# Patient Record
Sex: FEMALE | Race: WHITE | NOT HISPANIC OR LATINO | Employment: OTHER | URBAN - METROPOLITAN AREA
[De-identification: names, ages, dates, MRNs, and addresses within clinical notes are randomized per-mention and may not be internally consistent; named-entity substitution may affect disease eponyms.]

---

## 2017-06-03 ENCOUNTER — RESOLUTE PROFESSIONAL BILLING HOSPITAL PROF FEE (OUTPATIENT)
Dept: HOSPITALIST | Facility: MEDICAL CENTER | Age: 62
End: 2017-06-03
Payer: COMMERCIAL

## 2017-06-03 ENCOUNTER — APPOINTMENT (OUTPATIENT)
Dept: RADIOLOGY | Facility: MEDICAL CENTER | Age: 62
DRG: 439 | End: 2017-06-03
Attending: EMERGENCY MEDICINE
Payer: COMMERCIAL

## 2017-06-03 ENCOUNTER — APPOINTMENT (OUTPATIENT)
Dept: RADIOLOGY | Facility: MEDICAL CENTER | Age: 62
DRG: 439 | End: 2017-06-03
Attending: INTERNAL MEDICINE
Payer: COMMERCIAL

## 2017-06-03 ENCOUNTER — HOSPITAL ENCOUNTER (INPATIENT)
Facility: MEDICAL CENTER | Age: 62
LOS: 6 days | DRG: 439 | End: 2017-06-09
Attending: EMERGENCY MEDICINE | Admitting: INTERNAL MEDICINE
Payer: COMMERCIAL

## 2017-06-03 DIAGNOSIS — R10.12 LEFT UPPER QUADRANT PAIN: ICD-10-CM

## 2017-06-03 DIAGNOSIS — R11.2 NON-INTRACTABLE VOMITING WITH NAUSEA, UNSPECIFIED VOMITING TYPE: ICD-10-CM

## 2017-06-03 DIAGNOSIS — E87.6 HYPOKALEMIA: ICD-10-CM

## 2017-06-03 DIAGNOSIS — E86.0 DEHYDRATION: ICD-10-CM

## 2017-06-03 LAB
ALBUMIN SERPL BCP-MCNC: 2.5 G/DL (ref 3.2–4.9)
ALBUMIN/GLOB SERPL: 1.5 G/DL
ALP SERPL-CCNC: 40 U/L (ref 30–99)
ALT SERPL-CCNC: 9 U/L (ref 2–50)
ANION GAP SERPL CALC-SCNC: 8 MMOL/L (ref 0–11.9)
ANION GAP SERPL CALC-SCNC: 9 MMOL/L (ref 0–11.9)
APPEARANCE UR: CLEAR
AST SERPL-CCNC: 10 U/L (ref 12–45)
BACTERIA #/AREA URNS HPF: ABNORMAL /HPF
BASOPHILS # BLD AUTO: 0.1 % (ref 0–1.8)
BASOPHILS # BLD: 0.01 K/UL (ref 0–0.12)
BILIRUB SERPL-MCNC: 0.3 MG/DL (ref 0.1–1.5)
BILIRUB UR QL STRIP.AUTO: NEGATIVE
BUN SERPL-MCNC: 14 MG/DL (ref 8–22)
BUN SERPL-MCNC: 15 MG/DL (ref 8–22)
CALCIUM SERPL-MCNC: 5.5 MG/DL (ref 8.4–10.2)
CALCIUM SERPL-MCNC: 7.5 MG/DL (ref 8.4–10.2)
CHLORIDE SERPL-SCNC: 110 MMOL/L (ref 96–112)
CHLORIDE SERPL-SCNC: 119 MMOL/L (ref 96–112)
CO2 SERPL-SCNC: 12 MMOL/L (ref 20–33)
CO2 SERPL-SCNC: 20 MMOL/L (ref 20–33)
COLOR UR: YELLOW
CREAT SERPL-MCNC: 0.39 MG/DL (ref 0.5–1.4)
CREAT SERPL-MCNC: 0.71 MG/DL (ref 0.5–1.4)
EOSINOPHIL # BLD AUTO: 0 K/UL (ref 0–0.51)
EOSINOPHIL NFR BLD: 0 % (ref 0–6.9)
EPI CELLS #/AREA URNS HPF: ABNORMAL /HPF
ERYTHROCYTE [DISTWIDTH] IN BLOOD BY AUTOMATED COUNT: 42.5 FL (ref 35.9–50)
GFR SERPL CREATININE-BSD FRML MDRD: >60 ML/MIN/1.73 M 2
GFR SERPL CREATININE-BSD FRML MDRD: >60 ML/MIN/1.73 M 2
GLOBULIN SER CALC-MCNC: 1.7 G/DL (ref 1.9–3.5)
GLUCOSE SERPL-MCNC: 83 MG/DL (ref 65–99)
GLUCOSE SERPL-MCNC: 99 MG/DL (ref 65–99)
GLUCOSE UR STRIP.AUTO-MCNC: NEGATIVE MG/DL
HCT VFR BLD AUTO: 31.7 % (ref 37–47)
HGB BLD-MCNC: 11.1 G/DL (ref 12–16)
IMM GRANULOCYTES # BLD AUTO: 0.04 K/UL (ref 0–0.11)
IMM GRANULOCYTES NFR BLD AUTO: 0.4 % (ref 0–0.9)
KETONES UR STRIP.AUTO-MCNC: ABNORMAL MG/DL
LACTATE BLD-SCNC: 1.01 MMOL/L (ref 0.5–2)
LACTATE BLD-SCNC: 1.25 MMOL/L (ref 0.5–2)
LEUKOCYTE ESTERASE UR QL STRIP.AUTO: ABNORMAL
LIPASE SERPL-CCNC: 13 U/L (ref 7–58)
LYMPHOCYTES # BLD AUTO: 0.86 K/UL (ref 1–4.8)
LYMPHOCYTES NFR BLD: 9.3 % (ref 22–41)
MCH RBC QN AUTO: 29.9 PG (ref 27–33)
MCHC RBC AUTO-ENTMCNC: 35 G/DL (ref 33.6–35)
MCV RBC AUTO: 85.4 FL (ref 81.4–97.8)
MICRO URNS: ABNORMAL
MONOCYTES # BLD AUTO: 0.72 K/UL (ref 0–0.85)
MONOCYTES NFR BLD AUTO: 7.8 % (ref 0–13.4)
MUCOUS THREADS #/AREA URNS HPF: ABNORMAL /HPF
NEUTROPHILS # BLD AUTO: 7.65 K/UL (ref 2–7.15)
NEUTROPHILS NFR BLD: 82.4 % (ref 44–72)
NITRITE UR QL STRIP.AUTO: NEGATIVE
NRBC # BLD AUTO: 0 K/UL
NRBC BLD AUTO-RTO: 0 /100 WBC
PH UR STRIP.AUTO: 6 [PH]
PLATELET # BLD AUTO: 248 K/UL (ref 164–446)
PMV BLD AUTO: 8.9 FL (ref 9–12.9)
POTASSIUM SERPL-SCNC: 2 MMOL/L (ref 3.6–5.5)
POTASSIUM SERPL-SCNC: 4.6 MMOL/L (ref 3.6–5.5)
PROT SERPL-MCNC: 4.2 G/DL (ref 6–8.2)
PROT UR QL STRIP: 100 MG/DL
RBC # BLD AUTO: 3.71 M/UL (ref 4.2–5.4)
RBC # URNS HPF: ABNORMAL /HPF
RBC UR QL AUTO: ABNORMAL
SODIUM SERPL-SCNC: 138 MMOL/L (ref 135–145)
SODIUM SERPL-SCNC: 140 MMOL/L (ref 135–145)
SP GR UR STRIP.AUTO: 1.02
SPECIMEN DRAWN FROM PATIENT: NORMAL
SPECIMEN DRAWN FROM PATIENT: NORMAL
TSH SERPL DL<=0.005 MIU/L-ACNC: 0.3 UIU/ML (ref 0.35–5.5)
UNIDENT CRYS URNS QL MICRO: ABNORMAL /HPF
WBC # BLD AUTO: 9.3 K/UL (ref 4.8–10.8)
WBC #/AREA URNS HPF: ABNORMAL /HPF

## 2017-06-03 PROCEDURE — 700111 HCHG RX REV CODE 636 W/ 250 OVERRIDE (IP): Performed by: EMERGENCY MEDICINE

## 2017-06-03 PROCEDURE — 96361 HYDRATE IV INFUSION ADD-ON: CPT

## 2017-06-03 PROCEDURE — 700105 HCHG RX REV CODE 258: Performed by: EMERGENCY MEDICINE

## 2017-06-03 PROCEDURE — 84443 ASSAY THYROID STIM HORMONE: CPT

## 2017-06-03 PROCEDURE — 80048 BASIC METABOLIC PNL TOTAL CA: CPT

## 2017-06-03 PROCEDURE — 770020 HCHG ROOM/CARE - TELE (206)

## 2017-06-03 PROCEDURE — 83690 ASSAY OF LIPASE: CPT

## 2017-06-03 PROCEDURE — 99285 EMERGENCY DEPT VISIT HI MDM: CPT

## 2017-06-03 PROCEDURE — 700111 HCHG RX REV CODE 636 W/ 250 OVERRIDE (IP)

## 2017-06-03 PROCEDURE — 85025 COMPLETE CBC W/AUTO DIFF WBC: CPT

## 2017-06-03 PROCEDURE — 71010 DX-CHEST-PORTABLE (1 VIEW): CPT

## 2017-06-03 PROCEDURE — 700111 HCHG RX REV CODE 636 W/ 250 OVERRIDE (IP): Performed by: INTERNAL MEDICINE

## 2017-06-03 PROCEDURE — 96365 THER/PROPH/DIAG IV INF INIT: CPT

## 2017-06-03 PROCEDURE — 99223 1ST HOSP IP/OBS HIGH 75: CPT | Performed by: INTERNAL MEDICINE

## 2017-06-03 PROCEDURE — 36415 COLL VENOUS BLD VENIPUNCTURE: CPT

## 2017-06-03 PROCEDURE — 96375 TX/PRO/DX INJ NEW DRUG ADDON: CPT

## 2017-06-03 PROCEDURE — 87086 URINE CULTURE/COLONY COUNT: CPT

## 2017-06-03 PROCEDURE — 94760 N-INVAS EAR/PLS OXIMETRY 1: CPT

## 2017-06-03 PROCEDURE — 81001 URINALYSIS AUTO W/SCOPE: CPT

## 2017-06-03 PROCEDURE — 96376 TX/PRO/DX INJ SAME DRUG ADON: CPT

## 2017-06-03 PROCEDURE — 83605 ASSAY OF LACTIC ACID: CPT | Mod: 91

## 2017-06-03 PROCEDURE — 80053 COMPREHEN METABOLIC PANEL: CPT

## 2017-06-03 PROCEDURE — 87040 BLOOD CULTURE FOR BACTERIA: CPT | Mod: 91

## 2017-06-03 RX ORDER — PROMETHAZINE HYDROCHLORIDE 25 MG/1
12.5-25 TABLET ORAL EVERY 4 HOURS PRN
Status: DISCONTINUED | OUTPATIENT
Start: 2017-06-03 | End: 2017-06-09 | Stop reason: HOSPADM

## 2017-06-03 RX ORDER — SODIUM CHLORIDE 9 MG/ML
1000 INJECTION, SOLUTION INTRAVENOUS ONCE
Status: COMPLETED | OUTPATIENT
Start: 2017-06-03 | End: 2017-06-03

## 2017-06-03 RX ORDER — ASPIRIN 81 MG/1
81 TABLET, CHEWABLE ORAL DAILY
Status: DISCONTINUED | OUTPATIENT
Start: 2017-06-03 | End: 2017-06-09 | Stop reason: HOSPADM

## 2017-06-03 RX ORDER — OXYCODONE HYDROCHLORIDE 15 MG/1
15 TABLET ORAL EVERY 4 HOURS PRN
Status: ON HOLD | COMMUNITY
End: 2017-06-09

## 2017-06-03 RX ORDER — ENALAPRILAT 1.25 MG/ML
1.25 INJECTION INTRAVENOUS EVERY 6 HOURS PRN
Status: DISCONTINUED | OUTPATIENT
Start: 2017-06-03 | End: 2017-06-09 | Stop reason: HOSPADM

## 2017-06-03 RX ORDER — AMOXICILLIN 250 MG
2 CAPSULE ORAL 2 TIMES DAILY
Status: DISCONTINUED | OUTPATIENT
Start: 2017-06-03 | End: 2017-06-09 | Stop reason: HOSPADM

## 2017-06-03 RX ORDER — OXYCODONE HYDROCHLORIDE 5 MG/1
15 TABLET ORAL EVERY 4 HOURS PRN
Status: DISCONTINUED | OUTPATIENT
Start: 2017-06-03 | End: 2017-06-09 | Stop reason: HOSPADM

## 2017-06-03 RX ORDER — POTASSIUM CHLORIDE 7.45 MG/ML
INJECTION INTRAVENOUS
Status: DISPENSED
Start: 2017-06-03 | End: 2017-06-04

## 2017-06-03 RX ORDER — MORPHINE SULFATE 4 MG/ML
4 INJECTION, SOLUTION INTRAMUSCULAR; INTRAVENOUS ONCE
Status: DISCONTINUED | OUTPATIENT
Start: 2017-06-03 | End: 2017-06-03

## 2017-06-03 RX ORDER — ONDANSETRON 2 MG/ML
4 INJECTION INTRAMUSCULAR; INTRAVENOUS ONCE
Status: COMPLETED | OUTPATIENT
Start: 2017-06-03 | End: 2017-06-03

## 2017-06-03 RX ORDER — POLYETHYLENE GLYCOL 3350 17 G/17G
1 POWDER, FOR SOLUTION ORAL
Status: DISCONTINUED | OUTPATIENT
Start: 2017-06-03 | End: 2017-06-09 | Stop reason: HOSPADM

## 2017-06-03 RX ORDER — POTASSIUM CHLORIDE 7.45 MG/ML
10 INJECTION INTRAVENOUS
Status: COMPLETED | OUTPATIENT
Start: 2017-06-03 | End: 2017-06-03

## 2017-06-03 RX ORDER — BISACODYL 10 MG
10 SUPPOSITORY, RECTAL RECTAL
Status: DISCONTINUED | OUTPATIENT
Start: 2017-06-03 | End: 2017-06-09 | Stop reason: HOSPADM

## 2017-06-03 RX ORDER — POTASSIUM CHLORIDE 20 MEQ/1
40 TABLET, EXTENDED RELEASE ORAL 3 TIMES DAILY
Status: DISPENSED | OUTPATIENT
Start: 2017-06-03 | End: 2017-06-05

## 2017-06-03 RX ORDER — ONDANSETRON 4 MG/1
4 TABLET, ORALLY DISINTEGRATING ORAL EVERY 4 HOURS PRN
Status: DISCONTINUED | OUTPATIENT
Start: 2017-06-03 | End: 2017-06-09 | Stop reason: HOSPADM

## 2017-06-03 RX ORDER — METOCLOPRAMIDE HYDROCHLORIDE 5 MG/ML
10 INJECTION INTRAMUSCULAR; INTRAVENOUS EVERY 6 HOURS
Status: DISCONTINUED | OUTPATIENT
Start: 2017-06-03 | End: 2017-06-06

## 2017-06-03 RX ORDER — SODIUM CHLORIDE 9 MG/ML
INJECTION, SOLUTION INTRAVENOUS CONTINUOUS
Status: DISCONTINUED | OUTPATIENT
Start: 2017-06-03 | End: 2017-06-04

## 2017-06-03 RX ORDER — MORPHINE SULFATE 4 MG/ML
1-4 INJECTION, SOLUTION INTRAMUSCULAR; INTRAVENOUS EVERY 4 HOURS PRN
Status: DISCONTINUED | OUTPATIENT
Start: 2017-06-03 | End: 2017-06-05

## 2017-06-03 RX ORDER — ONDANSETRON 2 MG/ML
4 INJECTION INTRAMUSCULAR; INTRAVENOUS EVERY 4 HOURS PRN
Status: DISCONTINUED | OUTPATIENT
Start: 2017-06-03 | End: 2017-06-09 | Stop reason: HOSPADM

## 2017-06-03 RX ORDER — PROMETHAZINE HYDROCHLORIDE 25 MG/1
12.5-25 SUPPOSITORY RECTAL EVERY 4 HOURS PRN
Status: DISCONTINUED | OUTPATIENT
Start: 2017-06-03 | End: 2017-06-09 | Stop reason: HOSPADM

## 2017-06-03 RX ADMIN — MORPHINE SULFATE 2 MG: 4 INJECTION INTRAVENOUS at 20:01

## 2017-06-03 RX ADMIN — ENOXAPARIN SODIUM 40 MG: 100 INJECTION SUBCUTANEOUS at 17:58

## 2017-06-03 RX ADMIN — POTASSIUM CHLORIDE 10 MEQ: 10 INJECTION, SOLUTION INTRAVENOUS at 15:18

## 2017-06-03 RX ADMIN — SODIUM CHLORIDE: 9 INJECTION, SOLUTION INTRAVENOUS at 18:42

## 2017-06-03 RX ADMIN — SODIUM CHLORIDE 1000 ML: 9 INJECTION, SOLUTION INTRAVENOUS at 13:12

## 2017-06-03 RX ADMIN — SODIUM CHLORIDE: 9 INJECTION, SOLUTION INTRAVENOUS at 15:22

## 2017-06-03 RX ADMIN — METOCLOPRAMIDE 10 MG: 5 INJECTION, SOLUTION INTRAMUSCULAR; INTRAVENOUS at 17:57

## 2017-06-03 RX ADMIN — ONDANSETRON 4 MG: 2 INJECTION INTRAMUSCULAR; INTRAVENOUS at 13:11

## 2017-06-03 RX ADMIN — MORPHINE SULFATE 2 MG: 4 INJECTION INTRAVENOUS at 18:41

## 2017-06-03 RX ADMIN — SODIUM CHLORIDE: 9 INJECTION, SOLUTION INTRAVENOUS at 22:44

## 2017-06-03 RX ADMIN — HYDROMORPHONE HYDROCHLORIDE 1 MG: 1 INJECTION, SOLUTION INTRAMUSCULAR; INTRAVENOUS; SUBCUTANEOUS at 13:11

## 2017-06-03 RX ADMIN — POTASSIUM CHLORIDE 10 MEQ: 10 INJECTION, SOLUTION INTRAVENOUS at 17:40

## 2017-06-03 RX ADMIN — HYDROMORPHONE HYDROCHLORIDE 0.5 MG: 1 INJECTION, SOLUTION INTRAMUSCULAR; INTRAVENOUS; SUBCUTANEOUS at 15:37

## 2017-06-03 RX ADMIN — ONDANSETRON 4 MG: 2 INJECTION INTRAMUSCULAR; INTRAVENOUS at 15:38

## 2017-06-03 RX ADMIN — POTASSIUM CHLORIDE 10 MEQ: 10 INJECTION, SOLUTION INTRAVENOUS at 16:23

## 2017-06-03 ASSESSMENT — PAIN SCALES - GENERAL
PAINLEVEL_OUTOF10: 5
PAINLEVEL_OUTOF10: 2
PAINLEVEL_OUTOF10: 4
PAINLEVEL_OUTOF10: 7

## 2017-06-03 ASSESSMENT — LIFESTYLE VARIABLES
DO YOU DRINK ALCOHOL: NO
EVER_SMOKED: YES

## 2017-06-03 ASSESSMENT — PATIENT HEALTH QUESTIONNAIRE - PHQ9
1. LITTLE INTEREST OR PLEASURE IN DOING THINGS: NOT AT ALL
SUM OF ALL RESPONSES TO PHQ QUESTIONS 1-9: 0
SUM OF ALL RESPONSES TO PHQ9 QUESTIONS 1 AND 2: 0
2. FEELING DOWN, DEPRESSED, IRRITABLE, OR HOPELESS: NOT AT ALL

## 2017-06-03 NOTE — ED NOTES
Sabina from Lab called with critical result of Potassium of 2.0 and Calcium of 5.5 at 1454. Critical lab result read back to Sabina.   Dr. Molina notified of critical lab result at 1455.  Critical lab result read back by Dr. Molina.

## 2017-06-03 NOTE — ED NOTES
Pt amb to triage c/o clear productive cough with back pain for 2 weeks. Pt is a 1/2 pack a day smoker. Pt also c/o LUQ pain a week with vomiting. Hx of pancreatitis.

## 2017-06-03 NOTE — IP AVS SNAPSHOT
6/9/2017    Antonieta Addison  [address]  [place] [address]    Dear Antonieta:    Counts include 234 beds at the [facility] wants to ensure your discharge home is safe and you or your loved ones have had all of your questions answered regarding your care after you leave the hospital.    Below is a list of resources and contact information should you have any questions regarding your hospital stay, follow-up instructions, or active medical symptoms.    Questions or Concerns Regarding… Contact   Medical Questions Related to Your Discharge  (7 days a week, 8am-5pm) Contact a Nurse Care Coordinator   [phone]   Medical Questions Not Related to Your Discharge  (24 hours a day / 7 days a week)  Contact the Nurse Health Line   [phone]    Medications or Discharge Instructions Refer to your discharge packet   or contact your [facility] Primary Care Provider   [phone]   Follow-up Appointment(s) Schedule your appointment via [employer]   or contact Scheduling [phone]   Billing Review your statement via [employer]  or contact Billing [phone]   Medical Records Review your records via [employer]   or contact Medical Records [phone]     You may receive a telephone call within two days of discharge. This call is to make certain you understand your discharge instructions and have the opportunity to have any questions answered. You can also easily access your medical information, test results and upcoming appointments via the [employer] free online health management tool. You can learn more and sign up at [employer]. For assistance setting up your [employer] account, please call [phone].    Once again, we want to ensure your discharge home is safe and that you have a clear understanding of any next steps in your care. If you have any questions or concerns, please do not hesitate to contact us, we are here for you. Thank you for choosing [facility] for your healthcare needs.    Sincerely,    Your [facility]  Team

## 2017-06-03 NOTE — IP AVS SNAPSHOT
" Home Care Instructions                                                                                                                  Name:Antonieta Addison  Medical Record Number:[id number]  CSN: [id number]    YOB: 1955   Age: 62 y.o.  Sex: female  HT:1.499 m (4' 11\") WT: 41.7 kg (91 lb 14.9 oz)          Admit Date: 6/3/2017     Discharge Date:   Today's Date: 6/9/2017  Attending Doctor:  Mikhail Lowry M.D.                  Allergies:  Iodine and Tape            Discharge Instructions       Discharge Instructions    Discharged to home by car with relative. Discharged via wheelchair, hospital escort: Yes.  Special equipment needed: Not Applicable    Be sure to schedule a follow-up appointment with your primary care doctor or any specialists as instructed.     Discharge Plan:   Influenza Vaccine Indication: Patient Refuses    I understand that a diet low in cholesterol, fat, and sodium is recommended for good health. Unless I have been given specific instructions below for another diet, I accept this instruction as my diet prescription.   Other diet: full liquids until follow up with GI    Special Instructions: None    · Is patient discharged on Warfarin / Coumadin?   No     · Is patient Post Blood Transfusion?  No  Nausea and Vomiting  Nausea is a sick feeling that often comes before throwing up (vomiting). Vomiting is a reflex where stomach contents come out of your mouth. Vomiting can cause severe loss of body fluids (dehydration). Children and elderly adults can become dehydrated quickly, especially if they also have diarrhea. Nausea and vomiting are symptoms of a condition or disease. It is important to find the cause of your symptoms.  CAUSES   · Direct irritation of the stomach lining. This irritation can result from increased acid production (gastroesophageal reflux disease), infection, food poisoning, taking certain medicines (such as nonsteroidal anti-inflammatory drugs), alcohol use, " or tobacco use.  · Signals from the brain. These signals could be caused by a headache, heat exposure, an inner ear disturbance, increased pressure in the brain from injury, infection, a tumor, or a concussion, pain, emotional stimulus, or metabolic problems.  · An obstruction in the gastrointestinal tract (bowel obstruction).  · Illnesses such as diabetes, hepatitis, gallbladder problems, appendicitis, kidney problems, cancer, sepsis, atypical symptoms of a heart attack, or eating disorders.  · Medical treatments such as chemotherapy and radiation.  · Receiving medicine that makes you sleep (general anesthetic) during surgery.  DIAGNOSIS  Your caregiver may ask for tests to be done if the problems do not improve after a few days. Tests may also be done if symptoms are severe or if the reason for the nausea and vomiting is not clear. Tests may include:  · Urine tests.  · Blood tests.  · Stool tests.  · Cultures (to look for evidence of infection).  · X-rays or other imaging studies.  Test results can help your caregiver make decisions about treatment or the need for additional tests.  TREATMENT  You need to stay well hydrated. Drink frequently but in small amounts. You may wish to drink water, sports drinks, clear broth, or eat frozen ice pops or gelatin dessert to help stay hydrated. When you eat, eating slowly may help prevent nausea. There are also some antinausea medicines that may help prevent nausea.  HOME CARE INSTRUCTIONS   · Take all medicine as directed by your caregiver.  · If you do not have an appetite, do not force yourself to eat. However, you must continue to drink fluids.  · If you have an appetite, eat a normal diet unless your caregiver tells you differently.  1. Eat a variety of complex carbohydrates (rice, wheat, potatoes, bread), lean meats, yogurt, fruits, and vegetables.  2. Avoid high-fat foods because they are more difficult to digest.  · Drink enough water and fluids to keep your urine  clear or pale yellow.  · If you are dehydrated, ask your caregiver for specific rehydration instructions. Signs of dehydration may include:  1. Severe thirst.  2. Dry lips and mouth.  3. Dizziness.  4. Dark urine.  5. Decreasing urine frequency and amount.  6. Confusion.  7. Rapid breathing or pulse.  SEEK IMMEDIATE MEDICAL CARE IF:   · You have blood or brown flecks (like coffee grounds) in your vomit.  · You have black or bloody stools.  · You have a severe headache or stiff neck.  · You are confused.  · You have severe abdominal pain.  · You have chest pain or trouble breathing.  · You do not urinate at least once every 8 hours.  · You develop cold or clammy skin.  · You continue to vomit for longer than 24 to 48 hours.  · You have a fever.  MAKE SURE YOU:   · Understand these instructions.  · Will watch your condition.  · Will get help right away if you are not doing well or get worse.     This information is not intended to replace advice given to you by your health care provider. Make sure you discuss any questions you have with your health care provider.     Document Released: 12/18/2006 Document Revised: 03/11/2013 Document Reviewed: 05/16/2012  [employer] Interactive Patient Education ©2016 [employer]  Hypokalemia  Hypokalemia means a low potassium level in the blood. Symptoms may include muscle weakness and cramping, fatigue, abdominal pain, vomiting, constipation, or irregularities of the heartbeat. Sometimes hypokalemia is discovered by your caregiver if you are taking certain medicines for high blood pressure or kidney disease.   Potassium is an electrolyte that helps regulate the amount of fluid in the body. It also stimulates muscle contraction and maintains a stable acid-base balance. If potassium levels go too low or too high, your health may be in danger. You are at risk for developing shock, heart, and lung problems. Hypokalemia can occur if you have excessive diarrhea, vomiting, or sweating.  Potassium can be lost through your kidneys in the urine. Certain common medicines can also cause potassium loss, especially water pills (diuretics). The same is possible with cortisone medications or certain types of antibiotics. Low potassium can be dangerous if you are taking certain heart medicines. In diabetes, your potassium may fall after you take insulin, especially if your diabetes had been out of control for a while. In rare cases, potassium may be low because you are not getting enough in your diet.   In adults, a potassium level below 3.5 mEq/L is usually considered low.  Hypokalemia can be treated with potassium supplements taken by mouth and a diet that is high in potassium. Foods with high potassium content are:  · Peas, lentils, lima beans, nuts, and dried fruit.   · Whole grain and bran cereals and breads.   · Fresh fruit and vegetables. Examples include:   · Bananas.   · Cantaloupe.   · Grapefruit.   · Oranges.   · Tomatoes.   · Honeydew melons.   · Potatoes.   · Peaches.   · Orange and tomato juices.   · Meats.   See your caregiver as instructed for a follow-up blood test to be sure your potassium is back to normal.  SEEK MEDICAL CARE IF:   · You have nausea, vomiting, constipation, or abdominal pain.   · You have palpitations or irregular heartbeats, chest pain or shortness of breath.   · You have muscle cramps or weakness or fatigue.   · You have lethargy.   SEEK IMMEDIATE MEDICAL CARE IF:   · You have paralysis.   · You have confusion or other mental status changes.   Document Released: 12/18/2006 Document Revised: 03/11/2013 Document Reviewed: 04/13/2011  [employer] Patient Information ©2013 [employer].  Acute Pancreatitis  Acute pancreatitis is a disease in which the pancreas becomes suddenly irritated (inflamed). The pancreas is a large gland behind your stomach. The pancreas makes enzymes that help digest food. The pancreas also makes 2 hormones that help control your blood sugar. Acute  pancreatitis happens when the enzymes attack and damage the pancreas. Most attacks last a couple of days and can cause serious problems.  HOME CARE  · Follow your doctor's diet instructions. You may need to avoid alcohol and limit fat in your diet.  · Eat small meals often.  · Drink enough fluids to keep your pee (urine) clear or pale yellow.  · Only take medicines as told by your doctor.  · Avoid drinking alcohol if it caused your disease.  · Do not smoke.  · Get plenty of rest.  · Check your blood sugar at home as told by your doctor.  · Keep all doctor visits as told.  GET HELP IF:  · You do not get better as quickly as expected.  · You have new or worsening symptoms.  · You have lasting pain, weakness, or feel sick to your stomach (nauseous).  · You get better and then have another pain attack.  GET HELP RIGHT AWAY IF:   · You are unable to eat or keep fluids down.  · Your pain becomes severe.  · You have a fever or lasting symptoms for more than 2 to 3 days.  · You have a fever and your symptoms suddenly get worse.  · Your skin or the white part of your eyes turn yellow (jaundice).  · You throw up (vomit).  · You feel dizzy, or you pass out (faint).  · Your blood sugar is high (over 300 mg/dL).  MAKE SURE YOU:   · Understand these instructions.  · Will watch your condition.  · Will get help right away if you are not doing well or get worse.     This information is not intended to replace advice given to you by your health care provider. Make sure you discuss any questions you have with your health care provider.     Document Released: 06/05/2009 Document Revised: 01/08/2016 Document Reviewed: 03/28/2013  [employer] Interactive Patient Education ©2016 [employer]      Depression / Suicide Risk    As you are discharged from this [facility] facility, it is important to learn how to keep safe from harming yourself.    Recognize the warning signs:  · Abrupt changes in personality, positive or negative- including  increase in energy   · Giving away possessions  · Change in eating patterns- significant weight changes-  positive or negative  · Change in sleeping patterns- unable to sleep or sleeping all the time   · Unwillingness or inability to communicate  · Depression  · Unusual sadness, discouragement and loneliness  · Talk of wanting to die  · Neglect of personal appearance   · Rebelliousness- reckless behavior  · Withdrawal from people/activities they love  · Confusion- inability to concentrate     If you or a loved one observes any of these behaviors or has concerns about self-harm, here's what you can do:  · Talk about it- your feelings and reasons for harming yourself  · Remove any means that you might use to hurt yourself (examples: pills, rope, extension cords, firearm)  · Get professional help from the community (Mental Health, Substance Abuse, psychological counseling)  · Do not be alone:Call your Safe Contact- someone whom you trust who will be there for you.  · Call your local CRISIS HOTLINE 297-7701 or 273-422-6398  · Call your local Children's Mobile Crisis Response Team [place] (713) 255-6070 or wwwMagix  · Call the toll free National Suicide Prevention Hotlines   · National Suicide Prevention Lifeline 512-523-GKEU (4782)  · National Hope Line Network 800-SUICIDE (207-9962)        Follow-up Information     1. Follow up with Nathan Gloria M.D..    Specialty:  Gastroenterology    Why:  RN called and left message with office nurse to make appt & call back ASAP    Contact information    [address] [place] [address]  [phone]           Discharge Medication Instructions:    Below are the medications your physician expects you to take upon discharge:    Review all your home medications and newly ordered medications with your doctor and/or pharmacist. Follow medication instructions as directed by your doctor and/or pharmacist.    Please keep your medication list with you and share with  your physician.               Medication List      START taking these medications        Instructions    Morning Afternoon Evening Bedtime    omeprazole 20 MG delayed-release capsule   Last time this was given:  20 mg on 6/9/2017  7:42 AM   Commonly known as:  PRILOSEC        Take 1 Cap by mouth every day.   Dose:  20 mg                        ondansetron 4 MG Tbdp   Last time this was given:  4 mg on 6/9/2017 12:46 PM   Commonly known as:  ZOFRAN ODT        Take 1 Tab by mouth every four hours as needed for Nausea/Vomiting (give PO if no IV route available).   Dose:  4 mg                        pancrelipase (Lip-Prot-Amyl) 6000 UNITS Cpep   Last time this was given:  12,000 Units on 6/9/2017 11:50 AM   Commonly known as:  CREON 6000        Take 2 Caps by mouth 3 times a day, with meals.   Dose:  2 Cap                          CHANGE how you take these medications        Instructions    Morning Afternoon Evening Bedtime    * oxycodone 15 MG immediate release tablet   What changed:  when to take this   Last time this was given:  15 mg on 6/9/2017 10:36 AM   Commonly known as:  OXY-IR        Take 1 Tab by mouth every 6 hours as needed for Severe Pain.   Dose:  15 mg                        * oxyCODONE CR 10 MG T12a   What changed:  You were already taking a medication with the same name, and this prescription was added. Make sure you understand how and when to take each.   Last time this was given:  10 mg on 6/9/2017  7:42 AM   Commonly known as:  OXYCONTIN        Take 1 Tab by mouth every 12 hours.   Dose:  10 mg                        * Notice:  This list has 2 medication(s) that are the same as other medications prescribed for you. Read the directions carefully, and ask your doctor or other care provider to review them with you.      CONTINUE taking these medications        Instructions    Morning Afternoon Evening Bedtime    aspirin 81 MG tablet        Take 81 mg by mouth every day.   Dose:  81 mg                          NON SPECIFIED        Take 2 Tabs by mouth every 6 hours as needed. OTC - Cold Medication   Dose:  2 Tab                             Where to Get Your Medications      These medications were sent to [id number] - [place] - [address]  [address] [place] [address]     Phone:  [phone]    - omeprazole 20 MG delayed-release capsule  - ondansetron 4 MG Tbdp  - pancrelipase (Lip-Prot-Amyl) 6000 UNITS Cpep      You can get these medications from any pharmacy     Bring a paper prescription for each of these medications    - oxycodone 15 MG immediate release tablet  - oxyCODONE CR 10 MG T12a            Instructions           Diet / Nutrition:    Follow any diet instructions given to you by your doctor or the dietician, including how much salt (sodium) you are allowed each day.    If you are overweight, talk to your doctor about a weight reduction plan.    Activity:    Remain physically active following your doctor's instructions about exercise and activity.    Rest often.     Any time you become even a little tired or short of breath, SIT DOWN and rest.    Worsening Symptoms:    Report any of the following signs and symptoms to the doctor's office immediately:    *Pain of jaw, arm, or neck  *Chest pain not relieved by medication                               *Dizziness or loss of consciousness  *Difficulty breathing even when at rest   *More tired than usual                                       *Bleeding drainage or swelling of surgical site  *Swelling of feet, ankles, legs or stomach                 *Fever (>100ºF)  *Pink or blood tinged sputum  *Weight gain (3lbs/day or 5lbs /week)           *Shock from internal defibrillator (if applicable)  *Palpitations or irregular heartbeats                *Cool and/or numb extremities    Stroke Awareness    Common Risk Factors for Stroke include:    Age  Atrial Fibrillation  Carotid Artery Stenosis  Diabetes Mellitus  Excessive alcohol  consumption  High blood pressure  Overweight   Physical inactivity  Smoking    Warning signs and symptoms of a stroke include:    *Sudden numbness or weakness of the face, arm or leg (especially on one side of the body).  *Sudden confusion, trouble speaking or understanding.  *Sudden trouble seeing in one or both eyes.  *Sudden trouble walking, dizziness, loss of balance or coordination.Sudden severe headache with no known cause.    It is very important to get treatment quickly when a stroke occurs. If you experience any of the above warning signs, call 911 immediately.                   Disclaimer         Quit Smoking / Tobacco Use:    I understand the use of any tobacco products increases my chance of suffering from future heart disease or stroke and could cause other illnesses which may shorten my life. Quitting the use of tobacco products is the single most important thing I can do to improve my health. For further information on smoking / tobacco cessation call a Toll Free Quit Line at 1-778.552.4297 (*National Cancer [place]) or 1-882.299.4392 (American Lung Association) or you can access the web based program at www.lung"[employer].org.    [place] Tobacco Users Help Line:  (167) 627-5996       Toll Free: 1-402.474.9611  Quit Tobacco Program [facility] (245)928-9776    Crisis Hotline:    [place] Crisis Hotline:  4-559-WLKGZMR or 1-723.588.2419    [place] Crisis Hotline:    1-614.171.6062 or 010-679-4115    Discharge Survey:   Thank you for choosing [facility]. We hope we did everything we could to make your hospital stay a pleasant one. You may be receiving a phone survey and we would appreciate your time and participation in answering the questions. Your input is very valuable to us in our efforts to improve our service to our patients and their families.        My signature on this form indicates that:    1. I have reviewed and understand the above information.  2. My questions regarding  this information have been answered to my satisfaction.  3. I have formulated a plan with my discharge nurse to obtain my prescribed medications for home.                  Disclaimer         __________________________________                     __________       ________                       Patient Signature                                                 Date                    Time

## 2017-06-03 NOTE — ED PROVIDER NOTES
"ED Provider Note    CHIEF COMPLAINT  Chief Complaint   Patient presents with   • LUQ Pain   • N/V   • Cough       HPI  Antonieta Dominique is a 62 y.o. female who presents for left upper quadrant abdominal pain for 7 days which is constant and quite severe. She's had fever to 101 and a 10 pound weight loss. She has nausea and vomiting whenever she eats or drinks. History of pancreatitis and this is similar. The pain does not radiate to the back. History of pancreatic head cyst and denies history of cholecystitis or alcohol use.    REVIEW OF SYSTEMS  Pertinent positives include: Abdominal pain, vomiting, fever. Cough.  Pertinent negatives include: Diarrhea, constipation, peptic ulcer disease, gastritis, dyspepsia, diverticulitis, cholecystitis, alcohol use, diabetes, immunocompromise, dysuria, urgency, frequency, hematuria.  10+ systems reviewed and negative.      PAST MEDICAL HISTORY  Past Medical History   Diagnosis Date   • Panic attack    • Personal history of venous thrombosis and embolism      1970   • Other specified symptom associated with female genital organs      hysterectomy   • Cancer (CMS-HCC)      colon CA 1977   • Psychiatric problem      panic attacks   • Heart murmur      as child   • COPD      asthma   • Arthritis      rheumatoid; hands & R shoulder   • Fall      occasionally due to macular degeneration   • Macular degeneration    • Hiatus hernia syndrome    • Bowel habit changes      constipation   • Dental disorder      upper partial denture   • Pneumonia 1995   • Back pain    • PAIN DISORDER    • Pain      mid back, low back   • Hepatitis A 1980's   • Hypertension      weight loss, taken off medication approx 2014   • Heart burn    • Angina      occasional, had cardiac workup \"ait was fine\"   • Bronchitis 1995   • ASTHMA      \"not an issue since pneumonia in 1995\"   • Snoring      sleep study done approx 1998, no treatment recommended   • Cataract      surgical repair bilateral       SOCIAL " "HISTORY  Social History   Substance Use Topics   • Smoking status: Current Every Day Smoker -- 0.50 packs/day for 32 years     Types: Cigarettes   • Smokeless tobacco: Never Used      Comment: 1 ppd times 30yrs   • Alcohol Use: No     History   Drug Use No       SURGICAL HISTORY  Past Surgical History   Procedure Laterality Date   • Hysterectomy, total abdominal  1975   • Tonsillectomy  1957   • Primary c section  1972, 1973   • Colon resection  1983   • Cataract extraction with iol  2013   • Eye surgery Bilateral 1950's     muscle repair   • Laparotomy  1977     bilateral salpingo-oophorectomy   • Laparoscopy  1980's   • Gastroscopy-endo N/A 4/8/2016     Procedure: GASTROSCOPY-ENDO W/POSS BIOPSY, DILATION, PANCREAS POLYPECTOMY AND CONTROL OF HEMORRHAGE;  Surgeon: Nathan Gloria M.D.;  Location: [facility];  Service:    • Egd w/endoscopic ultrasound N/A 4/8/2016     Procedure: EGD W/ENDOSCOPIC ULTRASOUND;  Surgeon: Nathan Gloria M.D.;  Location: [facility];  Service:    • Egd with asp/bx N/A 4/8/2016     Procedure: EGD WITH ASP/BX - FNA;  Surgeon: Nathan Gloria M.D.;  Location: SURGERY [facility];  Service:        CURRENT MEDICATIONS  See Epic      ALLERGIES  Allergies   Allergen Reactions   • Iodine Hives     IVP dye   • Tape Itching     Peels skin off  Paper tape ok, if it is not on to long.       PHYSICAL EXAM  VITAL SIGNS: /82 mmHg  Pulse 120  Temp(Src) 37.8 °C (100 °F)  Resp 22  Ht 1.499 m (4' 11\")  Wt 38 kg (83 lb 12.4 oz)  BMI 16.91 kg/m2  SpO2 97%  Reviewed and tachycardic, borderline febrile, tachypneic, no hypoxia room air  Constitutional: Well developed, Well nourished, thin, appears mildly ill.  HENT: Normocephalic, atraumatic, bilateral external ears normal, oropharynx moist, No exudates or erythema.   Eyes: PERRLA, conjunctiva pink, no scleral icterus.   Cardiovascular: Regular tachycardic without murmur, rub, gallop.  Respiratory: No rales, rhonchi, " wheeze.  Gastrointestinal: Soft, mild left upper quadrant tenderness without rebound guarding or distention. No masses.  Skin: No erythema, no rash.   Genitourinary:  No costovertebral angle tenderness.   Neurologic: Alert & oriented x 3, cranial nerves 2-12 intact by passive exam.  No focal deficit noted.  Psychiatric: Affect normal, Judgment normal, Mood normal.     DIFFERENTIAL DIAGNOSIS:  Pancreatitis, pyelonephritis, gastritis, peptic ulcer disease, colitis, splenic infarct, pneumonia.    RADIOLOGY/PROCEDURES  DX-CHEST-PORTABLE (1 VIEW)   Final Result      No acute cardiac or pulmonary abnormalities are identified.      Prior CT abdomen and pelvis results reviewed from December demonstrated 14 mm pancreatic head cyst      LABORATORY:  Results for orders placed or performed during the hospital encounter of 06/03/17   Lactic acid (lactate)   Result Value Ref Range    Lactic Acid 1.01 0.50 - 2.00 mmol/L    Specimen Venous    Lactic acid (lactate)   Result Value Ref Range    Lactic Acid 1.25 0.50 - 2.00 mmol/L    Specimen Venous    CBC WITH DIFFERENTIAL   Result Value Ref Range    WBC 9.3 4.8 - 10.8 K/uL    RBC 3.71 (L) 4.20 - 5.40 M/uL    Hemoglobin 11.1 (L) 12.0 - 16.0 g/dL    Hematocrit 31.7 (L) 37.0 - 47.0 %    MCV 85.4 81.4 - 97.8 fL    MCH 29.9 27.0 - 33.0 pg    MCHC 35.0 33.6 - 35.0 g/dL    RDW 42.5 35.9 - 50.0 fL    Platelet Count 248 164 - 446 K/uL    MPV 8.9 (L) 9.0 - 12.9 fL    Neutrophils-Polys 82.40 (H) 44.00 - 72.00 %    Lymphocytes 9.30 (L) 22.00 - 41.00 %    Monocytes 7.80 0.00 - 13.40 %    Eosinophils 0.00 0.00 - 6.90 %    Basophils 0.10 0.00 - 1.80 %    Immature Granulocytes 0.40 0.00 - 0.90 %    Nucleated RBC 0.00 /100 WBC    Neutrophils (Absolute) 7.65 (H) 2.00 - 7.15 K/uL    Lymphs (Absolute) 0.86 (L) 1.00 - 4.80 K/uL    Monos (Absolute) 0.72 0.00 - 0.85 K/uL    Eos (Absolute) 0.00 0.00 - 0.51 K/uL    Baso (Absolute) 0.01 0.00 - 0.12 K/uL    Immature Granulocytes (abs) 0.04 0.00 - 0.11 K/uL     NRBC (Absolute) 0.00 K/uL   COMP METABOLIC PANEL   Result Value Ref Range    Sodium 140 135 - 145 mmol/L    Potassium 2.0 (LL) 3.6 - 5.5 mmol/L    Chloride 119 (H) 96 - 112 mmol/L    Co2 12 (L) 20 - 33 mmol/L    Anion Gap 9.0 0.0 - 11.9    Glucose 83 65 - 99 mg/dL    Bun 14 8 - 22 mg/dL    Creatinine 0.39 (L) 0.50 - 1.40 mg/dL    Calcium 5.5 (LL) 8.4 - 10.2 mg/dL    AST(SGOT) 10 (L) 12 - 45 U/L    ALT(SGPT) 9 2 - 50 U/L    Alkaline Phosphatase 40 30 - 99 U/L    Total Bilirubin 0.3 0.1 - 1.5 mg/dL    Albumin 2.5 (L) 3.2 - 4.9 g/dL    Total Protein 4.2 (L) 6.0 - 8.2 g/dL    Globulin 1.7 (L) 1.9 - 3.5 g/dL    A-G Ratio 1.5 g/dL   URINALYSIS   Result Value Ref Range    Micro Urine Req Microscopic     Color Yellow     Character Clear     Specific Gravity 1.025 <1.035    Ph 6.0 5.0-8.0    Glucose Negative Negative mg/dL    Ketones Trace (A) Negative mg/dL    Protein 100 (A) Negative mg/dL    Bilirubin Negative Negative    Nitrite Negative Negative    Leukocyte Esterase Trace (A) Negative    Occult Blood Trace (A) Negative   LIPASE   Result Value Ref Range    Lipase 13 7 - 58 U/L   URINE MICROSCOPIC (W/UA)   Result Value Ref Range    WBC 2-5 /hpf    RBC 2-5 (A) /hpf    Bacteria Rare (A) None /hpf    Epithelial Cells Rare Few /hpf    Mucous Threads Few /hpf    Urine Crystals Rare Amorphous /hpf       INTERVENTIONS: Indication dehydration and vomiting and pain  Medications   NS infusion ( Intravenous New Bag 6/3/17 1522)   potassium chloride in water (KCL) ivpb 10 mEq (10 mEq Intravenous New Bag 6/3/17 1623)   ondansetron (ZOFRAN) syringe/vial injection 4 mg (4 mg Intravenous Given 6/3/17 1311)   HYDROmorphone (DILAUDID) injection 1 mg (1 mg Intravenous Given 6/3/17 1311)   NS infusion 1,000 mL (0 mL Intravenous Stopped 6/3/17 1501)   HYDROmorphone (DILAUDID) injection 0.5 mg (0.5 mg Intravenous Given 6/3/17 1537)   ondansetron (ZOFRAN) syringe/vial injection 4 mg (4 mg Intravenous Given 6/3/17 1578)   Ace discussed with   Nas hospitalist to admit the patient for IV fluids, bowel rest, IV potassium  Response: Improvement in pain and nausea.    COURSE & MEDICAL DECISION MAKING  Ill-appearing patient presents with a week of abdominal pain and vomiting and has quite severe dehydration with severe hypokalemia. Despite her normal lipase this is likely a flare of chronic pancreatitis. There is no evidence of perforated ulcer and gastritis or peptic ulcer disease are less likely. There is no pneumonia. There is no hypoxia. There is no pyelonephritis..    PLAN:  As above    CONDITION: Fair.    FINAL IMPRESSION  1. Left upper quadrant pain    2. Hypokalemia    3. Non-intractable vomiting with nausea, unspecified vomiting type    4. Dehydration          Electronically signed by: Vamshi Molina, 6/3/2017 1:09 PM

## 2017-06-03 NOTE — IP AVS SNAPSHOT
" <p align=\"LEFT\"><IMG SRC=\"//[email or web address]$/[email or web address]\" alt=\"Image\" WIDTH=\"50%\" HEIGHT=\"200\" BORDER=\"\"></p>                   Name:Antonieta Addison  Medical Record Number:[id number]  CSN: [id number]    YOB: 1955   Age: 62 y.o.  Sex: female  HT:1.499 m (4' 11\") WT: 41.7 kg (91 lb 14.9 oz)          Admit Date: 6/3/2017     Discharge Date:   Today's Date: 6/9/2017  Attending Doctor:  Mikhail Lowry M.D.                  Allergies:  Iodine and Tape          Follow-up Information     1. Follow up with Nathan Gloria M.D..    Specialty:  Gastroenterology    Why:  RN called and left message with office nurse to make appt & call back ASAP    Contact information    [address] [place] [address]  [phone]           Medication List      Take these Medications        Instructions    aspirin 81 MG tablet    Take 81 mg by mouth every day.   Dose:  81 mg       NON SPECIFIED    Take 2 Tabs by mouth every 6 hours as needed. OTC - Cold Medication   Dose:  2 Tab       omeprazole 20 MG delayed-release capsule   Commonly known as:  PRILOSEC    Take 1 Cap by mouth every day.   Dose:  20 mg       ondansetron 4 MG Tbdp   Commonly known as:  ZOFRAN ODT    Take 1 Tab by mouth every four hours as needed for Nausea/Vomiting (give PO if no IV route available).   Dose:  4 mg       * oxycodone 15 MG immediate release tablet   What changed:  when to take this   Commonly known as:  OXY-IR    Take 1 Tab by mouth every 6 hours as needed for Severe Pain.   Dose:  15 mg       * oxyCODONE CR 10 MG T12a   What changed:  You were already taking a medication with the same name, and this prescription was added. Make sure you understand how and when to take each.   Commonly known as:  OXYCONTIN    Take 1 Tab by mouth every 12 hours.   Dose:  10 mg       pancrelipase (Lip-Prot-Amyl) 6000 UNITS Cpep   Commonly known as:  CREON 6000    Take 2 Caps by mouth 3 times a day, with meals.   Dose:  2 Cap       * Notice:  This list has " 2 medication(s) that are the same as other medications prescribed for you. Read the directions carefully, and ask your doctor or other care provider to review them with you.

## 2017-06-03 NOTE — IP AVS SNAPSHOT
[email or web address] Access Code: [email or web address]  Expires: 7/9/2017  2:17 PM    Broadcast.com  A secure, online tool to manage your health information     [employer]’s Broadcast.com® is a secure, online tool that connects you to your personalized health information from the privacy of your home -- day or night - making it very easy for you to manage your healthcare. Once the activation process is completed, you can even access your medical information using the Broadcast.com paco, which is available for free in the Apple Paco store or Google Play store.     Broadcast.com provides the following levels of access (as shown below):   My Chart Features   [facility] Primary Care Doctor [facility]  Specialists [facility]  Urgent  Care Non-[facility]  Primary Care  Doctor   Email your healthcare team securely and privately 24/7 X X X X   Manage appointments: schedule your next appointment; view details of past/upcoming appointments X      Request prescription refills. X      View recent personal medical records, including lab and immunizations X X X X   View health record, including health history, allergies, medications X X X X   Read reports about your outpatient visits, procedures, consult and ER notes X X X X   See your discharge summary, which is a recap of your hospital and/or ER visit that includes your diagnosis, lab results, and care plan. X X       How to register for Broadcast.com:  1. Go to  [email or web address].  2. Click on the Sign Up Now box, which takes you to the New Member Sign Up page. You will need to provide the following information:  a. Enter your Broadcast.com Access Code exactly as it appears at the top of this page. (You will not need to use this code after you’ve completed the sign-up process. If you do not sign up before the expiration date, you must request a new code.)   b. Enter your date of birth.   c. Enter your home email address.   d. Click Submit, and follow the next screen’s instructions.  3. Create a Broadcast.com ID. This will be your Broadcast.com  login ID and cannot be changed, so think of one that is secure and easy to remember.  4. Create a [employer] password. You can change your password at any time.  5. Enter your Password Reset Question and Answer. This can be used at a later time if you forget your password.   6. Enter your e-mail address. This allows you to receive e-mail notifications when new information is available in [employer].  7. Click Sign Up. You can now view your health information.    For assistance activating your [employer] account, call [phone]

## 2017-06-03 NOTE — ED NOTES
1342:  Assisted w/ pt care.  Second PIV placed in RFA, BC x 1 and blood sent to lab  1355:  Urine collected and sent to lab

## 2017-06-04 ENCOUNTER — APPOINTMENT (OUTPATIENT)
Dept: RADIOLOGY | Facility: MEDICAL CENTER | Age: 62
DRG: 439 | End: 2017-06-04
Attending: INTERNAL MEDICINE
Payer: COMMERCIAL

## 2017-06-04 PROBLEM — D64.9 NORMOCYTIC ANEMIA: Status: ACTIVE | Noted: 2017-06-04

## 2017-06-04 PROBLEM — E87.29 HYPERCHLOREMIC ACIDOSIS: Status: ACTIVE | Noted: 2017-06-04

## 2017-06-04 PROBLEM — K86.1 ACUTE ON CHRONIC PANCREATITIS (HCC): Status: ACTIVE | Noted: 2017-06-04

## 2017-06-04 PROBLEM — K85.90 ACUTE ON CHRONIC PANCREATITIS (HCC): Status: ACTIVE | Noted: 2017-06-04

## 2017-06-04 LAB
ANION GAP SERPL CALC-SCNC: 5 MMOL/L (ref 0–11.9)
ANION GAP SERPL CALC-SCNC: 6 MMOL/L (ref 0–11.9)
ANION GAP SERPL CALC-SCNC: 8 MMOL/L (ref 0–11.9)
BUN SERPL-MCNC: 10 MG/DL (ref 8–22)
BUN SERPL-MCNC: 12 MG/DL (ref 8–22)
BUN SERPL-MCNC: 7 MG/DL (ref 8–22)
CALCIUM SERPL-MCNC: 7.2 MG/DL (ref 8.4–10.2)
CALCIUM SERPL-MCNC: 7.5 MG/DL (ref 8.4–10.2)
CALCIUM SERPL-MCNC: 7.5 MG/DL (ref 8.4–10.2)
CHLORIDE SERPL-SCNC: 113 MMOL/L (ref 96–112)
CHLORIDE SERPL-SCNC: 113 MMOL/L (ref 96–112)
CHLORIDE SERPL-SCNC: 116 MMOL/L (ref 96–112)
CO2 SERPL-SCNC: 15 MMOL/L (ref 20–33)
CO2 SERPL-SCNC: 20 MMOL/L (ref 20–33)
CO2 SERPL-SCNC: 20 MMOL/L (ref 20–33)
CREAT SERPL-MCNC: 0.55 MG/DL (ref 0.5–1.4)
CREAT SERPL-MCNC: 0.67 MG/DL (ref 0.5–1.4)
CREAT SERPL-MCNC: 0.67 MG/DL (ref 0.5–1.4)
ERYTHROCYTE [DISTWIDTH] IN BLOOD BY AUTOMATED COUNT: 44.7 FL (ref 35.9–50)
GFR SERPL CREATININE-BSD FRML MDRD: >60 ML/MIN/1.73 M 2
GLUCOSE SERPL-MCNC: 87 MG/DL (ref 65–99)
GLUCOSE SERPL-MCNC: 96 MG/DL (ref 65–99)
GLUCOSE SERPL-MCNC: 98 MG/DL (ref 65–99)
HCT VFR BLD AUTO: 34.3 % (ref 37–47)
HGB BLD-MCNC: 11.5 G/DL (ref 12–16)
MCH RBC QN AUTO: 29.9 PG (ref 27–33)
MCHC RBC AUTO-ENTMCNC: 33.5 G/DL (ref 33.6–35)
MCV RBC AUTO: 89.3 FL (ref 81.4–97.8)
PLATELET # BLD AUTO: 241 K/UL (ref 164–446)
PMV BLD AUTO: 9.1 FL (ref 9–12.9)
POTASSIUM SERPL-SCNC: 3.4 MMOL/L (ref 3.6–5.5)
POTASSIUM SERPL-SCNC: 3.6 MMOL/L (ref 3.6–5.5)
POTASSIUM SERPL-SCNC: 4.2 MMOL/L (ref 3.6–5.5)
RBC # BLD AUTO: 3.84 M/UL (ref 4.2–5.4)
SODIUM SERPL-SCNC: 138 MMOL/L (ref 135–145)
SODIUM SERPL-SCNC: 139 MMOL/L (ref 135–145)
SODIUM SERPL-SCNC: 139 MMOL/L (ref 135–145)
WBC # BLD AUTO: 8.7 K/UL (ref 4.8–10.8)

## 2017-06-04 PROCEDURE — 700111 HCHG RX REV CODE 636 W/ 250 OVERRIDE (IP): Performed by: INTERNAL MEDICINE

## 2017-06-04 PROCEDURE — 80048 BASIC METABOLIC PNL TOTAL CA: CPT | Mod: 91

## 2017-06-04 PROCEDURE — 76705 ECHO EXAM OF ABDOMEN: CPT

## 2017-06-04 PROCEDURE — 700105 HCHG RX REV CODE 258: Performed by: INTERNAL MEDICINE

## 2017-06-04 PROCEDURE — 770020 HCHG ROOM/CARE - TELE (206)

## 2017-06-04 PROCEDURE — 85027 COMPLETE CBC AUTOMATED: CPT

## 2017-06-04 PROCEDURE — 700105 HCHG RX REV CODE 258: Performed by: EMERGENCY MEDICINE

## 2017-06-04 PROCEDURE — 99232 SBSQ HOSP IP/OBS MODERATE 35: CPT | Performed by: INTERNAL MEDICINE

## 2017-06-04 RX ORDER — SODIUM CHLORIDE 450 MG/100ML
INJECTION, SOLUTION INTRAVENOUS CONTINUOUS
Status: DISCONTINUED | OUTPATIENT
Start: 2017-06-04 | End: 2017-06-05

## 2017-06-04 RX ADMIN — ENOXAPARIN SODIUM 40 MG: 100 INJECTION SUBCUTANEOUS at 08:38

## 2017-06-04 RX ADMIN — METOCLOPRAMIDE 10 MG: 5 INJECTION, SOLUTION INTRAMUSCULAR; INTRAVENOUS at 23:54

## 2017-06-04 RX ADMIN — METOCLOPRAMIDE 10 MG: 5 INJECTION, SOLUTION INTRAMUSCULAR; INTRAVENOUS at 00:16

## 2017-06-04 RX ADMIN — MORPHINE SULFATE 4 MG: 4 INJECTION INTRAVENOUS at 00:16

## 2017-06-04 RX ADMIN — METOCLOPRAMIDE 10 MG: 5 INJECTION, SOLUTION INTRAMUSCULAR; INTRAVENOUS at 05:00

## 2017-06-04 RX ADMIN — SODIUM CHLORIDE: 4.5 INJECTION, SOLUTION INTRAVENOUS at 12:43

## 2017-06-04 RX ADMIN — METOCLOPRAMIDE 10 MG: 5 INJECTION, SOLUTION INTRAMUSCULAR; INTRAVENOUS at 17:10

## 2017-06-04 RX ADMIN — MORPHINE SULFATE 4 MG: 4 INJECTION INTRAVENOUS at 08:39

## 2017-06-04 RX ADMIN — MORPHINE SULFATE 4 MG: 4 INJECTION INTRAVENOUS at 04:31

## 2017-06-04 RX ADMIN — MORPHINE SULFATE 4 MG: 4 INJECTION INTRAVENOUS at 17:10

## 2017-06-04 RX ADMIN — MORPHINE SULFATE 4 MG: 4 INJECTION INTRAVENOUS at 21:13

## 2017-06-04 RX ADMIN — MORPHINE SULFATE 4 MG: 4 INJECTION INTRAVENOUS at 12:43

## 2017-06-04 RX ADMIN — SODIUM CHLORIDE: 9 INJECTION, SOLUTION INTRAVENOUS at 03:17

## 2017-06-04 RX ADMIN — METOCLOPRAMIDE 10 MG: 5 INJECTION, SOLUTION INTRAMUSCULAR; INTRAVENOUS at 12:17

## 2017-06-04 ASSESSMENT — ENCOUNTER SYMPTOMS
CHILLS: 0
ABDOMINAL PAIN: 1
STRIDOR: 0
VOMITING: 1
SPUTUM PRODUCTION: 0
MYALGIAS: 0
CONSTIPATION: 0
TINGLING: 0
SHORTNESS OF BREATH: 0
LOSS OF CONSCIOUSNESS: 0
DIARRHEA: 0
DEPRESSION: 0
WEAKNESS: 0
FEVER: 0
PALPITATIONS: 0
FALLS: 0
HEADACHES: 0
NAUSEA: 1
DIZZINESS: 0
COUGH: 0

## 2017-06-04 ASSESSMENT — PAIN SCALES - GENERAL
PAINLEVEL_OUTOF10: 7
PAINLEVEL_OUTOF10: 4
PAINLEVEL_OUTOF10: 8
PAINLEVEL_OUTOF10: 0
PAINLEVEL_OUTOF10: 8
PAINLEVEL_OUTOF10: 8

## 2017-06-04 NOTE — PROGRESS NOTES
Bedside report received from Timo DARNELL. Pt resting comfortably in bed. Safety precautions in place.

## 2017-06-04 NOTE — PROGRESS NOTES
Assessment completed. See flowsheet. Meds refused. Pt resting comfortably in bed. Safety precautions in place.

## 2017-06-04 NOTE — CARE PLAN
Problem: Safety  Goal: Will remain free from injury  Outcome: PROGRESSING AS EXPECTED  Pt remains free from accidental injury.  Pt uses call light approprietly.  Safety precautions in place: anti slip socks on, bed in low position, call light/personal belongings w/in reach, hourly rounding, room next to nursing station.    Problem: Knowledge Deficit  Goal: Knowledge of disease process/condition, treatment plan, diagnostic tests, and medications will improve  Outcome: PROGRESSING AS EXPECTED  Discussed POC, tx, and meds.  Pt verbalizes understanding of disease process.  Encouraged pt to ask questions.

## 2017-06-04 NOTE — ASSESSMENT & PLAN NOTE
Advance diet to fulls      Gi f/u  Added pancreatic enzymes today with each meal  - continue symptomatic care

## 2017-06-04 NOTE — PROGRESS NOTES
Report received from María at 1633 and pt arrived about 1650.  Pt able to walk from the gurney to the bed.  Pt states she has been vomitting and having diarrhea for over a week and just not able to get out of bed.  Admit profile completed by Tali and went to give the scheduled medications, but pt refused all oral medications including her pain medications; she states she has not been able to keep any pills down.  Paged Dr. Pollock and received orders for morphine and medicated with 2mg to start with.  Pt has tolerated sips of water thus far. Pt has been showing SB to SR with frequent PVCs on the monitor.  Pt has not voided since the ED.  Report given to Brett at bedside.

## 2017-06-04 NOTE — PROGRESS NOTES
Pt's tele summary: sinus rhythm/ralph w/no ectopy.      HR 49      OH interval 0.16  QRS complex 0.08  QT interval 0.44      Pt is asymptomatic for sinus ralph, will CTM.

## 2017-06-04 NOTE — PROGRESS NOTES
Received bedside report from NOC nurseJesse.  Discussed POC w/pt and RN.  Pt resting in bed, semi-fowlers, no s/s of acute distress, respirations even and unlabored, on room air, all lines patent, IVF infusing, denies any immediate needs, calm and cooperative, personal belongings w/in reach, safety precautions in place, assumed pt care, will CTM.

## 2017-06-04 NOTE — PROGRESS NOTES
[facility] Hospitalist Progress Note    Date of Service: 2017    Chief Complaint  62 y.o. female admitted 6/3/2017 with abdominal pain with N/V.    Interval Problem Update  Pt with hx of chronic pancreatitis, now with acute attack.  Pt states she is improved today.  Started having pain 8 days ago.  Discussed plan and pt condition with RN at bedside and social work.    Consultants/Specialty  none    Disposition  Pt requires additional treatment in hospital.        Review of Systems   Constitutional: Negative for fever, chills and malaise/fatigue.   HENT: Negative for congestion.    Respiratory: Negative for cough, sputum production, shortness of breath and stridor.    Cardiovascular: Negative for chest pain, palpitations and leg swelling.   Gastrointestinal: Positive for nausea, vomiting and abdominal pain. Negative for diarrhea and constipation.   Genitourinary: Negative for dysuria and urgency.   Musculoskeletal: Negative for myalgias and falls.   Neurological: Negative for dizziness, tingling, loss of consciousness, weakness and headaches.   Psychiatric/Behavioral: Negative for depression and suicidal ideas.      Physical Exam  Laboratory/Imaging   Hemodynamics  Temp (24hrs), Av.2 °C (99 °F), Min:36.7 °C (98 °F), Max:37.8 °C (100 °F)   Temperature: 36.8 °C (98.2 °F)  Pulse  Av.2  Min: 51  Max: 120 Heart Rate (Monitored): 63  Blood Pressure: 110/60 mmHg, NIBP: (!) 94/68 mmHg      Respiratory      Respiration: 16, Pulse Oximetry: 92 %, O2 Daily Delivery Respiratory : Room Air with O2 Available             Fluids    Intake/Output Summary (Last 24 hours) at 17 1200  Last data filed at 17 1900   Gross per 24 hour   Intake   1220 ml   Output      0 ml   Net   1220 ml       Nutrition  Orders Placed This Encounter   Procedures   • Diet Order     Standing Status: Standing      Number of Occurrences: 1      Standing Expiration Date:      Order Specific Question:  Diet:     Answer:  Clear Liquid [10]      Physical Exam   Constitutional: She is oriented to person, place, and time. She appears well-developed. No distress.   HENT:   Head: Normocephalic and atraumatic.   Mouth/Throat: Oropharynx is clear and moist. No oropharyngeal exudate.   Eyes: Right eye exhibits no discharge. Left eye exhibits no discharge.   Neck: Neck supple. No tracheal deviation present.   Cardiovascular: Normal rate and regular rhythm.  Exam reveals no gallop and no friction rub.    No murmur heard.  Pulmonary/Chest: Effort normal and breath sounds normal. No stridor. No respiratory distress. She has no wheezes. She has no rales. She exhibits no tenderness.   Abdominal: Soft. Bowel sounds are normal. She exhibits no distension. There is tenderness.   Musculoskeletal: Normal range of motion. She exhibits no edema or tenderness.   Lymphadenopathy:     She has no cervical adenopathy.   Neurological: She is alert and oriented to person, place, and time. No cranial nerve deficit.   Skin: Skin is warm and dry. No rash noted. She is not diaphoretic. No erythema.   Psychiatric: She has a normal mood and affect. Her behavior is normal. Judgment and thought content normal.   Nursing note and vitals reviewed.      Recent Labs      06/03/17   1342  06/04/17   0157   WBC  9.3  8.7   RBC  3.71*  3.84*   HEMOGLOBIN  11.1*  11.5*   HEMATOCRIT  31.7*  34.3*   MCV  85.4  89.3   MCH  29.9  29.9   MCHC  35.0  33.5*   RDW  42.5  44.7   PLATELETCT  248  241   MPV  8.9*  9.1     Recent Labs      06/03/17   1940  06/04/17   0157  06/04/17   0817   SODIUM  138  139  139   POTASSIUM  4.6  4.2  3.6   CHLORIDE  110  113*  116*   CO2  20  20  15*   GLUCOSE  99  98  87   BUN  15  12  10   CREATININE  0.71  0.67  0.67   CALCIUM  7.5*  7.2*  7.5*                      Assessment/Plan     Acute on chronic pancreatitis (CMS-HCC)  Assessment & Plan  - still with pain but improved  - will continue clears but stop if worse pain  - tomorrow advance diet if able  - continue  symptomatic care    Hypokalemia  Assessment & Plan  - was profound but now resolved  - closely monitor    Normocytic anemia  Assessment & Plan  - no sign of gross bleeding  - repeat cbc in am    Hyperchloremic acidosis  Assessment & Plan  - getting worse  - will change to 1/2 NS  - repeat bmp in am      Labs reviewed, Medications reviewed and Radiology images reviewed        DVT Prophylaxis: Enoxaparin (Lovenox)

## 2017-06-04 NOTE — PROGRESS NOTES
Bedside report given to Petty DARNELL. Pt resting comfortably in bed. Safety precautions in place.

## 2017-06-04 NOTE — CARE PLAN
Problem: Safety  Goal: Will remain free from falls  Outcome: PROGRESSING AS EXPECTED  Pt's gait assessed. Pt encouraged to call for assistance if needed. Hourly rounding. Safety precautions in place.    Problem: Pain Management  Goal: Pain level will decrease to patient’s comfort goal  Outcome: PROGRESSING AS EXPECTED  Pain level assessed. Pt encouraged to voice pain. Pharmacologic and non pharmacologic pain interventions used PRN.

## 2017-06-05 LAB
ANION GAP SERPL CALC-SCNC: 7 MMOL/L (ref 0–11.9)
BACTERIA UR CULT: NORMAL
BUN SERPL-MCNC: <5 MG/DL (ref 8–22)
CALCIUM SERPL-MCNC: 7.9 MG/DL (ref 8.4–10.2)
CHLORIDE SERPL-SCNC: 109 MMOL/L (ref 96–112)
CO2 SERPL-SCNC: 21 MMOL/L (ref 20–33)
CREAT SERPL-MCNC: 0.65 MG/DL (ref 0.5–1.4)
GFR SERPL CREATININE-BSD FRML MDRD: >60 ML/MIN/1.73 M 2
GLUCOSE SERPL-MCNC: 122 MG/DL (ref 65–99)
POTASSIUM SERPL-SCNC: 3.1 MMOL/L (ref 3.6–5.5)
SIGNIFICANT IND 70042: NORMAL
SITE SITE: NORMAL
SODIUM SERPL-SCNC: 137 MMOL/L (ref 135–145)
SOURCE SOURCE: NORMAL

## 2017-06-05 PROCEDURE — 700111 HCHG RX REV CODE 636 W/ 250 OVERRIDE (IP): Performed by: INTERNAL MEDICINE

## 2017-06-05 PROCEDURE — 700101 HCHG RX REV CODE 250: Performed by: INTERNAL MEDICINE

## 2017-06-05 PROCEDURE — 770020 HCHG ROOM/CARE - TELE (206)

## 2017-06-05 PROCEDURE — 700105 HCHG RX REV CODE 258: Performed by: INTERNAL MEDICINE

## 2017-06-05 PROCEDURE — 99232 SBSQ HOSP IP/OBS MODERATE 35: CPT | Performed by: INTERNAL MEDICINE

## 2017-06-05 PROCEDURE — 80048 BASIC METABOLIC PNL TOTAL CA: CPT

## 2017-06-05 RX ORDER — POTASSIUM CHLORIDE 7.45 MG/ML
10 INJECTION INTRAVENOUS
Status: COMPLETED | OUTPATIENT
Start: 2017-06-05 | End: 2017-06-05

## 2017-06-05 RX ORDER — POTASSIUM CHLORIDE 7.45 MG/ML
10 INJECTION INTRAVENOUS ONCE
Status: DISPENSED | OUTPATIENT
Start: 2017-06-05 | End: 2017-06-06

## 2017-06-05 RX ORDER — SODIUM CHLORIDE AND POTASSIUM CHLORIDE 150; 900 MG/100ML; MG/100ML
INJECTION, SOLUTION INTRAVENOUS CONTINUOUS
Status: DISCONTINUED | OUTPATIENT
Start: 2017-06-05 | End: 2017-06-09 | Stop reason: HOSPADM

## 2017-06-05 RX ADMIN — HYDROMORPHONE HYDROCHLORIDE 1 MG: 1 INJECTION, SOLUTION INTRAMUSCULAR; INTRAVENOUS; SUBCUTANEOUS at 12:13

## 2017-06-05 RX ADMIN — METOCLOPRAMIDE 10 MG: 5 INJECTION, SOLUTION INTRAMUSCULAR; INTRAVENOUS at 23:21

## 2017-06-05 RX ADMIN — HYDROMORPHONE HYDROCHLORIDE 1 MG: 1 INJECTION, SOLUTION INTRAMUSCULAR; INTRAVENOUS; SUBCUTANEOUS at 08:54

## 2017-06-05 RX ADMIN — MORPHINE SULFATE 4 MG: 4 INJECTION INTRAVENOUS at 05:17

## 2017-06-05 RX ADMIN — POTASSIUM CHLORIDE AND SODIUM CHLORIDE: 900; 150 INJECTION, SOLUTION INTRAVENOUS at 14:43

## 2017-06-05 RX ADMIN — METOCLOPRAMIDE 10 MG: 5 INJECTION, SOLUTION INTRAMUSCULAR; INTRAVENOUS at 11:22

## 2017-06-05 RX ADMIN — METOCLOPRAMIDE 10 MG: 5 INJECTION, SOLUTION INTRAMUSCULAR; INTRAVENOUS at 18:20

## 2017-06-05 RX ADMIN — SODIUM CHLORIDE: 4.5 INJECTION, SOLUTION INTRAVENOUS at 08:53

## 2017-06-05 RX ADMIN — POTASSIUM CHLORIDE 10 MEQ: 7.46 INJECTION, SOLUTION INTRAVENOUS at 08:53

## 2017-06-05 RX ADMIN — POTASSIUM CHLORIDE 10 MEQ: 7.46 INJECTION, SOLUTION INTRAVENOUS at 13:34

## 2017-06-05 RX ADMIN — METOCLOPRAMIDE 10 MG: 5 INJECTION, SOLUTION INTRAMUSCULAR; INTRAVENOUS at 05:17

## 2017-06-05 RX ADMIN — MORPHINE SULFATE 4 MG: 4 INJECTION INTRAVENOUS at 01:14

## 2017-06-05 RX ADMIN — HYDROMORPHONE HYDROCHLORIDE 1 MG: 1 INJECTION, SOLUTION INTRAMUSCULAR; INTRAVENOUS; SUBCUTANEOUS at 21:17

## 2017-06-05 RX ADMIN — HYDROMORPHONE HYDROCHLORIDE 1 MG: 1 INJECTION, SOLUTION INTRAMUSCULAR; INTRAVENOUS; SUBCUTANEOUS at 18:25

## 2017-06-05 RX ADMIN — ENOXAPARIN SODIUM 40 MG: 100 INJECTION SUBCUTANEOUS at 08:53

## 2017-06-05 RX ADMIN — POTASSIUM CHLORIDE 10 MEQ: 7.46 INJECTION, SOLUTION INTRAVENOUS at 12:13

## 2017-06-05 RX ADMIN — HYDROMORPHONE HYDROCHLORIDE 1 MG: 1 INJECTION, SOLUTION INTRAMUSCULAR; INTRAVENOUS; SUBCUTANEOUS at 15:19

## 2017-06-05 RX ADMIN — POTASSIUM CHLORIDE 10 MEQ: 7.46 INJECTION, SOLUTION INTRAVENOUS at 11:08

## 2017-06-05 ASSESSMENT — PAIN SCALES - GENERAL
PAINLEVEL_OUTOF10: 7
PAINLEVEL_OUTOF10: 3
PAINLEVEL_OUTOF10: 8
PAINLEVEL_OUTOF10: 8
PAINLEVEL_OUTOF10: 4
PAINLEVEL_OUTOF10: 4
PAINLEVEL_OUTOF10: 7

## 2017-06-05 ASSESSMENT — ENCOUNTER SYMPTOMS
NAUSEA: 1
FEVER: 0
STRIDOR: 0
SPUTUM PRODUCTION: 0
CHILLS: 0
FALLS: 0
VOMITING: 1
NECK PAIN: 0
MYALGIAS: 0
DIZZINESS: 0
PALPITATIONS: 0
SHORTNESS OF BREATH: 0
DEPRESSION: 0
LOSS OF CONSCIOUSNESS: 0
ABDOMINAL PAIN: 1

## 2017-06-05 NOTE — CARE PLAN
Problem: Communication  Goal: The ability to communicate needs accurately and effectively will improve  Outcome: PROGRESSING AS EXPECTED  Patient able to communicate all needs. Pt oriented to room and call bell. Verblized understanding

## 2017-06-05 NOTE — PROGRESS NOTES
Gave report to Maria Esther RAY RN.  Discussed POC.  Pt sleeping in bed, semi-fowlers, no s/s of acute distress, all lines patent, IVF infusing, personal belongings w/in reach, safety precautions in place.

## 2017-06-05 NOTE — PROGRESS NOTES
Pt's tele summary: sinus ralph w/no ectopy.          HR 51    MN interval 0.14  QRS complex 0.08  QT interval 0.44      Pt is asymptomatic for sinus ralph, will CTM.

## 2017-06-05 NOTE — DISCHARGE PLANNING
Patient discussed in morning rounds. Patient reportedly lives at home with her spouse and plans to return home when medically able.  No current SS needs noted.
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
## 2017-06-05 NOTE — PROGRESS NOTES
[facility] Hospitalist Progress Note    Date of Service: 2017    Chief Complaint  62 y.o. female admitted 6/3/2017 with abdominal pain with N/V.    Interval Problem Update  Pt with hx of chronic pancreatitis, now with acute attack.  Pt states she is worse today.  Started having pain 8 days prior to admission.  Discussed plan and pt condition with RN at bedside and social work.    Consultants/Specialty  none    Disposition  Pt requires additional treatment in hospital.        Review of Systems   Constitutional: Negative for fever, chills and malaise/fatigue.   HENT: Negative for congestion.    Respiratory: Negative for sputum production, shortness of breath and stridor.    Cardiovascular: Negative for chest pain and palpitations.   Gastrointestinal: Positive for nausea, vomiting and abdominal pain.   Genitourinary: Negative for dysuria, urgency and frequency.   Musculoskeletal: Negative for myalgias, falls and neck pain.   Neurological: Negative for dizziness and loss of consciousness.   Psychiatric/Behavioral: Negative for depression and suicidal ideas.      Physical Exam  Laboratory/Imaging   Hemodynamics  Temp (24hrs), Av.9 °C (98.4 °F), Min:36.8 °C (98.2 °F), Max:37.1 °C (98.8 °F)   Temperature: 37.1 °C (98.8 °F)  Pulse  Av.2  Min: 50  Max: 120    Blood Pressure: 131/74 mmHg      Respiratory      Respiration: 18, Pulse Oximetry: 90 %             Fluids    Intake/Output Summary (Last 24 hours) at 17 1414  Last data filed at 17 2200   Gross per 24 hour   Intake    400 ml   Output      0 ml   Net    400 ml       Nutrition  Orders Placed This Encounter   Procedures   • DIET NPO     Standing Status: Standing      Number of Occurrences: 1      Standing Expiration Date:      Order Specific Question:  Restrict to:     Answer:  Strict [1]     Physical Exam   Constitutional: She is oriented to person, place, and time. No distress.   HENT:   Head: Normocephalic and atraumatic.   Mouth/Throat: No  oropharyngeal exudate.   Eyes: Right eye exhibits no discharge. Left eye exhibits no discharge. No scleral icterus.   Neck: Neck supple.   Cardiovascular: Normal rate and regular rhythm.    No murmur heard.  Pulmonary/Chest: Effort normal and breath sounds normal. No stridor. No respiratory distress. She has no wheezes. She exhibits no tenderness.   Abdominal: Soft. Bowel sounds are normal. There is tenderness.   Musculoskeletal: She exhibits no edema or tenderness.   Lymphadenopathy:     She has no cervical adenopathy.   Neurological: She is alert and oriented to person, place, and time.   Skin: Skin is warm and dry. She is not diaphoretic. No erythema.   Psychiatric: She has a normal mood and affect. Her behavior is normal. Judgment normal.   Nursing note and vitals reviewed.      Recent Labs      06/03/17   1342  06/04/17   0157   WBC  9.3  8.7   RBC  3.71*  3.84*   HEMOGLOBIN  11.1*  11.5*   HEMATOCRIT  31.7*  34.3*   MCV  85.4  89.3   MCH  29.9  29.9   MCHC  35.0  33.5*   RDW  42.5  44.7   PLATELETCT  248  241   MPV  8.9*  9.1     Recent Labs      06/04/17   0817  06/04/17   1412  06/05/17   0819   SODIUM  139  138  137   POTASSIUM  3.6  3.4*  3.1*   CHLORIDE  116*  113*  109   CO2  15*  20  21   GLUCOSE  87  96  122*   BUN  10  7*  <5*   CREATININE  0.67  0.55  0.65   CALCIUM  7.5*  7.5*  7.9*                      Assessment/Plan     Acute on chronic pancreatitis (CMS-HCC)  Assessment & Plan  - worse today, change to strict NPO  - tomorrow advance diet if able  - continue symptomatic care    Hypokalemia  Assessment & Plan  - worse, will place 20 meq of k in ivf  - repeat bmp in am    Normocytic anemia  Assessment & Plan  - no sign of gross bleeding  - repeat cbc in am    Hyperchloremic acidosis  Assessment & Plan  - getting worse  - will change to 1/2 NS  - repeat bmp in am      Labs reviewed, Medications reviewed and Radiology images reviewed        DVT Prophylaxis: Enoxaparin (Lovenox)

## 2017-06-05 NOTE — PROGRESS NOTES
Late entry due to priority of care:    Telestrip analysis:  Patient is in sinus bradycardia, no ectopy seen, 0.14/0.08/0.44    1900: Bedside shift report received by off going RN, POC/events/order/lines reviewed. Pt sleeping/resting. No signs of distress. Pt bed in lowest position, locked, with all belongings/call bell within reach.     2000: Assessment completed. Pt having tenderness in abdomen 8/10, she states she is waiting until morphine due. Pt AOx4, refusing all her PO meds due to nausea.     2100: Pt pain medication given as per MAR.    2200: Pt resting, pain improved at this time, 4/10.     0100: Pt complaining of 7/10 pain. Pain medication given as per MAR    0300: Pt pain level improved 3/10, resting no other requests at this time.     0520: Pt complaining of pain 8/10, medications given as per MAR. No other requests at this time

## 2017-06-05 NOTE — DIETARY
"Nutrition services.  Pt with BMI <19 at 18.56.  Admit triggers for poor po and unplanned wt loss.  62 yr old female with abdominal pain with N/V, acute and chronic pancreatitis, hypokalemia.  Diet:  NPO  Labs and medications reviewed. 6/5 potassium 3.1  Ht:  4'11\"  Wt:  41.7 kg (92 lbs)  UBW:  98 lbs  Pt presents with a 6 lb (6%) decrease from her stated UBW.  States she has had a poor intake for 10 days prior to admission.    RD to monitor diet advancement and clinical course.     "

## 2017-06-06 ENCOUNTER — APPOINTMENT (OUTPATIENT)
Dept: RADIOLOGY | Facility: MEDICAL CENTER | Age: 62
DRG: 439 | End: 2017-06-06
Attending: HOSPITALIST
Payer: COMMERCIAL

## 2017-06-06 LAB
ANION GAP SERPL CALC-SCNC: 13 MMOL/L (ref 0–11.9)
BUN SERPL-MCNC: 11 MG/DL (ref 8–22)
CALCIUM SERPL-MCNC: 8.1 MG/DL (ref 8.4–10.2)
CHLORIDE SERPL-SCNC: 107 MMOL/L (ref 96–112)
CO2 SERPL-SCNC: 17 MMOL/L (ref 20–33)
CREAT SERPL-MCNC: 0.68 MG/DL (ref 0.5–1.4)
GFR SERPL CREATININE-BSD FRML MDRD: >60 ML/MIN/1.73 M 2
GLUCOSE SERPL-MCNC: 71 MG/DL (ref 65–99)
POTASSIUM SERPL-SCNC: 4 MMOL/L (ref 3.6–5.5)
SODIUM SERPL-SCNC: 137 MMOL/L (ref 135–145)

## 2017-06-06 PROCEDURE — 78227 HEPATOBIL SYST IMAGE W/DRUG: CPT

## 2017-06-06 PROCEDURE — A9270 NON-COVERED ITEM OR SERVICE: HCPCS | Performed by: INTERNAL MEDICINE

## 2017-06-06 PROCEDURE — 700111 HCHG RX REV CODE 636 W/ 250 OVERRIDE (IP): Performed by: INTERNAL MEDICINE

## 2017-06-06 PROCEDURE — 700102 HCHG RX REV CODE 250 W/ 637 OVERRIDE(OP): Performed by: HOSPITALIST

## 2017-06-06 PROCEDURE — 700102 HCHG RX REV CODE 250 W/ 637 OVERRIDE(OP): Performed by: INTERNAL MEDICINE

## 2017-06-06 PROCEDURE — 770020 HCHG ROOM/CARE - TELE (206)

## 2017-06-06 PROCEDURE — 80048 BASIC METABOLIC PNL TOTAL CA: CPT

## 2017-06-06 PROCEDURE — A9270 NON-COVERED ITEM OR SERVICE: HCPCS | Performed by: HOSPITALIST

## 2017-06-06 PROCEDURE — 700101 HCHG RX REV CODE 250: Performed by: INTERNAL MEDICINE

## 2017-06-06 PROCEDURE — 99232 SBSQ HOSP IP/OBS MODERATE 35: CPT | Performed by: HOSPITALIST

## 2017-06-06 PROCEDURE — 700111 HCHG RX REV CODE 636 W/ 250 OVERRIDE (IP): Performed by: HOSPITALIST

## 2017-06-06 RX ORDER — OMEPRAZOLE 20 MG/1
20 CAPSULE, DELAYED RELEASE ORAL DAILY
Status: DISCONTINUED | OUTPATIENT
Start: 2017-06-06 | End: 2017-06-09 | Stop reason: HOSPADM

## 2017-06-06 RX ADMIN — ENOXAPARIN SODIUM 40 MG: 100 INJECTION SUBCUTANEOUS at 09:27

## 2017-06-06 RX ADMIN — HYDROMORPHONE HYDROCHLORIDE 1 MG: 1 INJECTION, SOLUTION INTRAMUSCULAR; INTRAVENOUS; SUBCUTANEOUS at 06:25

## 2017-06-06 RX ADMIN — HYDROMORPHONE HYDROCHLORIDE 1 MG: 1 INJECTION, SOLUTION INTRAMUSCULAR; INTRAVENOUS; SUBCUTANEOUS at 03:21

## 2017-06-06 RX ADMIN — OMEPRAZOLE 20 MG: 20 CAPSULE, DELAYED RELEASE ORAL at 14:12

## 2017-06-06 RX ADMIN — ONDANSETRON 4 MG: 2 INJECTION INTRAMUSCULAR; INTRAVENOUS at 17:56

## 2017-06-06 RX ADMIN — POTASSIUM CHLORIDE AND SODIUM CHLORIDE 1000 ML: 900; 150 INJECTION, SOLUTION INTRAVENOUS at 00:23

## 2017-06-06 RX ADMIN — Medication 400 MG: at 09:27

## 2017-06-06 RX ADMIN — HYDROMORPHONE HYDROCHLORIDE 1 MG: 1 INJECTION, SOLUTION INTRAMUSCULAR; INTRAVENOUS; SUBCUTANEOUS at 00:20

## 2017-06-06 RX ADMIN — METOCLOPRAMIDE 10 MG: 5 INJECTION, SOLUTION INTRAMUSCULAR; INTRAVENOUS at 11:33

## 2017-06-06 RX ADMIN — PROCHLORPERAZINE EDISYLATE 5 MG: 5 INJECTION INTRAMUSCULAR; INTRAVENOUS at 20:53

## 2017-06-06 RX ADMIN — HYDROMORPHONE HYDROCHLORIDE 1 MG: 1 INJECTION, SOLUTION INTRAMUSCULAR; INTRAVENOUS; SUBCUTANEOUS at 20:53

## 2017-06-06 RX ADMIN — POTASSIUM CHLORIDE AND SODIUM CHLORIDE: 900; 150 INJECTION, SOLUTION INTRAVENOUS at 11:35

## 2017-06-06 RX ADMIN — ONDANSETRON 4 MG: 2 INJECTION INTRAMUSCULAR; INTRAVENOUS at 09:24

## 2017-06-06 RX ADMIN — HYDROMORPHONE HYDROCHLORIDE 1 MG: 1 INJECTION, SOLUTION INTRAMUSCULAR; INTRAVENOUS; SUBCUTANEOUS at 17:57

## 2017-06-06 RX ADMIN — ONDANSETRON 4 MG: 2 INJECTION INTRAMUSCULAR; INTRAVENOUS at 03:27

## 2017-06-06 RX ADMIN — HYDROMORPHONE HYDROCHLORIDE 1 MG: 1 INJECTION, SOLUTION INTRAMUSCULAR; INTRAVENOUS; SUBCUTANEOUS at 09:20

## 2017-06-06 RX ADMIN — METOCLOPRAMIDE 10 MG: 5 INJECTION, SOLUTION INTRAMUSCULAR; INTRAVENOUS at 05:38

## 2017-06-06 ASSESSMENT — ENCOUNTER SYMPTOMS
TINGLING: 0
ABDOMINAL PAIN: 1
FALLS: 0
PALPITATIONS: 0
FEVER: 0
VOMITING: 0
DIZZINESS: 0
STRIDOR: 0
CHILLS: 0
LOSS OF CONSCIOUSNESS: 0
SENSORY CHANGE: 0
TREMORS: 0
SPUTUM PRODUCTION: 0
NECK PAIN: 0
DEPRESSION: 0
NAUSEA: 1
MYALGIAS: 0
SHORTNESS OF BREATH: 0

## 2017-06-06 ASSESSMENT — PAIN SCALES - GENERAL
PAINLEVEL_OUTOF10: 3
PAINLEVEL_OUTOF10: 7
PAINLEVEL_OUTOF10: 7
PAINLEVEL_OUTOF10: 3
PAINLEVEL_OUTOF10: 7
PAINLEVEL_OUTOF10: 7

## 2017-06-06 NOTE — PROGRESS NOTES
BS report received. Patient in bed, alert and oriented. No c/o pain, nausea or sob at this time. POC discussed, verbalized understanding. Bed low and locked. Call light and belonging within reach and demonstrated use of call light. Fall precaution in effect. Hourly rounding in place.

## 2017-06-06 NOTE — PROGRESS NOTES
Report received from Yokasta at bedside, pt reports that her pain is the same as when I had her on Saturday if not worse.  Dr. Lowry rounded about 0915 and ordered a HIDA scan.  Called Bo in nuclear medicine and he states that he can not do it until after 1630 as he has a full outpatient schedule.  Pain and nausea medications given as well her daily medications; pt still not wanting to take all of her po medications.  Pt showered around lunchtime and said she felt better.   prilosec ordered and given and pt reports that she has more pain on her right side than before.  Pt has been npo all day minus the sips for medications.  VSS.  Afebrile.    Tele strip at 0730 shows SB at 57.      Measurements from am strip were as follows:  ME=0.14  QRS=0.08  QT=0.42    Tele Shift Summary:    Rhythm : SR  Rate : 60s  Ectopy : Per FABY Meza, pt had no ectopy.     Telemetry monitoring strips placed in pt chart.

## 2017-06-06 NOTE — CARE PLAN
Problem: Safety  Goal: Will remain free from falls  Intervention: Implement fall precautions  Room/floor clear. Non skid socks on. Proper signs up. Bed alarm on, bed low and locked. Call light and belonging within reach. Hourly rounding in place to make sure needs are met.       Problem: Infection  Goal: Will remain free from infection  Intervention: Implement standard precautions and perform hand washing before and after patient contact  Hand washing every encounter. IV ports scrubbed with alcohol when hanging medicine. Patient watch for s/s of infection. Patient taught to report s/s of infection, verbalized understanding.       Problem: Pain Management  Goal: Pain level will decrease to patient’s comfort goal  Intervention: Follow pain managment plan developed in collaboration with patient and Interdisciplinary Team  Pain assessment Q4H or Q2H after medication intervention. Encouraged patient to report pain, verbalized understanding. Medicated PRN.       Problem: Respiratory:  Goal: Respiratory status will improve  Intervention: Educate and encourage coughing and deep breathing  Encouraged to perform coughing and deep breathing, verbalized understanding.

## 2017-06-06 NOTE — PROGRESS NOTES
[facility] Hospitalist Progress Note    Date of Service: 2017    Chief Complaint  62 y.o. female admitted 6/3/2017 with abdominal pain with N/V.    Interval Problem Update  Pt with hx of chronic pancreatitis, now with acute attack.  Pain in  Luq, sharp, intermittent, no radiation, intensity 6/10      Us ruq shows some pericholecystic fluid    Discussed plan and pt condition with RN at bedside and social work.    Consultants/Specialty  none    Disposition  Home when stable        Review of Systems   Constitutional: Negative for fever, chills and malaise/fatigue.   HENT: Negative for congestion.    Respiratory: Negative for sputum production, shortness of breath and stridor.    Cardiovascular: Negative for chest pain and palpitations.   Gastrointestinal: Positive for nausea and abdominal pain. Negative for vomiting.   Genitourinary: Negative for dysuria, urgency and frequency.   Musculoskeletal: Negative for myalgias, falls and neck pain.   Neurological: Negative for dizziness, tingling, tremors, sensory change and loss of consciousness.   Psychiatric/Behavioral: Negative for depression and suicidal ideas.   All other systems reviewed and are negative.     Physical Exam  Laboratory/Imaging   Hemodynamics  Temp (24hrs), Av.1 °C (98.7 °F), Min:36.8 °C (98.2 °F), Max:37.3 °C (99.2 °F)   Temperature: 37.3 °C (99.2 °F)  Pulse  Av.3  Min: 50  Max: 120    Blood Pressure: 129/68 mmHg      Respiratory      Respiration: 18, Pulse Oximetry: 90 %             Fluids    Intake/Output Summary (Last 24 hours) at 17 0737  Last data filed at 17 0600   Gross per 24 hour   Intake   1200 ml   Output      0 ml   Net   1200 ml       Nutrition  Orders Placed This Encounter   Procedures   • DIET NPO     Standing Status: Standing      Number of Occurrences: 1      Standing Expiration Date:      Order Specific Question:  Restrict to:     Answer:  Strict [1]     Physical Exam   Constitutional: She is oriented to person,  place, and time. No distress.   HENT:   Head: Normocephalic and atraumatic.   Mouth/Throat: No oropharyngeal exudate.   Eyes: Right eye exhibits no discharge. Left eye exhibits no discharge. No scleral icterus.   Neck: Neck supple.   Cardiovascular: Normal rate and regular rhythm.    No murmur heard.  Pulmonary/Chest: Effort normal and breath sounds normal. No stridor. No respiratory distress. She has no wheezes. She exhibits no tenderness.   Abdominal: Soft. Bowel sounds are normal. There is tenderness.   Musculoskeletal: She exhibits no edema or tenderness.   Lymphadenopathy:     She has no cervical adenopathy.   Neurological: She is alert and oriented to person, place, and time.   Skin: Skin is warm and dry. She is not diaphoretic. No erythema.   Psychiatric: She has a normal mood and affect. Her behavior is normal. Judgment normal.   Nursing note and vitals reviewed.      Recent Labs      06/03/17   1342  06/04/17   0157   WBC  9.3  8.7   RBC  3.71*  3.84*   HEMOGLOBIN  11.1*  11.5*   HEMATOCRIT  31.7*  34.3*   MCV  85.4  89.3   MCH  29.9  29.9   MCHC  35.0  33.5*   RDW  42.5  44.7   PLATELETCT  248  241   MPV  8.9*  9.1     Recent Labs      06/04/17   0817  06/04/17   1412  06/05/17   0819   SODIUM  139  138  137   POTASSIUM  3.6  3.4*  3.1*   CHLORIDE  116*  113*  109   CO2  15*  20  21   GLUCOSE  87  96  122*   BUN  10  7*  <5*   CREATININE  0.67  0.55  0.65   CALCIUM  7.5*  7.5*  7.9*                      Assessment/Plan     Acute on chronic pancreatitis (CMS-HCC)  Assessment & Plan  -  strict NPO    Check HIDa scan today  - tomorrow advance diet if able  - continue symptomatic care    Hypokalemia  Assessment & Plan  - resolved    Normocytic anemia  Assessment & Plan  - no sign of gross bleeding  - repeat cbc in am    Hyperchloremic acidosis  Assessment & Plan  hydrate      Labs reviewed, Medications reviewed and Radiology images reviewed  Lyn catheter: No Lyn      DVT Prophylaxis: Enoxaparin  (Lovenox)              Check am cbc, cmp, amylase, lipase

## 2017-06-06 NOTE — DISCHARGE PLANNING
Medical Social Work    Referral: Pt discussed at IDT rounds this AM.    Intervention: Per flowsheet, pt lives with spouse and expects to d.c home.  Possible d.c home today.  No SS needs identified nor any requests for HOLA Herron during rounds.      Plan: HOLA available for any assistance with d.c planning.

## 2017-06-07 ENCOUNTER — APPOINTMENT (OUTPATIENT)
Dept: RADIOLOGY | Facility: MEDICAL CENTER | Age: 62
DRG: 439 | End: 2017-06-07
Attending: INTERNAL MEDICINE
Payer: COMMERCIAL

## 2017-06-07 LAB
ALBUMIN SERPL BCP-MCNC: 2.7 G/DL (ref 3.2–4.9)
ALBUMIN/GLOB SERPL: 1.4 G/DL
ALP SERPL-CCNC: 52 U/L (ref 30–99)
ALT SERPL-CCNC: 8 U/L (ref 2–50)
AMYLASE SERPL-CCNC: 142 U/L (ref 25–125)
ANION GAP SERPL CALC-SCNC: 5 MMOL/L (ref 0–11.9)
AST SERPL-CCNC: 13 U/L (ref 12–45)
BASOPHILS # BLD AUTO: 0.4 % (ref 0–1.8)
BASOPHILS # BLD: 0.03 K/UL (ref 0–0.12)
BILIRUB SERPL-MCNC: 0.3 MG/DL (ref 0.1–1.5)
BUN SERPL-MCNC: 8 MG/DL (ref 8–22)
CALCIUM SERPL-MCNC: 7.8 MG/DL (ref 8.4–10.2)
CHLORIDE SERPL-SCNC: 112 MMOL/L (ref 96–112)
CO2 SERPL-SCNC: 23 MMOL/L (ref 20–33)
CREAT SERPL-MCNC: 0.65 MG/DL (ref 0.5–1.4)
EOSINOPHIL # BLD AUTO: 0.09 K/UL (ref 0–0.51)
EOSINOPHIL NFR BLD: 1.2 % (ref 0–6.9)
ERYTHROCYTE [DISTWIDTH] IN BLOOD BY AUTOMATED COUNT: 40.4 FL (ref 35.9–50)
GFR SERPL CREATININE-BSD FRML MDRD: >60 ML/MIN/1.73 M 2
GLOBULIN SER CALC-MCNC: 2 G/DL (ref 1.9–3.5)
GLUCOSE SERPL-MCNC: 167 MG/DL (ref 65–99)
HCT VFR BLD AUTO: 33.7 % (ref 37–47)
HGB BLD-MCNC: 11.5 G/DL (ref 12–16)
IMM GRANULOCYTES # BLD AUTO: 0.02 K/UL (ref 0–0.11)
IMM GRANULOCYTES NFR BLD AUTO: 0.3 % (ref 0–0.9)
LIPASE SERPL-CCNC: 13 U/L (ref 7–58)
LYMPHOCYTES # BLD AUTO: 1.41 K/UL (ref 1–4.8)
LYMPHOCYTES NFR BLD: 19.1 % (ref 22–41)
MCH RBC QN AUTO: 29.3 PG (ref 27–33)
MCHC RBC AUTO-ENTMCNC: 34.1 G/DL (ref 33.6–35)
MCV RBC AUTO: 85.8 FL (ref 81.4–97.8)
MONOCYTES # BLD AUTO: 0.57 K/UL (ref 0–0.85)
MONOCYTES NFR BLD AUTO: 7.7 % (ref 0–13.4)
NEUTROPHILS # BLD AUTO: 5.27 K/UL (ref 2–7.15)
NEUTROPHILS NFR BLD: 71.3 % (ref 44–72)
NRBC # BLD AUTO: 0 K/UL
NRBC BLD AUTO-RTO: 0 /100 WBC
PLATELET # BLD AUTO: 243 K/UL (ref 164–446)
PMV BLD AUTO: 9.5 FL (ref 9–12.9)
POTASSIUM SERPL-SCNC: 4.8 MMOL/L (ref 3.6–5.5)
PROT SERPL-MCNC: 4.7 G/DL (ref 6–8.2)
RBC # BLD AUTO: 3.93 M/UL (ref 4.2–5.4)
SODIUM SERPL-SCNC: 140 MMOL/L (ref 135–145)
WBC # BLD AUTO: 7.4 K/UL (ref 4.8–10.8)

## 2017-06-07 PROCEDURE — A9579 GAD-BASE MR CONTRAST NOS,1ML: HCPCS | Performed by: HOSPITALIST

## 2017-06-07 PROCEDURE — 74183 MRI ABD W/O CNTR FLWD CNTR: CPT

## 2017-06-07 PROCEDURE — 82150 ASSAY OF AMYLASE: CPT

## 2017-06-07 PROCEDURE — 770020 HCHG ROOM/CARE - TELE (206)

## 2017-06-07 PROCEDURE — 700102 HCHG RX REV CODE 250 W/ 637 OVERRIDE(OP): Performed by: HOSPITALIST

## 2017-06-07 PROCEDURE — 85025 COMPLETE CBC W/AUTO DIFF WBC: CPT

## 2017-06-07 PROCEDURE — 83690 ASSAY OF LIPASE: CPT

## 2017-06-07 PROCEDURE — A9270 NON-COVERED ITEM OR SERVICE: HCPCS | Performed by: HOSPITALIST

## 2017-06-07 PROCEDURE — 700111 HCHG RX REV CODE 636 W/ 250 OVERRIDE (IP): Performed by: HOSPITALIST

## 2017-06-07 PROCEDURE — 700102 HCHG RX REV CODE 250 W/ 637 OVERRIDE(OP): Performed by: INTERNAL MEDICINE

## 2017-06-07 PROCEDURE — 700111 HCHG RX REV CODE 636 W/ 250 OVERRIDE (IP): Performed by: INTERNAL MEDICINE

## 2017-06-07 PROCEDURE — A9270 NON-COVERED ITEM OR SERVICE: HCPCS | Performed by: INTERNAL MEDICINE

## 2017-06-07 PROCEDURE — 80053 COMPREHEN METABOLIC PANEL: CPT

## 2017-06-07 PROCEDURE — 99232 SBSQ HOSP IP/OBS MODERATE 35: CPT | Performed by: HOSPITALIST

## 2017-06-07 PROCEDURE — 700101 HCHG RX REV CODE 250: Performed by: INTERNAL MEDICINE

## 2017-06-07 PROCEDURE — 700117 HCHG RX CONTRAST REV CODE 255: Performed by: HOSPITALIST

## 2017-06-07 RX ORDER — LORAZEPAM 2 MG/ML
1 INJECTION INTRAMUSCULAR ONCE
Status: COMPLETED | OUTPATIENT
Start: 2017-06-07 | End: 2017-06-07

## 2017-06-07 RX ORDER — HYDROMORPHONE HYDROCHLORIDE 2 MG/ML
2 INJECTION, SOLUTION INTRAMUSCULAR; INTRAVENOUS; SUBCUTANEOUS EVERY 4 HOURS PRN
Status: DISCONTINUED | OUTPATIENT
Start: 2017-06-07 | End: 2017-06-09

## 2017-06-07 RX ADMIN — OXYCODONE HYDROCHLORIDE 15 MG: 5 TABLET ORAL at 20:39

## 2017-06-07 RX ADMIN — ENOXAPARIN SODIUM 40 MG: 100 INJECTION SUBCUTANEOUS at 08:48

## 2017-06-07 RX ADMIN — HYDROMORPHONE HYDROCHLORIDE 2 MG: 2 INJECTION, SOLUTION INTRAMUSCULAR; INTRAVENOUS; SUBCUTANEOUS at 13:17

## 2017-06-07 RX ADMIN — PANCRELIPASE 12000 UNITS: 30000; 6000; 19000 CAPSULE, DELAYED RELEASE PELLETS ORAL at 17:39

## 2017-06-07 RX ADMIN — HYDROMORPHONE HYDROCHLORIDE 2 MG: 2 INJECTION, SOLUTION INTRAMUSCULAR; INTRAVENOUS; SUBCUTANEOUS at 23:44

## 2017-06-07 RX ADMIN — HYDROMORPHONE HYDROCHLORIDE 1 MG: 1 INJECTION, SOLUTION INTRAMUSCULAR; INTRAVENOUS; SUBCUTANEOUS at 10:03

## 2017-06-07 RX ADMIN — HYDROMORPHONE HYDROCHLORIDE 1 MG: 1 INJECTION, SOLUTION INTRAMUSCULAR; INTRAVENOUS; SUBCUTANEOUS at 00:56

## 2017-06-07 RX ADMIN — HYDROMORPHONE HYDROCHLORIDE 2 MG: 2 INJECTION, SOLUTION INTRAMUSCULAR; INTRAVENOUS; SUBCUTANEOUS at 17:42

## 2017-06-07 RX ADMIN — ONDANSETRON 4 MG: 2 INJECTION INTRAMUSCULAR; INTRAVENOUS at 04:52

## 2017-06-07 RX ADMIN — GADODIAMIDE 10 ML: 287 INJECTION INTRAVENOUS at 20:01

## 2017-06-07 RX ADMIN — PANCRELIPASE 6000 UNITS: 30000; 6000; 19000 CAPSULE, DELAYED RELEASE PELLETS ORAL at 12:23

## 2017-06-07 RX ADMIN — LORAZEPAM 1 MG: 2 INJECTION INTRAMUSCULAR; INTRAVENOUS at 18:28

## 2017-06-07 RX ADMIN — POTASSIUM CHLORIDE AND SODIUM CHLORIDE: 900; 150 INJECTION, SOLUTION INTRAVENOUS at 11:03

## 2017-06-07 RX ADMIN — HYDROMORPHONE HYDROCHLORIDE 1 MG: 1 INJECTION, SOLUTION INTRAMUSCULAR; INTRAVENOUS; SUBCUTANEOUS at 08:49

## 2017-06-07 RX ADMIN — ONDANSETRON 4 MG: 4 TABLET, ORALLY DISINTEGRATING ORAL at 08:49

## 2017-06-07 RX ADMIN — ONDANSETRON 4 MG: 2 INJECTION INTRAMUSCULAR; INTRAVENOUS at 16:28

## 2017-06-07 RX ADMIN — OXYCODONE HYDROCHLORIDE 15 MG: 5 TABLET ORAL at 16:35

## 2017-06-07 RX ADMIN — POTASSIUM CHLORIDE AND SODIUM CHLORIDE 1000 ML: 900; 150 INJECTION, SOLUTION INTRAVENOUS at 00:56

## 2017-06-07 RX ADMIN — HYDROMORPHONE HYDROCHLORIDE 1 MG: 1 INJECTION, SOLUTION INTRAMUSCULAR; INTRAVENOUS; SUBCUTANEOUS at 04:55

## 2017-06-07 ASSESSMENT — ENCOUNTER SYMPTOMS
SPUTUM PRODUCTION: 0
LOSS OF CONSCIOUSNESS: 0
PALPITATIONS: 0
FEVER: 0
SHORTNESS OF BREATH: 0
SENSORY CHANGE: 0
DEPRESSION: 0
NECK PAIN: 0
MYALGIAS: 0
VOMITING: 0
STRIDOR: 0
TINGLING: 0
DIARRHEA: 1
CHILLS: 0
NAUSEA: 1
TREMORS: 0
ABDOMINAL PAIN: 1
FALLS: 0
DIZZINESS: 0

## 2017-06-07 ASSESSMENT — PAIN SCALES - GENERAL
PAINLEVEL_OUTOF10: 6
PAINLEVEL_OUTOF10: 8

## 2017-06-07 NOTE — CARE PLAN
Problem: Knowledge Deficit  Goal: Knowledge of disease process/condition, treatment plan, diagnostic tests, and medications will improve  Talked with patient about pre medicating prior to contrast injection to protect patient from adverse allergic effects.      Problem: Pain Management  Goal: Pain level will decrease to patient’s comfort goal  Intervention: Follow pain managment plan developed in collaboration with patient and Interdisciplinary Team  Collaborated with MD and hospitalist RN to address patient reports that her pain is not controlled.  Patient verbalized new pain management goal.

## 2017-06-07 NOTE — DIETARY
Nutrition services follow-up.  Day 4 for NPO/or clear liquid status. 62 yr old female with pancreatitis.    Diet order: 6/6 Clear liquid.  RD to monitor diet order progression

## 2017-06-07 NOTE — PROGRESS NOTES
Pt left for her HIDA scan about 1600 and returned about 1745.  Medicated her for her pain and nausea as she has not had anything since 0930.  HIDA scan results up and called Dr. Lowry; diet started with clears and to advance to GI Soft.  Clear liquids given to the pt and she called her  with the results.

## 2017-06-07 NOTE — PROGRESS NOTES
[facility] Hospitalist Progress Note    Date of Service: 2017    Chief Complaint  62 y.o. female admitted 6/3/2017 with abdominal pain with N/V.    Interval Problem Update  Pt with hx of chronic pancreatitis, now with acute attack.      Pain in  Luq, sharp, intermittent, no radiation, intensity 5/10      Unable to eat much by mouth    Us ruq shows some pericholecystic fluid.HIDA scan WNL    Unable to get CT scan of abdomen due to patients worry about iodine allergy    Patient requests gi consult    Discussed plan and pt condition with RN at bedside and social work.    Consultants/Specialty  Gi dr guan consulted today    Disposition  Home when stable        Review of Systems   Constitutional: Negative for fever, chills and malaise/fatigue.   HENT: Negative for congestion.    Respiratory: Negative for sputum production, shortness of breath and stridor.    Cardiovascular: Negative for chest pain and palpitations.   Gastrointestinal: Positive for nausea, abdominal pain and diarrhea. Negative for vomiting.   Genitourinary: Negative for dysuria, urgency and frequency.   Musculoskeletal: Negative for myalgias, falls and neck pain.   Neurological: Negative for dizziness, tingling, tremors, sensory change and loss of consciousness.   Psychiatric/Behavioral: Negative for depression and suicidal ideas.   All other systems reviewed and are negative.     Physical Exam  Laboratory/Imaging   Hemodynamics  Temp (24hrs), Av.1 °C (98.8 °F), Min:36.9 °C (98.4 °F), Max:37.6 °C (99.7 °F)   Temperature: 37.2 °C (98.9 °F)  Pulse  Av.4  Min: 50  Max: 120    Blood Pressure: 129/75 mmHg      Respiratory      Respiration: 18, Pulse Oximetry: 94 %        RUL Breath Sounds: Clear, RML Breath Sounds: Clear, RLL Breath Sounds: Clear;Diminished, RODY Breath Sounds: Expiratory Wheezes, LLL Breath Sounds: Expiratory Wheezes    Fluids    Intake/Output Summary (Last 24 hours) at 17 1120  Last data filed at 17 1800   Gross per 24  hour   Intake   1100 ml   Output      0 ml   Net   1100 ml       Nutrition  Orders Placed This Encounter   Procedures   • DIET ORDER     Standing Status: Standing      Number of Occurrences: 1      Standing Expiration Date:      Order Specific Question:  Diet:     Answer:  Clear Liquid [10]     Physical Exam   Constitutional: She is oriented to person, place, and time. No distress.   HENT:   Head: Normocephalic and atraumatic.   Mouth/Throat: No oropharyngeal exudate.   Eyes: Right eye exhibits no discharge. Left eye exhibits no discharge. No scleral icterus.   Neck: Neck supple.   Cardiovascular: Normal rate and regular rhythm.    No murmur heard.  Pulmonary/Chest: Effort normal and breath sounds normal. No stridor. No respiratory distress. She has no wheezes. She exhibits no tenderness.   Abdominal: Soft. Bowel sounds are normal. There is tenderness (luq).   Musculoskeletal: She exhibits no edema or tenderness.   Lymphadenopathy:     She has no cervical adenopathy.   Neurological: She is alert and oriented to person, place, and time.   Skin: Skin is warm and dry. She is not diaphoretic. No erythema.   Psychiatric: She has a normal mood and affect. Her behavior is normal. Judgment normal.   Nursing note and vitals reviewed.      Recent Labs      06/07/17   0419   WBC  7.4   RBC  3.93*   HEMOGLOBIN  11.5*   HEMATOCRIT  33.7*   MCV  85.8   MCH  29.3   MCHC  34.1   RDW  40.4   PLATELETCT  243   MPV  9.5     Recent Labs      06/05/17   0819  06/06/17   1313  06/07/17   0419   SODIUM  137  137  140   POTASSIUM  3.1*  4.0  4.8   CHLORIDE  109  107  112   CO2  21  17*  23   GLUCOSE  122*  71  167*   BUN  <5*  11  8   CREATININE  0.65  0.68  0.65   CALCIUM  7.9*  8.1*  7.8*                      Assessment/Plan     Acute on chronic pancreatitis (CMS-HCC) (present on admission)  Assessment & Plan  Clear liquid diet      Gi eval  Added pancreatic enzymes today with each meal  - continue symptomatic care    Hypokalemia  (present on admission)  Assessment & Plan  - resolved    Normocytic anemia (present on admission)  Assessment & Plan  - no sign of gross bleeding  - repeat cbc in am    Hyperchloremic acidosis (present on admission)  Assessment & Plan  hydrate      Labs reviewed, Medications reviewed and Radiology images reviewed  Lyn catheter: No Lyn      DVT Prophylaxis: Enoxaparin (Lovenox)              Check am cbc, cmp, amylase, lipase

## 2017-06-07 NOTE — PROGRESS NOTES
Patient educated regarding CT and plan of care.  GI consulted for confirmation of need for contrast injection to view pancreas.  Patient has significant concern regarding past code blue reaction to contrast.  MD informed patient of premedication with steroids and benadryl prior to contrast injection.  Patient requested second opinion with GI.  Pain medication increased due to pain goal not being met see MAR.

## 2017-06-07 NOTE — PROGRESS NOTES
Called down to MRI doubled checked that contrast media did not include iodine IVP contrast due to past patient history of allergic reaction.  Called Dr. Murphy for Ativan order due to anxiety and claustrophobia.  Patient upset that pain medication was not available yet.  I explained that pain medication would not be available until 4 pm IV but she could try oral pain medication with pre-medication with anti emetic.  Patient refused pain this plan.  Will give ativan prior to transport to MRI

## 2017-06-08 LAB
BACTERIA BLD CULT: NORMAL
BACTERIA BLD CULT: NORMAL
SIGNIFICANT IND 70042: NORMAL
SIGNIFICANT IND 70042: NORMAL
SITE SITE: NORMAL
SITE SITE: NORMAL
SOURCE SOURCE: NORMAL
SOURCE SOURCE: NORMAL

## 2017-06-08 PROCEDURE — 99232 SBSQ HOSP IP/OBS MODERATE 35: CPT | Performed by: HOSPITALIST

## 2017-06-08 PROCEDURE — 700111 HCHG RX REV CODE 636 W/ 250 OVERRIDE (IP): Performed by: INTERNAL MEDICINE

## 2017-06-08 PROCEDURE — 770020 HCHG ROOM/CARE - TELE (206)

## 2017-06-08 PROCEDURE — A9270 NON-COVERED ITEM OR SERVICE: HCPCS | Performed by: HOSPITALIST

## 2017-06-08 PROCEDURE — A9270 NON-COVERED ITEM OR SERVICE: HCPCS | Performed by: INTERNAL MEDICINE

## 2017-06-08 PROCEDURE — 700101 HCHG RX REV CODE 250: Performed by: INTERNAL MEDICINE

## 2017-06-08 PROCEDURE — 700102 HCHG RX REV CODE 250 W/ 637 OVERRIDE(OP): Performed by: INTERNAL MEDICINE

## 2017-06-08 PROCEDURE — 99406 BEHAV CHNG SMOKING 3-10 MIN: CPT

## 2017-06-08 PROCEDURE — 700102 HCHG RX REV CODE 250 W/ 637 OVERRIDE(OP): Performed by: HOSPITALIST

## 2017-06-08 PROCEDURE — 700101 HCHG RX REV CODE 250: Performed by: HOSPITALIST

## 2017-06-08 PROCEDURE — 700111 HCHG RX REV CODE 636 W/ 250 OVERRIDE (IP): Performed by: HOSPITALIST

## 2017-06-08 RX ORDER — OXYCODONE HCL 10 MG/1
10 TABLET, FILM COATED, EXTENDED RELEASE ORAL EVERY 12 HOURS
Status: DISCONTINUED | OUTPATIENT
Start: 2017-06-08 | End: 2017-06-09 | Stop reason: HOSPADM

## 2017-06-08 RX ADMIN — HYDROMORPHONE HYDROCHLORIDE 2 MG: 2 INJECTION, SOLUTION INTRAMUSCULAR; INTRAVENOUS; SUBCUTANEOUS at 09:15

## 2017-06-08 RX ADMIN — HYDROMORPHONE HYDROCHLORIDE 2 MG: 2 INJECTION, SOLUTION INTRAMUSCULAR; INTRAVENOUS; SUBCUTANEOUS at 03:51

## 2017-06-08 RX ADMIN — OXYCODONE HYDROCHLORIDE 10 MG: 10 TABLET, FILM COATED, EXTENDED RELEASE ORAL at 20:44

## 2017-06-08 RX ADMIN — ONDANSETRON 4 MG: 2 INJECTION INTRAMUSCULAR; INTRAVENOUS at 13:09

## 2017-06-08 RX ADMIN — PROCHLORPERAZINE EDISYLATE 10 MG: 5 INJECTION INTRAMUSCULAR; INTRAVENOUS at 20:45

## 2017-06-08 RX ADMIN — ONDANSETRON 4 MG: 2 INJECTION INTRAMUSCULAR; INTRAVENOUS at 17:23

## 2017-06-08 RX ADMIN — ASPIRIN 81 MG CHEWABLE TABLET 81 MG: 81 TABLET CHEWABLE at 07:47

## 2017-06-08 RX ADMIN — OMEPRAZOLE 20 MG: 20 CAPSULE, DELAYED RELEASE ORAL at 07:47

## 2017-06-08 RX ADMIN — HYDROMORPHONE HYDROCHLORIDE 2 MG: 2 INJECTION, SOLUTION INTRAMUSCULAR; INTRAVENOUS; SUBCUTANEOUS at 17:26

## 2017-06-08 RX ADMIN — ENOXAPARIN SODIUM 40 MG: 100 INJECTION SUBCUTANEOUS at 07:47

## 2017-06-08 RX ADMIN — PANCRELIPASE 12000 UNITS: 30000; 6000; 19000 CAPSULE, DELAYED RELEASE PELLETS ORAL at 11:22

## 2017-06-08 RX ADMIN — OXYCODONE HYDROCHLORIDE 10 MG: 10 TABLET, FILM COATED, EXTENDED RELEASE ORAL at 09:14

## 2017-06-08 RX ADMIN — ONDANSETRON 4 MG: 2 INJECTION INTRAMUSCULAR; INTRAVENOUS at 03:59

## 2017-06-08 RX ADMIN — OXYCODONE HYDROCHLORIDE 15 MG: 5 TABLET ORAL at 07:55

## 2017-06-08 RX ADMIN — PANCRELIPASE 12000 UNITS: 30000; 6000; 19000 CAPSULE, DELAYED RELEASE PELLETS ORAL at 07:47

## 2017-06-08 RX ADMIN — OXYCODONE HYDROCHLORIDE 15 MG: 5 TABLET ORAL at 12:02

## 2017-06-08 RX ADMIN — OXYCODONE HYDROCHLORIDE 15 MG: 5 TABLET ORAL at 16:15

## 2017-06-08 RX ADMIN — POTASSIUM CHLORIDE AND SODIUM CHLORIDE: 900; 150 INJECTION, SOLUTION INTRAVENOUS at 20:52

## 2017-06-08 RX ADMIN — OXYCODONE HYDROCHLORIDE 15 MG: 5 TABLET ORAL at 03:23

## 2017-06-08 RX ADMIN — HYDROMORPHONE HYDROCHLORIDE 2 MG: 2 INJECTION, SOLUTION INTRAMUSCULAR; INTRAVENOUS; SUBCUTANEOUS at 13:13

## 2017-06-08 RX ADMIN — PANCRELIPASE 12000 UNITS: 30000; 6000; 19000 CAPSULE, DELAYED RELEASE PELLETS ORAL at 17:20

## 2017-06-08 RX ADMIN — POTASSIUM CHLORIDE AND SODIUM CHLORIDE: 900; 150 INJECTION, SOLUTION INTRAVENOUS at 07:04

## 2017-06-08 ASSESSMENT — ENCOUNTER SYMPTOMS
SENSORY CHANGE: 0
NECK PAIN: 0
MYALGIAS: 0
CONSTITUTIONAL NEGATIVE: 1
DEPRESSION: 0
ABDOMINAL PAIN: 1
PALPITATIONS: 0
DIZZINESS: 0
STRIDOR: 0
VOMITING: 0
SHORTNESS OF BREATH: 0
DIARRHEA: 1
SPUTUM PRODUCTION: 0
FEVER: 0
LOSS OF CONSCIOUSNESS: 0
NAUSEA: 1
FALLS: 0
TREMORS: 0
TINGLING: 0
CHILLS: 0

## 2017-06-08 ASSESSMENT — PAIN SCALES - GENERAL
PAINLEVEL_OUTOF10: 8
PAINLEVEL_OUTOF10: 7
PAINLEVEL_OUTOF10: 8

## 2017-06-08 NOTE — PROGRESS NOTES
Assessment completed--see doc flowsheet. A&Ox4. Meds provided. Medicated for 8/10 LUQ pain--see MAR. POC discussed, verbalized understanding. Call light and personal possessions within reach, bed in low position, family at bedside, encouraged to call for assistance.

## 2017-06-08 NOTE — PROGRESS NOTES
Gastroenterology Progress Note     Author: David Myers   Date & Time Created: 2017 12:06 PM    Interval History:  MRCP without acute findings, no pancreatic inflammation.    Review of Systems:  Review of Systems   Constitutional: Negative.    Gastrointestinal:        Reports some improvement in LUQ pain.  Tolerating clear liquids without difficulty.  No BM       Physical Exam:  Physical Exam   Cardiovascular: Normal rate.    Pulmonary/Chest: Effort normal.   Abdominal: Soft.       Labs:        Invalid input(s): [id number]      Recent Labs      17   1313  17   0419   SODIUM  137  140   POTASSIUM  4.0  4.8   CHLORIDE  107  112   CO2  17*  23   BUN  11  8   CREATININE  0.68  0.65   CALCIUM  8.1*  7.8*     Recent Labs      17   1313  17   0419   ALTSGPT   --   8   ASTSGOT   --   13   ALKPHOSPHAT   --   52   TBILIRUBIN   --   0.3   AMYLASE   --   142*   LIPASE   --   13   GLUCOSE  71  167*     Recent Labs      17   0419   RBC  3.93*   HEMOGLOBIN  11.5*   HEMATOCRIT  33.7*   PLATELETCT  243     Recent Labs      17   0419   WBC  7.4   NEUTSPOLYS  71.30   LYMPHOCYTES  19.10*   MONOCYTES  7.70   EOSINOPHILS  1.20   BASOPHILS  0.40   ASTSGOT  13   ALTSGPT  8   ALKPHOSPHAT  52   TBILIRUBIN  0.3     Hemodynamics:  Temp (24hrs), Av °C (98.6 °F), Min:36.5 °C (97.7 °F), Max:37.4 °C (99.3 °F)  Temperature: 37.4 °C (99.3 °F)  Pulse  Av.3  Min: 50  Max: 120   Blood Pressure: 133/67 mmHg     Respiratory:    Respiration: 18, Pulse Oximetry: 91 %        RUL Breath Sounds: Clear, RML Breath Sounds: Diminished, RLL Breath Sounds: Diminished, RODY Breath Sounds: Diminished, LLL Breath Sounds: Diminished  Fluids:    Intake/Output Summary (Last 24 hours) at 17 1206  Last data filed at 17 0700   Gross per 24 hour   Intake    340 ml   Output      0 ml   Net    340 ml        GI/Nutrition:  Orders Placed This Encounter   Procedures   • DIET ORDER     Standing Status: Standing       Number of Occurrences: 1      Standing Expiration Date:      Order Specific Question:  Diet:     Answer:  Full Liquid [11]     Medical Decision Making, by Problem:  Active Hospital Problems    Diagnosis   • Acute on chronic pancreatitis (CMS-HCC) [K85.90, K86.1]   • Normocytic anemia [D64.9]   • Hyperchloremic acidosis [E87.2]   • Hypokalemia [E87.6]       Plan:  Advance to full liquids.  Continue with analgesics PRN.  If patient tolerates diet and pancreatic enzymes do not worsen in AM, then consider for d/c home with f/u with Dr. Gloria as outpatient.    Core Measures

## 2017-06-08 NOTE — CONSULTS
DATE OF SERVICE:  06/07/2017    DATE OF CONSULTATION:  06/07/2017      REQUESTING PROVIDER:  Mikhail Lowry MD      REASON FOR CONSULTATION:  Abdominal pain.      HISTORY OF PRESENT ILLNESS:  This 62-year-old female was admitted to the   hospital for evaluation and management of abdominal pain.  The patient has a   history of chronic tobacco use, pancreatic cyst, COPD, and chronic back pain.    In regards to the pancreatic cyst, this was discovered sometime last year.    Imaging by CT and MRI showed a 14 mm cyst in the head of the pancreas which   appears to be thinly septated.  She then underwent EUS with FNA by Dr. Gloria.  EUS showed communication with the pancreatic duct; however, the   pancreatic duct was not dilated.  Amylase and CEA levels were both low.    Surveillance imaging has been performed.  Her last CT scan with contrast was   in September.  The results of this are unavailable.  She did have a   noncontrast CT scan in December, which did not clearly show any worsening of   the pancreatic cyst.  Of note, the patient does not appear to have chronic   pancreatitis as documented in her chart.      The patient describes a 2-week period of upper abdominal pain.  The pain is   located in the left upper quadrant.  It feels like grabbing or crawling   sensation.  The pain does not radiate.  It is present throughout the day,   although comes in waves.  The pain is somewhat worse after eating.  The pain   is associated with nausea and vomiting.  Emesis is frothy and liquidy.  She   denies hematemesis.  Her bowels during this time have been fairly regular and   have not changed.  She has had subjective fevers and chills, but has not taken   her temperature.  Of note, she does describe about a 10-15 pound weight loss   in the preceding 4 months.       PAST MEDICAL HISTORY:    1.  Chronic tobacco use, quit approximately 2-3 months ago.    2.  COPD.    3.  Pancreatic cyst as above.    4.  Chronic low back  pain.    5.  Neuropathy.    6.  History of colon cancer, status post colon resection -- question benign   process based on patient's description; however, details are otherwise   unknown.    7.  History of DVT.    8.  Anxiety.      PAST SURGICAL HISTORY:    1.  Partial colectomy.    2.  Hysterectomy.    3.  Tonsillectomy.    4.  .    5.  Cataract surgery.    6.  Colonoscopy approximately 1-2 years ago at [facility] which was apparently   normal.      ALLERGIES:  TO IODINE AND TAPE.      MEDICATIONS PRIOR TO ADMISSION:  Aspirin and oxycodone.      SOCIAL HISTORY:  Patient drinks rarely.  She does not use any drugs.  She is a   former lifelong smoker, quit about 2-3 months ago.       REVIEW OF SYSTEMS:  Positive for fevers and chills.  Positive for 15-pound   weight loss.  Positive for abdominal pain, nausea, vomiting.  No chest pain,   shortness of breath, dysphagia, odynophagia, hematemesis, melena,   hematochezia, dysuria, hematuria, or lower extremity edema.      PHYSICAL EXAMINATION:    GENERAL:  This is a pleasant female who is in no distress.    HEENT:  Her pupils are equal, round.  Her extraocular movements are intact.    NECK:  Supple, no adenopathy.    CHEST:  Clear to auscultation.    HEART:  Regular rate and rhythm.    ABDOMEN:  Soft.  She does have tenderness in left upper quadrant and along the   left lower ribs.  No palpable mass.  Normal bowel sounds.  No rebound.    BACK:  She has tenderness along the left lower rib cage.    EXTREMITIES:  Warm, dry without edema.      LABORATORY DATA:  Sodium 140, potassium 4.8, BUN and creatinine of 8 and 0.65.    Liver tests which are normal except for an albumin of 2.7, lipase of 13 on   admission, amylase of 142.  UA which is negative.      IMPRESSION:  A 62-year-old female with chronic obstructive pulmonary disease   and chronic tobacco use as well as a history of pancreatic cyst who presents   with left upper quadrant pain.    1.  Left upper quadrant pain.   The etiology of her pain is not readily   apparent.  Certainly, this may be pancreatic disease, but there is no evidence   of acute pancreatitis.  There is no evidence of chronic pancreatitis.  The   pancreatic cyst is located in the head of the pancreas, but could   theoretically be causing pain in the left upper quadrant if there is a   component of ductal obstruction.  The patient is also very tender along her   thoracic spine and along the left lower ribs raising the possibility of   referred back pain.  Of course, the weight loss that she describes is   concerning and would not be explained by referred back pain.  Although the   patient is a chronic smoker, there does not appear to be any suggestion of   chronic mesenteric ischemia.      At this point, imaging of the pancreas would be recommended.  Discussed the   options of imaging with her.  Given her CONTRAST ALLERGY and repeated CT   scans, an MRI will be ordered.  MRI with gadolinium will be used to assess the   pancreatic cyst to see if it is any larger.  This should also give us any   evidence of whether or not the pancreatic duct might be dilated.  If this is   negative, then further evaluation of the pancreatic cyst can be deferred.    Treatment of her pain with oral pain medications, and perhaps musculoskeletal   medications such as NSAIDs and muscle relaxers could be entertained.  Back   imaging might then be reasonable.      PLAN:    1.  Pancreatic cyst as above.  The pancreatic MRI will be obtained.  She will   otherwise continue her outpatient surveillance if there is no evidence of   pancreatic cyst enlargement compared to prior exams.    2.  Chronic obstructive pulmonary disease.    3.  Chronic tobacco use, which she quit 2-3 months ago.    4.  Chronic low back pain.    5.  A 15-pound weight loss.  As above, pancreatic MRI will be obtained _____   should patient has had a colonoscopy recently.  She has also had an upper   endoscopy.  Her weight  likely can be followed and if further weight loss   continues, then additional evaluation will clearly be warranted.       ____________________________________     MD HOLDEN CARABALLO / ANGEL    DD:  06/07/2017 14:32:31  DT:  06/07/2017 19:27:54    D#:  [id number]  Job#:  [id number]

## 2017-06-08 NOTE — DISCHARGE PLANNING
Medical Social Work    Referral: Pt discussed at IDT rounds this AM.    Intervention: Per flowsheet, pt lives with spouse and expects to d.c home.  No SS needs identified nor any requests for HOLA Herron during rounds.      Plan: SW available for any assistance with d.c planning.

## 2017-06-08 NOTE — RESPIRATORY CARE
" COPD EDUCATION by COPD CLINICAL EDUCATOR  6/8/2017 at 11:30 AM by Leatha Taylor     Patient reviewed by COPD education team. Patient does not qualify for COPD program. Denies COPD.    Smoking Cessation Intervention 3 -10 minutes completed.     Provided smoking cessation packet with \"Tips to Quit\" and flyer for \"Free Smoking Cessation Classes\". Provided \"Quit Card\" with the [facility] for free nicotine replacement therapy.   Provided coupon for [employer].    Patient quit smoking 4/2017.  "

## 2017-06-08 NOTE — CARE PLAN
Problem: Pain Management  Goal: Pain level will decrease to patient’s comfort goal  Outcome: PROGRESSING AS EXPECTED  Pt encouraged to verbalize experiencing pain. 0-10 pain scale explained, verbalized understanding. Administer pain meds as ordered.    Problem: Psychosocial Needs:  Goal: Level of anxiety will decrease  Outcome: PROGRESSING AS EXPECTED  One-on-one discussion. Identify triggers for anxiety. Encourage pt to verbalize feelings of anxiety. Educated re: non-pharm methods of anxiety reduction. Administer medications prn as ordered.

## 2017-06-09 VITALS
SYSTOLIC BLOOD PRESSURE: 119 MMHG | HEART RATE: 52 BPM | DIASTOLIC BLOOD PRESSURE: 73 MMHG | OXYGEN SATURATION: 96 % | BODY MASS INDEX: 18.53 KG/M2 | HEIGHT: 59 IN | TEMPERATURE: 98.9 F | RESPIRATION RATE: 18 BRPM | WEIGHT: 91.93 LBS

## 2017-06-09 PROBLEM — E87.6 HYPOKALEMIA: Status: RESOLVED | Noted: 2017-06-03 | Resolved: 2017-06-09

## 2017-06-09 PROBLEM — K86.1 ACUTE ON CHRONIC PANCREATITIS (HCC): Status: RESOLVED | Noted: 2017-06-04 | Resolved: 2017-06-09

## 2017-06-09 PROBLEM — K85.90 ACUTE ON CHRONIC PANCREATITIS (HCC): Status: RESOLVED | Noted: 2017-06-04 | Resolved: 2017-06-09

## 2017-06-09 PROBLEM — D64.9 NORMOCYTIC ANEMIA: Status: RESOLVED | Noted: 2017-06-04 | Resolved: 2017-06-09

## 2017-06-09 PROBLEM — E87.29 HYPERCHLOREMIC ACIDOSIS: Status: RESOLVED | Noted: 2017-06-04 | Resolved: 2017-06-09

## 2017-06-09 LAB
AMYLASE SERPL-CCNC: 63 U/L (ref 25–125)
LIPASE SERPL-CCNC: 11 U/L (ref 7–58)

## 2017-06-09 PROCEDURE — A9270 NON-COVERED ITEM OR SERVICE: HCPCS | Performed by: INTERNAL MEDICINE

## 2017-06-09 PROCEDURE — A9270 NON-COVERED ITEM OR SERVICE: HCPCS | Performed by: HOSPITALIST

## 2017-06-09 PROCEDURE — 700102 HCHG RX REV CODE 250 W/ 637 OVERRIDE(OP): Performed by: INTERNAL MEDICINE

## 2017-06-09 PROCEDURE — 83690 ASSAY OF LIPASE: CPT

## 2017-06-09 PROCEDURE — 700102 HCHG RX REV CODE 250 W/ 637 OVERRIDE(OP): Performed by: HOSPITALIST

## 2017-06-09 PROCEDURE — 99239 HOSP IP/OBS DSCHRG MGMT >30: CPT | Performed by: HOSPITALIST

## 2017-06-09 PROCEDURE — 82150 ASSAY OF AMYLASE: CPT

## 2017-06-09 PROCEDURE — 700111 HCHG RX REV CODE 636 W/ 250 OVERRIDE (IP): Performed by: INTERNAL MEDICINE

## 2017-06-09 RX ORDER — ONDANSETRON 4 MG/1
4 TABLET, ORALLY DISINTEGRATING ORAL EVERY 4 HOURS PRN
Qty: 20 TAB | Refills: 0 | Status: SHIPPED | OUTPATIENT
Start: 2017-06-09 | End: 2018-07-22

## 2017-06-09 RX ORDER — OXYCODONE HYDROCHLORIDE 15 MG/1
15 TABLET ORAL EVERY 6 HOURS PRN
Qty: 40 TAB | Refills: 0 | Status: SHIPPED | OUTPATIENT
Start: 2017-06-09 | End: 2018-07-22

## 2017-06-09 RX ORDER — OMEPRAZOLE 20 MG/1
20 CAPSULE, DELAYED RELEASE ORAL DAILY
Qty: 30 CAP | Refills: 3 | Status: SHIPPED | OUTPATIENT
Start: 2017-06-09 | End: 2018-07-22

## 2017-06-09 RX ORDER — OXYCODONE HCL 10 MG/1
10 TABLET, FILM COATED, EXTENDED RELEASE ORAL EVERY 12 HOURS
Qty: 60 EACH | Refills: 0 | Status: SHIPPED | OUTPATIENT
Start: 2017-06-09 | End: 2018-07-22

## 2017-06-09 RX ADMIN — OMEPRAZOLE 20 MG: 20 CAPSULE, DELAYED RELEASE ORAL at 07:42

## 2017-06-09 RX ADMIN — PANCRELIPASE 12000 UNITS: 30000; 6000; 19000 CAPSULE, DELAYED RELEASE PELLETS ORAL at 11:50

## 2017-06-09 RX ADMIN — OXYCODONE HYDROCHLORIDE 10 MG: 10 TABLET, FILM COATED, EXTENDED RELEASE ORAL at 07:42

## 2017-06-09 RX ADMIN — ONDANSETRON 4 MG: 4 TABLET, ORALLY DISINTEGRATING ORAL at 07:51

## 2017-06-09 RX ADMIN — PANCRELIPASE 12000 UNITS: 30000; 6000; 19000 CAPSULE, DELAYED RELEASE PELLETS ORAL at 07:42

## 2017-06-09 RX ADMIN — ONDANSETRON 4 MG: 4 TABLET, ORALLY DISINTEGRATING ORAL at 12:46

## 2017-06-09 RX ADMIN — OXYCODONE HYDROCHLORIDE 15 MG: 5 TABLET ORAL at 14:53

## 2017-06-09 RX ADMIN — ENOXAPARIN SODIUM 40 MG: 100 INJECTION SUBCUTANEOUS at 07:42

## 2017-06-09 RX ADMIN — ASPIRIN 81 MG CHEWABLE TABLET 81 MG: 81 TABLET CHEWABLE at 07:42

## 2017-06-09 RX ADMIN — OXYCODONE HYDROCHLORIDE 15 MG: 5 TABLET ORAL at 10:36

## 2017-06-09 RX ADMIN — OXYCODONE HYDROCHLORIDE 15 MG: 5 TABLET ORAL at 06:13

## 2017-06-09 ASSESSMENT — PAIN SCALES - GENERAL
PAINLEVEL_OUTOF10: 7

## 2017-06-09 NOTE — PROGRESS NOTES
Report given to NOC nurse, Cecilia DARNELL. Pt remains calm and cooperative. All lines remain patent. Safety precautions remain in place. POC discussed with pt and RN at the bedside.

## 2017-06-09 NOTE — PROGRESS NOTES
Bedside report received from [facility] nurseCecilia RN. Assumed care. Pt resting in bed. Safety precautions in place.

## 2017-06-09 NOTE — PROGRESS NOTES
Discharge instructions and prescriptions explained & provided to patient. Verbalized understanding. IV removed, tip intact. Pt discharged to home with all personal belongings via wheelchair.     Tele note:  Sinus rhythm/Sinus ralph (down to 43), no ectopy  .16/ .08/ .44

## 2017-06-09 NOTE — DISCHARGE INSTRUCTIONS
Discharge Instructions    Discharged to home by car with relative. Discharged via wheelchair, hospital escort: Yes.  Special equipment needed: Not Applicable    Be sure to schedule a follow-up appointment with your primary care doctor or any specialists as instructed.     Discharge Plan:   Influenza Vaccine Indication: Patient Refuses    I understand that a diet low in cholesterol, fat, and sodium is recommended for good health. Unless I have been given specific instructions below for another diet, I accept this instruction as my diet prescription.   Other diet: full liquids until follow up with GI    Special Instructions: None    · Is patient discharged on Warfarin / Coumadin?   No     · Is patient Post Blood Transfusion?  No  Nausea and Vomiting  Nausea is a sick feeling that often comes before throwing up (vomiting). Vomiting is a reflex where stomach contents come out of your mouth. Vomiting can cause severe loss of body fluids (dehydration). Children and elderly adults can become dehydrated quickly, especially if they also have diarrhea. Nausea and vomiting are symptoms of a condition or disease. It is important to find the cause of your symptoms.  CAUSES   · Direct irritation of the stomach lining. This irritation can result from increased acid production (gastroesophageal reflux disease), infection, food poisoning, taking certain medicines (such as nonsteroidal anti-inflammatory drugs), alcohol use, or tobacco use.  · Signals from the brain. These signals could be caused by a headache, heat exposure, an inner ear disturbance, increased pressure in the brain from injury, infection, a tumor, or a concussion, pain, emotional stimulus, or metabolic problems.  · An obstruction in the gastrointestinal tract (bowel obstruction).  · Illnesses such as diabetes, hepatitis, gallbladder problems, appendicitis, kidney problems, cancer, sepsis, atypical symptoms of a heart attack, or eating disorders.  · Medical treatments  such as chemotherapy and radiation.  · Receiving medicine that makes you sleep (general anesthetic) during surgery.  DIAGNOSIS  Your caregiver may ask for tests to be done if the problems do not improve after a few days. Tests may also be done if symptoms are severe or if the reason for the nausea and vomiting is not clear. Tests may include:  · Urine tests.  · Blood tests.  · Stool tests.  · Cultures (to look for evidence of infection).  · X-rays or other imaging studies.  Test results can help your caregiver make decisions about treatment or the need for additional tests.  TREATMENT  You need to stay well hydrated. Drink frequently but in small amounts. You may wish to drink water, sports drinks, clear broth, or eat frozen ice pops or gelatin dessert to help stay hydrated. When you eat, eating slowly may help prevent nausea. There are also some antinausea medicines that may help prevent nausea.  HOME CARE INSTRUCTIONS   · Take all medicine as directed by your caregiver.  · If you do not have an appetite, do not force yourself to eat. However, you must continue to drink fluids.  · If you have an appetite, eat a normal diet unless your caregiver tells you differently.  1. Eat a variety of complex carbohydrates (rice, wheat, potatoes, bread), lean meats, yogurt, fruits, and vegetables.  2. Avoid high-fat foods because they are more difficult to digest.  · Drink enough water and fluids to keep your urine clear or pale yellow.  · If you are dehydrated, ask your caregiver for specific rehydration instructions. Signs of dehydration may include:  1. Severe thirst.  2. Dry lips and mouth.  3. Dizziness.  4. Dark urine.  5. Decreasing urine frequency and amount.  6. Confusion.  7. Rapid breathing or pulse.  SEEK IMMEDIATE MEDICAL CARE IF:   · You have blood or brown flecks (like coffee grounds) in your vomit.  · You have black or bloody stools.  · You have a severe headache or stiff neck.  · You are confused.  · You have  severe abdominal pain.  · You have chest pain or trouble breathing.  · You do not urinate at least once every 8 hours.  · You develop cold or clammy skin.  · You continue to vomit for longer than 24 to 48 hours.  · You have a fever.  MAKE SURE YOU:   · Understand these instructions.  · Will watch your condition.  · Will get help right away if you are not doing well or get worse.     This information is not intended to replace advice given to you by your health care provider. Make sure you discuss any questions you have with your health care provider.     Document Released: 12/18/2006 Document Revised: 03/11/2013 Document Reviewed: 05/16/2012  [employer] Interactive Patient Education ©2016 [employer]  Hypokalemia  Hypokalemia means a low potassium level in the blood. Symptoms may include muscle weakness and cramping, fatigue, abdominal pain, vomiting, constipation, or irregularities of the heartbeat. Sometimes hypokalemia is discovered by your caregiver if you are taking certain medicines for high blood pressure or kidney disease.   Potassium is an electrolyte that helps regulate the amount of fluid in the body. It also stimulates muscle contraction and maintains a stable acid-base balance. If potassium levels go too low or too high, your health may be in danger. You are at risk for developing shock, heart, and lung problems. Hypokalemia can occur if you have excessive diarrhea, vomiting, or sweating. Potassium can be lost through your kidneys in the urine. Certain common medicines can also cause potassium loss, especially water pills (diuretics). The same is possible with cortisone medications or certain types of antibiotics. Low potassium can be dangerous if you are taking certain heart medicines. In diabetes, your potassium may fall after you take insulin, especially if your diabetes had been out of control for a while. In rare cases, potassium may be low because you are not getting enough in your diet.   In  adults, a potassium level below 3.5 mEq/L is usually considered low.  Hypokalemia can be treated with potassium supplements taken by mouth and a diet that is high in potassium. Foods with high potassium content are:  · Peas, lentils, lima beans, nuts, and dried fruit.   · Whole grain and bran cereals and breads.   · Fresh fruit and vegetables. Examples include:   · Bananas.   · Cantaloupe.   · Grapefruit.   · Oranges.   · Tomatoes.   · Honeydew melons.   · Potatoes.   · Peaches.   · Orange and tomato juices.   · Meats.   See your caregiver as instructed for a follow-up blood test to be sure your potassium is back to normal.  SEEK MEDICAL CARE IF:   · You have nausea, vomiting, constipation, or abdominal pain.   · You have palpitations or irregular heartbeats, chest pain or shortness of breath.   · You have muscle cramps or weakness or fatigue.   · You have lethargy.   SEEK IMMEDIATE MEDICAL CARE IF:   · You have paralysis.   · You have confusion or other mental status changes.   Document Released: 12/18/2006 Document Revised: 03/11/2013 Document Reviewed: 04/13/2011  Web[employer]® Patient Information ©2013 [employer].  Acute Pancreatitis  Acute pancreatitis is a disease in which the pancreas becomes suddenly irritated (inflamed). The pancreas is a large gland behind your stomach. The pancreas makes enzymes that help digest food. The pancreas also makes 2 hormones that help control your blood sugar. Acute pancreatitis happens when the enzymes attack and damage the pancreas. Most attacks last a couple of days and can cause serious problems.  HOME CARE  · Follow your doctor's diet instructions. You may need to avoid alcohol and limit fat in your diet.  · Eat small meals often.  · Drink enough fluids to keep your pee (urine) clear or pale yellow.  · Only take medicines as told by your doctor.  · Avoid drinking alcohol if it caused your disease.  · Do not smoke.  · Get plenty of rest.  · Check your blood sugar at home as  told by your doctor.  · Keep all doctor visits as told.  GET HELP IF:  · You do not get better as quickly as expected.  · You have new or worsening symptoms.  · You have lasting pain, weakness, or feel sick to your stomach (nauseous).  · You get better and then have another pain attack.  GET HELP RIGHT AWAY IF:   · You are unable to eat or keep fluids down.  · Your pain becomes severe.  · You have a fever or lasting symptoms for more than 2 to 3 days.  · You have a fever and your symptoms suddenly get worse.  · Your skin or the white part of your eyes turn yellow (jaundice).  · You throw up (vomit).  · You feel dizzy, or you pass out (faint).  · Your blood sugar is high (over 300 mg/dL).  MAKE SURE YOU:   · Understand these instructions.  · Will watch your condition.  · Will get help right away if you are not doing well or get worse.     This information is not intended to replace advice given to you by your health care provider. Make sure you discuss any questions you have with your health care provider.     Document Released: 06/05/2009 Document Revised: 01/08/2016 Document Reviewed: 03/28/2013  [employer] Interactive Patient Education ©2016 [employer]      Depression / Suicide Risk    As you are discharged from this [facility] facility, it is important to learn how to keep safe from harming yourself.    Recognize the warning signs:  · Abrupt changes in personality, positive or negative- including increase in energy   · Giving away possessions  · Change in eating patterns- significant weight changes-  positive or negative  · Change in sleeping patterns- unable to sleep or sleeping all the time   · Unwillingness or inability to communicate  · Depression  · Unusual sadness, discouragement and loneliness  · Talk of wanting to die  · Neglect of personal appearance   · Rebelliousness- reckless behavior  · Withdrawal from people/activities they love  · Confusion- inability to concentrate     If you or a loved one  observes any of these behaviors or has concerns about self-harm, here's what you can do:  · Talk about it- your feelings and reasons for harming yourself  · Remove any means that you might use to hurt yourself (examples: pills, rope, extension cords, firearm)  · Get professional help from the community (Mental Health, Substance Abuse, psychological counseling)  · Do not be alone:Call your Safe Contact- someone whom you trust who will be there for you.  · Call your local CRISIS HOTLINE 905-7440 or 753-038-0682  · Call your local Children's Mobile Crisis Response Team [place] (247) 037-3943 or www.Sensdata  · Call the toll free National Suicide Prevention Hotlines   · National Suicide Prevention Lifeline 173-841-TCLY (7771)  · National Hope Line Network 800-SUICIDE (989-7119)

## 2017-06-09 NOTE — CARE PLAN
Problem: Safety  Goal: Will remain free from injury  Outcome: PROGRESSING AS EXPECTED  Pt encouraged to request assistance with ambulation.    Problem: Pain Management  Goal: Pain level will decrease to patient’s comfort goal  Outcome: PROGRESSING AS EXPECTED  Pt medicated for pain and or nausea as needed.    Problem: Psychosocial Needs:  Goal: Level of anxiety will decrease  Outcome: PROGRESSING AS EXPECTED  Pt provided with a quiet environment to rest.

## 2017-06-09 NOTE — CARE PLAN
Problem: Nutritional:  Goal: Achieve adequate nutritional intake  Patient will consume >50% of meals  Outcome: PROGRESSING SLOWER THAN EXPECTED

## 2017-06-09 NOTE — CARE PLAN
Problem: Venous Thromboembolism (VTW)/Deep Vein Thrombosis (DVT) Prevention:  Goal: Patient will participate in Venous Thrombosis (VTE)/Deep Vein Thrombosis (DVT)Prevention Measures  Outcome: PROGRESSING AS EXPECTED  Lovenox in use. Pt educated on DVT/VTE prophylaxis. Understands importance of early and safe ambulation.        Problem: Pain Management  Goal: Pain level will decrease to patient’s comfort goal  Outcome: PROGRESSING AS EXPECTED  Pt encouraged to verbalize experiencing pain. 0-10 pain scale explained, verbalized understanding. Pain regimen adjusted by MD as needed. Educated re: non-mcgraw methods of pain relief. Administer pain meds as ordered.

## 2017-06-09 NOTE — PROGRESS NOTES
Report received. Pt sitting up in bed, no signs of distress. Pt instructed by nursing staff that she is not permitted to leave the floor. Pt reports pain and nausea, medications administered per MAR. Needs met at this time.

## 2017-06-09 NOTE — PROGRESS NOTES
Assessment completed--see doc flowsheet. A&Ox4. Sitting up in bed, calm, cooperative and pleasant. No acute distress noted. Meds provided. Medicated for nausea w/po zofran. Reports small BM yesterday. Hyperactive bowel sounds. POC discussed, verbalized understanding. Call light and personal possessions within reach, bed in low position, encouraged to call for assistance.

## 2017-06-09 NOTE — DIETARY
Nutrition services update.  Pt diet now advanced to full liquids.  GI added pancreatic enzymes with each meal.   Diet:  Full liquids 6/8  RD available PRN.

## 2017-06-09 NOTE — PROGRESS NOTES
[facility] Hospitalist Progress Note    Date of Service: 2017    Chief Complaint  62 y.o. female admitted 6/3/2017 with abdominal pain with N/V.    Interval Problem Update  Pt with hx of chronic pancreatitis, now with acute attack.      Pain in  Luq, sharp, intermittent, no radiation, intensity 5/10  Mri abdomen has not changed from previous mri    Case d/w gi doctor..advance diet to full liquids    Us ruq shows some pericholecystic fluid.HIDA scan WNL    Discussed plan and pt condition with RN at bedside and social work.    Consultants/Specialty  Gi dr guan     Disposition  Home when stable        Review of Systems   Constitutional: Negative for fever, chills and malaise/fatigue.   HENT: Negative for congestion.    Respiratory: Negative for sputum production, shortness of breath and stridor.    Cardiovascular: Negative for chest pain and palpitations.   Gastrointestinal: Positive for nausea, abdominal pain and diarrhea. Negative for vomiting.   Genitourinary: Negative for dysuria, urgency and frequency.   Musculoskeletal: Negative for myalgias, falls and neck pain.   Neurological: Negative for dizziness, tingling, tremors, sensory change and loss of consciousness.   Psychiatric/Behavioral: Negative for depression and suicidal ideas.   All other systems reviewed and are negative.     Physical Exam  Laboratory/Imaging   Hemodynamics  Temp (24hrs), Av.9 °C (98.5 °F), Min:36.5 °C (97.7 °F), Max:37.4 °C (99.3 °F)   Temperature: 37.4 °C (99.3 °F) (mult blankets in place)  Pulse  Av.1  Min: 50  Max: 120    Blood Pressure: 117/75 mmHg      Respiratory      Respiration: 18, Pulse Oximetry: 95 %        RUL Breath Sounds: Clear, RML Breath Sounds: Diminished, RLL Breath Sounds: Diminished, RODY Breath Sounds: Diminished, LLL Breath Sounds: Diminished    Fluids    Intake/Output Summary (Last 24 hours) at 17 1924  Last data filed at 17 1800   Gross per 24 hour   Intake   1140 ml   Output      0 ml   Net    1140 ml       Nutrition  Orders Placed This Encounter   Procedures   • DIET ORDER     Standing Status: Standing      Number of Occurrences: 1      Standing Expiration Date:      Order Specific Question:  Diet:     Answer:  Full Liquid [11]     Physical Exam   Constitutional: She is oriented to person, place, and time. No distress.   HENT:   Head: Normocephalic and atraumatic.   Mouth/Throat: No oropharyngeal exudate.   Eyes: Right eye exhibits no discharge. Left eye exhibits no discharge. No scleral icterus.   Neck: Neck supple.   Cardiovascular: Normal rate and regular rhythm.    No murmur heard.  Pulmonary/Chest: Effort normal and breath sounds normal. No stridor. No respiratory distress. She has no wheezes. She exhibits no tenderness.   Abdominal: Soft. Bowel sounds are normal. There is tenderness (luq).   Musculoskeletal: She exhibits no edema or tenderness.   Lymphadenopathy:     She has no cervical adenopathy.   Neurological: She is alert and oriented to person, place, and time.   Skin: Skin is warm and dry. She is not diaphoretic. No erythema.   Psychiatric: She has a normal mood and affect. Her behavior is normal. Judgment normal.   Nursing note and vitals reviewed.      Recent Labs      06/07/17   0419   WBC  7.4   RBC  3.93*   HEMOGLOBIN  11.5*   HEMATOCRIT  33.7*   MCV  85.8   MCH  29.3   MCHC  34.1   RDW  40.4   PLATELETCT  243   MPV  9.5     Recent Labs      06/06/17   1313  06/07/17   0419   SODIUM  137  140   POTASSIUM  4.0  4.8   CHLORIDE  107  112   CO2  17*  23   GLUCOSE  71  167*   BUN  11  8   CREATININE  0.68  0.65   CALCIUM  8.1*  7.8*                      Assessment/Plan     Acute on chronic pancreatitis (CMS-HCC) (present on admission)  Assessment & Plan  Clear liquid diet      Gi eval  Added pancreatic enzymes today with each meal  - continue symptomatic care    Hypokalemia (present on admission)  Assessment & Plan  - resolved    Normocytic anemia (present on admission)  Assessment & Plan  -  no sign of gross bleeding  - repeat cbc in am    Hyperchloremic acidosis (present on admission)  Assessment & Plan  hydrate      Labs reviewed, Medications reviewed and Radiology images reviewed  Lyn catheter: No Lyn      DVT Prophylaxis: Enoxaparin (Lovenox)              Check am  amylase, lipase    Added oxycontin 10mg bid

## 2017-06-10 NOTE — DISCHARGE SUMMARY
DATE OF ADMISSION:  06/03    DATE OF DISCHARGE:  06/09    CONSULTATIONS:  Dr. Myers of [facility].    This is a patient who came in with abdominal pain, diagnosed with   acute-on-chronic pancreatitis.  She was hospitalized and monitored her   electrolytes, made n.p.o.  Once the pain had been improved, we tried to give   the patient some food.  With food intake, patient's pain got worse, so we made   her n.p.o. again.  We ultrasound her right upper quadrant, did MRI of the   abdomen.  We consulted Dr. Myers.  She had a known cyst that did not increase   in size in the pancreas.  She was started on pancreatic enzymes.  We started   her on clear liquid diet and advanced to full liquid diet, which patient   tolerated.  She was therefore discharged to follow up with Dr. Gloria in the   outpatient setting with the below medications:  Aspirin 81 daily, OxyIR 50 mg   every 6 hours p.r.n. for pain, OxyContin 10 mg b.i.d., Prilosec 20 mg daily,   Creon 6000 units 2 caps t.i.d. with meals, Zofran 4 mg every 4 hours for   nausea and vomiting.    PERTINENT LABORATORY DATA:  White count of 7, hemoglobin 11, hematocrit 33,   platelets 243.  Sodium 140, potassium 4.8, glucose 167, BUN 8, creatinine   0.65.  Lipase is 11, amylase 63.  MRI of the abdomen showing stable oval cyst   and uncinate process of the pancreas unchanged in size.  Repeat MRI is   recommended in 1 year.  Gallbladder unremarkable.    IMPRESSION:  1.  Acute-on-chronic pancreatitis.  2.  Known pancreatic cyst.  3.  Chronic pain syndrome.  4.  Anemia of chronic disease.  5.  Hypokalemia, treated.  6.  Dehydration, treated.    On day of discharge 38 minutes taken with this patient.  Patient again will   follow up with Dr. Gloria in the next upcoming weeks.       ____________________________________     MD REMIGIO SALINAS / ANGEL    DD:  06/10/2017 10:24:22  DT:  06/10/2017 11:53:01    D#:  [id number]  Job#:  [id number]

## 2018-03-31 NOTE — H&P
REASON FOR ADMISSION:  Intractable nausea and vomiting with marked   hypokalemia.    HISTORY OF PRESENT ILLNESS:  The patient is a 62-year-old female that has   battled chronic pancreatitis related to prior pseudocyst.  She has episodes   periodically where she has very significant nausea and vomiting but these are   usually self-limited.  Unfortunately, she has had a bout that lasted a week.    She has had little to eat, but has been able to maintain fluids.  The emesis   has been intractable, although primarily just dry heaves.  Due to the   persistence and low grade temperature at 101, she presents for definitive   treatment.    Potassium measures 2.0 here in the emergency room.  She is clinically   dehydrated.  She has a low grade temperature of 100, but no obvious source of   infection.  As mentioned, there has been no black or bloody stools.  Emesis is   primarily foamy fluid, more consistent with dry heaves.  There has been no   estelita hematemesis, although a single bout of emesis did have some dark   material in it yesterday.  She denies cough or upper respiratory infection.    She quit smoking 6 weeks ago and evidently continues to refrain.  She denies   chest pain, focal neurologic deficit, or skin rash.  Remainder of 10-point   review of systems per CMS guidelines is negative.    PAST MEDICAL HISTORY:  1.  Probable chronic obstructive pulmonary disease with heavy smoking history.  2.  Chronic pancreatitis and recurrent pancreatitis with pancreatic cysts.  3.  Chronic back pain.  4.  Neuropathy.  5.  History of colon cancer.  6.  Prior history of deep venous thrombosis in .  7.  Anxiety.    PAST SURGICAL HISTORY:  1.  Hysterectomy.  2.  Tonsillectomy.  3.  .  4.  Partial colectomy.  5.  Cataract surgeries.    SOCIAL HISTORY:  She quit smoking 6 weeks ago.  She is a rare drinker.  She   denies any drug use.  She is followed by Ludy Harp.    FAMILY HISTORY:  Reviewed, is  noncontributory.    CURRENT MEDICATIONS:  1.  Aspirin 81 mg daily.  2.  Oxycodone 15 mg every 4 hours as needed for pain.    ALLERGIES:  IODINE.    REVIEW OF SYSTEMS:  As in the HPI.    PHYSICAL EXAMINATION:  VITAL SIGNS:  She has temperatures 100, pulse in the 60s, respiratory rate in   the teens, blood pressure currently 94/68.  GENERAL:  She is rather frail appearing, very thin.  She appears older than   stated age.  She is not in significant distress.  HEENT:  Pupils are equal, round and reactive.  Extraocular movements intact.    Mucous membranes are somewhat dry.  NECK:  Supple without jugular venous distention, lymphadenopathy, or bruit.  CARDIOVASCULAR:  She is regular without murmur, rub, or gallop.  LUNGS:  Clear to auscultation bilaterally.  ABDOMEN:  Mildly tender to deep palpation over the epigastrium.  There is no   rebound tenderness.  BACK:  No CVA tenderness.  EXTREMITIES:  Warm.  There is no edema.  NEUROLOGIC:  Nonfocal.  SKIN:  No obvious rash.    LABORATORY DATA:  White count 9, hemoglobin 11, hematocrit 32, mean cell   volume is 85, platelets 248, neutrophils 82%, lymphocytes 9%, monocytes 8%.    Sodium 140, potassium 3.0, chloride 119, bicarbonate 12, glucose 83, BUN 14,   creatinine 0.4, calcium 5.5, AST 10, ALT 9, alkaline phosphatase 40, total   bilirubin 0.3, albumin 2.5, total protein is 4.2, lipase 13, lactic acid 1.25.    Urinalysis 2-5 whites per high powered field with rare bacteria and trace   leukocyte esterase and nitrite negative.    STUDIES:  Chest x-ray is negative.    IMPRESSION:  1.  Intractable nausea and vomiting.  2.  Severe hypokalemia.  3.  Febrile illness with normal lipase, query related to chronic pancreatitis   versus viral infection versus gastroenteritis.  4.  Chronic obstructive pulmonary disease, patient has quit smoking.  5.  Chronic pain.  6.  Neuropathy.  7.  Anxiety.  8.  Hypotension, suspect physiologic and dehydration related, but nonseptic.    PLAN:  1.   For the hypokalemia, this will be replaced with IV fluids and orally.  We   will also provide magnesium supplementation.  Electrolytes will be followed   every 6 hours until potassium is corrected into the safe range.  She will be   admitted to a monitored bed.  2.  For the fever and hypotension.  She does not actually appear septic.  I   will hold on antibiotics.  Aggressive fluid resuscitation is underway.  We   will draw blood cultures x2 and obtain ultrasound of the right upper quadrant,   but my suspicion for cholecystitis is low and antibiotics will currently be   held.  3.  Chronic pain.  I will continue her home regimen and provide PRNs.  4.  Intractable nausea and vomiting.  I will provide Reglan and as needed   antiemetics.  5.  Chronic obstructive pulmonary disease.  We will follow respiratory status   closely.  6.  Prophylaxis.  Lovenox, laxatives.       ____________________________________     MD SANDEEP HICKEY / ANGEL    DD:  06/03/2017 16:54:42  DT:  06/03/2017 18:55:40    D#:  [id number]  Job#:  [id number]   English

## 2018-06-20 NOTE — PROGRESS NOTES
Bedside report received from [facility] nurseLoc RN. Assumed care. Pt resting in bed. Safety precautions in place.    never used

## 2018-07-22 ENCOUNTER — APPOINTMENT (OUTPATIENT)
Dept: RADIOLOGY | Facility: MEDICAL CENTER | Age: 63
End: 2018-07-22
Attending: EMERGENCY MEDICINE
Payer: MEDICARE

## 2018-07-22 ENCOUNTER — HOSPITAL ENCOUNTER (EMERGENCY)
Facility: MEDICAL CENTER | Age: 63
End: 2018-07-22
Attending: EMERGENCY MEDICINE
Payer: MEDICARE

## 2018-07-22 VITALS
RESPIRATION RATE: 16 BRPM | WEIGHT: 92.59 LBS | OXYGEN SATURATION: 94 % | HEART RATE: 75 BPM | HEIGHT: 59 IN | DIASTOLIC BLOOD PRESSURE: 94 MMHG | BODY MASS INDEX: 18.67 KG/M2 | TEMPERATURE: 98 F | SYSTOLIC BLOOD PRESSURE: 136 MMHG

## 2018-07-22 DIAGNOSIS — F11.93 OPIATE WITHDRAWAL (HCC): ICD-10-CM

## 2018-07-22 DIAGNOSIS — K86.2 PANCREATIC CYST: ICD-10-CM

## 2018-07-22 DIAGNOSIS — R10.12 LEFT UPPER QUADRANT PAIN: ICD-10-CM

## 2018-07-22 DIAGNOSIS — E86.0 DEHYDRATION: ICD-10-CM

## 2018-07-22 LAB
ALBUMIN SERPL BCP-MCNC: 4.4 G/DL (ref 3.2–4.9)
ALBUMIN/GLOB SERPL: 1.6 G/DL
ALP SERPL-CCNC: 75 U/L (ref 30–99)
ALT SERPL-CCNC: 9 U/L (ref 2–50)
ANION GAP SERPL CALC-SCNC: 11 MMOL/L (ref 0–11.9)
APPEARANCE UR: CLEAR
AST SERPL-CCNC: 14 U/L (ref 12–45)
BASOPHILS # BLD AUTO: 0.5 % (ref 0–1.8)
BASOPHILS # BLD: 0.05 K/UL (ref 0–0.12)
BILIRUB SERPL-MCNC: 0.4 MG/DL (ref 0.1–1.5)
BUN SERPL-MCNC: 20 MG/DL (ref 8–22)
CALCIUM SERPL-MCNC: 8.6 MG/DL (ref 8.4–10.2)
CHLORIDE SERPL-SCNC: 110 MMOL/L (ref 96–112)
CO2 SERPL-SCNC: 18 MMOL/L (ref 20–33)
COLOR UR: ABNORMAL
CREAT SERPL-MCNC: 0.74 MG/DL (ref 0.5–1.4)
EOSINOPHIL # BLD AUTO: 0.01 K/UL (ref 0–0.51)
EOSINOPHIL NFR BLD: 0.1 % (ref 0–6.9)
ERYTHROCYTE [DISTWIDTH] IN BLOOD BY AUTOMATED COUNT: 42.8 FL (ref 35.9–50)
GLOBULIN SER CALC-MCNC: 2.7 G/DL (ref 1.9–3.5)
GLUCOSE SERPL-MCNC: 123 MG/DL (ref 65–99)
GLUCOSE UR STRIP.AUTO-MCNC: NEGATIVE MG/DL
HCT VFR BLD AUTO: 44.2 % (ref 37–47)
HGB BLD-MCNC: 15 G/DL (ref 12–16)
IMM GRANULOCYTES # BLD AUTO: 0.04 K/UL (ref 0–0.11)
IMM GRANULOCYTES NFR BLD AUTO: 0.4 % (ref 0–0.9)
KETONES UR STRIP.AUTO-MCNC: 15 MG/DL
LEUKOCYTE ESTERASE UR QL STRIP.AUTO: NEGATIVE
LIPASE SERPL-CCNC: 16 U/L (ref 7–58)
LYMPHOCYTES # BLD AUTO: 0.87 K/UL (ref 1–4.8)
LYMPHOCYTES NFR BLD: 8 % (ref 22–41)
MCH RBC QN AUTO: 29.2 PG (ref 27–33)
MCHC RBC AUTO-ENTMCNC: 33.9 G/DL (ref 33.6–35)
MCV RBC AUTO: 86 FL (ref 81.4–97.8)
MONOCYTES # BLD AUTO: 0.28 K/UL (ref 0–0.85)
MONOCYTES NFR BLD AUTO: 2.6 % (ref 0–13.4)
NEUTROPHILS # BLD AUTO: 9.58 K/UL (ref 2–7.15)
NEUTROPHILS NFR BLD: 88.4 % (ref 44–72)
NITRITE UR QL STRIP.AUTO: NEGATIVE
NRBC # BLD AUTO: 0 K/UL
NRBC BLD-RTO: 0 /100 WBC
PH UR STRIP.AUTO: 6 [PH]
PLATELET # BLD AUTO: 310 K/UL (ref 164–446)
PMV BLD AUTO: 9.5 FL (ref 9–12.9)
POTASSIUM SERPL-SCNC: 3.6 MMOL/L (ref 3.6–5.5)
PROT SERPL-MCNC: 7.1 G/DL (ref 6–8.2)
PROT UR QL STRIP: 30 MG/DL
RBC # BLD AUTO: 5.14 M/UL (ref 4.2–5.4)
RBC UR QL AUTO: NEGATIVE
SODIUM SERPL-SCNC: 139 MMOL/L (ref 135–145)
SP GR UR STRIP.AUTO: >=1.03
WBC # BLD AUTO: 10.8 K/UL (ref 4.8–10.8)

## 2018-07-22 PROCEDURE — 96376 TX/PRO/DX INJ SAME DRUG ADON: CPT

## 2018-07-22 PROCEDURE — 36415 COLL VENOUS BLD VENIPUNCTURE: CPT

## 2018-07-22 PROCEDURE — 700105 HCHG RX REV CODE 258: Performed by: EMERGENCY MEDICINE

## 2018-07-22 PROCEDURE — 81002 URINALYSIS NONAUTO W/O SCOPE: CPT

## 2018-07-22 PROCEDURE — 83690 ASSAY OF LIPASE: CPT

## 2018-07-22 PROCEDURE — 80053 COMPREHEN METABOLIC PANEL: CPT

## 2018-07-22 PROCEDURE — 700111 HCHG RX REV CODE 636 W/ 250 OVERRIDE (IP): Performed by: EMERGENCY MEDICINE

## 2018-07-22 PROCEDURE — 96361 HYDRATE IV INFUSION ADD-ON: CPT

## 2018-07-22 PROCEDURE — 85025 COMPLETE CBC W/AUTO DIFF WBC: CPT

## 2018-07-22 PROCEDURE — 96374 THER/PROPH/DIAG INJ IV PUSH: CPT

## 2018-07-22 PROCEDURE — 96375 TX/PRO/DX INJ NEW DRUG ADDON: CPT

## 2018-07-22 PROCEDURE — 700102 HCHG RX REV CODE 250 W/ 637 OVERRIDE(OP): Performed by: EMERGENCY MEDICINE

## 2018-07-22 PROCEDURE — 99285 EMERGENCY DEPT VISIT HI MDM: CPT

## 2018-07-22 PROCEDURE — A9270 NON-COVERED ITEM OR SERVICE: HCPCS | Performed by: EMERGENCY MEDICINE

## 2018-07-22 PROCEDURE — 74176 CT ABD & PELVIS W/O CONTRAST: CPT

## 2018-07-22 RX ORDER — MORPHINE SULFATE 4 MG/ML
4 INJECTION, SOLUTION INTRAMUSCULAR; INTRAVENOUS ONCE
Status: COMPLETED | OUTPATIENT
Start: 2018-07-22 | End: 2018-07-22

## 2018-07-22 RX ORDER — SODIUM CHLORIDE 9 MG/ML
1000 INJECTION, SOLUTION INTRAVENOUS ONCE
Status: COMPLETED | OUTPATIENT
Start: 2018-07-22 | End: 2018-07-22

## 2018-07-22 RX ORDER — OXYCODONE AND ACETAMINOPHEN 7.5; 325 MG/1; MG/1
2 TABLET ORAL EVERY 4 HOURS PRN
Status: DISCONTINUED | OUTPATIENT
Start: 2018-07-22 | End: 2018-07-22 | Stop reason: HOSPADM

## 2018-07-22 RX ORDER — ASPIRIN 325 MG
325 TABLET ORAL EVERY 4 HOURS PRN
Status: SHIPPED | COMMUNITY
End: 2018-12-26

## 2018-07-22 RX ORDER — OXYCODONE HYDROCHLORIDE 15 MG/1
15 TABLET ORAL
Status: ON HOLD | COMMUNITY
End: 2018-12-29

## 2018-07-22 RX ORDER — ONDANSETRON 2 MG/ML
4 INJECTION INTRAMUSCULAR; INTRAVENOUS ONCE
Status: COMPLETED | OUTPATIENT
Start: 2018-07-22 | End: 2018-07-22

## 2018-07-22 RX ORDER — CYCLOBENZAPRINE HCL 10 MG
10 TABLET ORAL 3 TIMES DAILY
Status: SHIPPED | COMMUNITY
End: 2019-08-01

## 2018-07-22 RX ORDER — ONDANSETRON 4 MG/1
4 TABLET, ORALLY DISINTEGRATING ORAL EVERY 8 HOURS PRN
Qty: 30 TAB | Refills: 0 | Status: SHIPPED | OUTPATIENT
Start: 2018-07-22 | End: 2018-12-24

## 2018-07-22 RX ADMIN — MORPHINE SULFATE 4 MG: 4 INJECTION INTRAVENOUS at 17:47

## 2018-07-22 RX ADMIN — ONDANSETRON 4 MG: 2 INJECTION, SOLUTION INTRAMUSCULAR; INTRAVENOUS at 18:54

## 2018-07-22 RX ADMIN — ONDANSETRON 4 MG: 2 INJECTION, SOLUTION INTRAMUSCULAR; INTRAVENOUS at 17:47

## 2018-07-22 RX ADMIN — HYDROMORPHONE HYDROCHLORIDE 1 MG: 1 INJECTION, SOLUTION INTRAMUSCULAR; INTRAVENOUS; SUBCUTANEOUS at 18:54

## 2018-07-22 RX ADMIN — SODIUM CHLORIDE 1000 ML: 9 INJECTION, SOLUTION INTRAVENOUS at 18:02

## 2018-07-22 RX ADMIN — OXYCODONE HYDROCHLORIDE AND ACETAMINOPHEN 2 TABLET: 7.5; 325 TABLET ORAL at 20:24

## 2018-07-22 ASSESSMENT — PAIN SCALES - GENERAL
PAINLEVEL_OUTOF10: 8
PAINLEVEL_OUTOF10: 3
PAINLEVEL_OUTOF10: 4
PAINLEVEL_OUTOF10: 5

## 2018-07-22 NOTE — ED NOTES
Med rec updated and complete  Allergies reviewed  Interviewed pt with  at bedside with permission from pt.  Pt reports no antibiotics in the last 30 days.  Pt reports no vit sena.  Pt reports that she does take an ASPIRIN 81MG every day, and ASPIRIN 325MG as needed.

## 2018-07-22 NOTE — ED NOTES
Presents with a Hx of chronic pancreatitis.  She C/O acute onset of recurring exacerbations of LUQ abdomen since this past Thursday with episodic N/V.

## 2018-07-23 NOTE — DISCHARGE INSTRUCTIONS
Chronic Pancreatitis    Take a liquid only diet for 2 days then advance her diet slowly avoiding fat and protein the first 2-3 days.  Take Zofran for nausea and your regular OxyContin for pain.  Return for uncontrolled vomiting, dizziness, fever, severe uncontrolled abdominal pain.  See your doctor if not better in 3-4 days.    You had a borderline or high normal blood pressure reading today.  This does not necessarily mean you have hypertension.  Please followup with your/a primary physician for comprehensive blood pressure evaluation and yearly fasting cholesterol assessment.  BP Readings from Last 3 Encounters:   07/22/18 136/94   06/09/17 119/73   12/21/16 119/78         Introduction  Chronic pancreatitis is long-lasting inflammation and scarring of the pancreas. The pancreas is a gland that is located behind the stomach. It produces enzymes that help to digest food. The pancreas also releases the hormones glucagon and insulin, which help to regulate blood sugar. Damage to the pancreas may affect digestion, cause pain in the upper abdomen and back, and cause diabetes. Inflammation can also irritate other abdominal organs near the pancreas.  At the very beginning, pancreatitis may be sudden (acute). If acute pancreatitis is not caught in time or treated effectively, or if you have several or prolonged episodes of acute pancreatitis, then the condition can turn into chronic pancreatitis.  What are the causes?  The most common cause of this condition is alcohol abuse. Other causes include:  · High levels of triglycerides in the blood (hypertriglyceridemia).  · Gallstones or other conditions that can block the tube that drains the pancreas (pancreatic duct).  · Pancreatic cancer.  · Cystic fibrosis.  · Too much calcium in the blood (hypercalcemia), which may be caused by an overactive parathyroid gland (hyperparathyroidism).  · Certain medicines.  · Injury to the pancreas.  · Infection.  · Autoimmune pancreatitis.  This is when the body's disease-fighting (immune) system attacks the pancreas.  · Genes that are passed along from parent to child (inherited).  In some cases, the cause may not be known.  What increases the risk?  This condition is more likely to develop in:  · Men.  · People who are 40-60 years old.  What are the signs or symptoms?  Symptoms of this condition may include:  · Abdominal pain. Pain may also be felt in the upper back and may get worse after eating.  · Nausea and vomiting.  · Fever.  · Weight loss.  · A change in the color and consistency of bowel movements, such as diarrhea.  How is this diagnosed?  This condition is diagnosed based on your symptoms, your medical history, and a physical exam. You may have tests, such as:  · Blood tests.  · Stool samples.  · Biopsy of the pancreas. This is the removal of a small amount of pancreas tissue to be tested in a lab.  · Imaging studies, such as:  ¨ X-rays.  ¨ CT scan.  ¨ MRI.  ¨ Ultrasound.  How is this treated?  The goal of treatment is to help relieve symptoms and to prevent complications from occurring. Treatment focuses on:  · Resting the pancreas. You may need to stop eating and drinking for a few days while in the hospital to give your pancreas time to recover. During this time, you will be given IV fluids to keep you hydrated.  · Controlling pain. You may be given pain medicines by mouth (orally) or as injections.  · Improving digestion. You may be given:  ¨ Medicines to help balance your enzymes.  ¨ Vitamin supplements.  ¨ A specific diet to follow. If you are given a diet, you may work with a specialist (dietitian).  · Preventing diabetes. You may need insulin injections.  You may have surgery to:  · Clear the pancreatic ducts of any blockages, such as gallstones.  · Remove any fluid or damaged tissue from the pancreas.  Follow these instructions at home:  · Take over-the-counter and prescription medicines only as told by your health care provider.  This includes any vitamin supplements.  · Do not drive or operate heavy machinery while taking prescription pain medicines.  · Drink enough fluid to keep your urine clear or pale yellow.  · Do not drink alcohol. If you need help quitting, ask your health care provider.  · Do not use any tobacco products, such as cigarettes, chewing tobacco, and e-cigarettes. If you need help quitting, ask your health care provider.  · Follow a diet as told by your health care provider or dietitian, if this applies. This may include:  ¨ Limiting how much fat you eat.  ¨ Eating smaller meals more often.  ¨ Avoiding caffeine.  · Keep all follow-up visits as told by your health care provider. This is important.  Contact a health care provider if:  · You have pain that does not get better with medicine.  · You have a fever.  Get help right away if:  · Your pain suddenly gets worse.  · You have sudden abdominal swelling.  · You start to vomit often or you vomit blood.  · You have diarrhea that does not go away.  · You have blood in your stool.  This information is not intended to replace advice given to you by your health care provider. Make sure you discuss any questions you have with your health care provider.  Document Released: 01/13/2017 Document Revised: 05/25/2017 Document Reviewed: 11/25/2015  © 2017 [employer]

## 2018-07-23 NOTE — ED PROVIDER NOTES
"ED Provider Note    CHIEF COMPLAINT  Chief Complaint   Patient presents with   • LUQ Pain       HPI  Antonieta Addison is a 63 y.o. female who presents for left upper quadrant abdominal pain since yesterday that is very severe.  It does radiate towards the back.  She has had uncontrolled vomiting of all food and fluids for the last 5 days and moderate mucoid diarrhea without blood.  No recent antibiotic use.  History of chronic intermittent pancreatitis.  No history of gallstones or alcohol use.  No clear trigger for this flare of pain.  She reports a fever to 101.  She has never had diverticulitis.  She has had pyelonephritis and states this is different.  Denies dysuria, urgency or frequency.  She has vomited her oxycodone for the last 4 days.  She takes 90 mg a day normally.    REVIEW OF SYSTEMS  Pertinent positives include: History of peptic ulcer disease.  Pertinent negatives include: No constipation diabetes immunocompromise or recent antibiotic use..  10+ systems reviewed and negative.      PAST MEDICAL HISTORY  Past Medical History:   Diagnosis Date   • Angina     occasional, had cardiac workup \"ait was fine\"   • Arthritis     rheumatoid; hands & R shoulder   • ASTHMA     \"not an issue since pneumonia in 1995\"   • Back pain    • Bowel habit changes     constipation   • Bronchitis 1995   • Cancer (HCC)     colon CA 1977   • Cataract     surgical repair bilateral   • COPD     asthma, denies COPD   • Dental disorder     upper partial denture   • Fall     occasionally due to macular degeneration   • Heart burn    • Heart murmur     as child   • Hepatitis A 1980's   • Hiatus hernia syndrome    • Hypertension     weight loss, taken off medication approx 2014   • Macular degeneration    • Other specified symptom associated with female genital organs     hysterectomy   • Pain     mid back, low back   • PAIN DISORDER    • Panic attack    • Personal history of venous thrombosis and embolism     1970   • " Pneumonia 1995   • Psychiatric problem     panic attacks   • Snoring     sleep study done approx 1998, no treatment recommended       SOCIAL HISTORY  Social History   Substance Use Topics   • Smoking status: Former Smoker     Packs/day: 0.50     Years: 32.00     Types: Cigarettes     Start date: 1/1/1978     Quit date: 4/1/2017   • Smokeless tobacco: Never Used      Comment: 1 ppd times 30yrs   • Alcohol use No     History   Drug Use No       SURGICAL HISTORY  Past Surgical History:   Procedure Laterality Date   • GASTROSCOPY-ENDO N/A 4/8/2016    Procedure: GASTROSCOPY-ENDO W/POSS BIOPSY, DILATION, PANCREAS POLYPECTOMY AND CONTROL OF HEMORRHAGE;  Surgeon: Nathan Gloria M.D.;  Location: [facility];  Service:    • EGD W/ENDOSCOPIC ULTRASOUND N/A 4/8/2016    Procedure: EGD W/ENDOSCOPIC ULTRASOUND;  Surgeon: Nathan Gloria M.D.;  Location: [facility];  Service:    • EGD WITH ASP/BX N/A 4/8/2016    Procedure: EGD WITH ASP/BX - FNA;  Surgeon: Nathan Gloria M.D.;  Location: [facility];  Service:    • CATARACT EXTRACTION WITH IOL  2013   • COLON RESECTION  1983   • LAPAROSCOPY  1980's   • LAPAROTOMY  1977    bilateral salpingo-oophorectomy   • HYSTERECTOMY, TOTAL ABDOMINAL  1975   • TONSILLECTOMY  1957   • EYE SURGERY Bilateral 1950's    muscle repair   • PRIMARY C SECTION  1972, 1973       CURRENT MEDICATIONS    Current Outpatient Prescriptions   Medication Sig Dispense Refill   • oxycodone (OXY-IR) 15 MG immediate release tablet Take 15 mg by mouth every four hours as needed for Severe Pain.     • cyclobenzaprine (FLEXERIL) 10 MG Tab Take 10 mg by mouth 3 times a day.     • aspirin (ASA) 325 MG Tab Take 325 mg by mouth every four hours as needed for Mild Pain or Inflammation.     • NON SPECIFIED Take 2 Tabs by mouth every 6 hours as needed (For cold symptoms). OTC - Cold Medication      • aspirin 81 MG tablet Take 81 mg by mouth every day.         ALLERGIES  Allergies  "  Allergen Reactions   • Iodine Hives     IVP dye   • Tape Itching     Peels skin off  Paper tape ok, if it is not on to long.       PHYSICAL EXAM  VITAL SIGNS: /94   Pulse 75   Temp 36.7 °C (98 °F)   Resp 16   Ht 1.499 m (4' 11\") Comment: Stated  Wt 42 kg (92 lb 9.5 oz)   SpO2 93%   BMI 18.70 kg/m²   Reviewed and borderline blood pressure elevation  Constitutional: Well developed, Well nourished, well-appearing, does not appear to be in 10 of 10 pain.  HENT: Normocephalic, atraumatic, bilateral external ears normal, oropharynx moist, No exudates or erythema.   Eyes: PERRLA, conjunctiva pink, no scleral icterus.   Cardiovascular: Regular S1-S2 without murmur, rub, gallop.  Respiratory: No rales, rhonchi, wheeze.  Gastrointestinal: Very minimal left upper quadrant tenderness, bowel sounds normal, no distention, no masses or pulsatile masses.  Skin: No erythema, no rash.   Genitourinary:  No costovertebral angle tenderness.   Neurologic: Alert & oriented x 3, cranial nerves 2-12 intact by passive exam.  No focal deficit noted.  Psychiatric: Affect normal, Judgment normal, Mood normal.     DIFFERENTIAL DIAGNOSIS:  Chronic pancreatitis, dehydration, opiate withdrawal, pyelonephritis, bowel obstruction.      RADIOLOGY/PROCEDURES CT abdomen pelvis ordered by my colleague who triaged the patient prior to my start of shift  CT-RENAL COLIC EVALUATION(A/P W/O)   Final Result         1. No urinary tract calculus identified. No renal collecting system in dilatation.      2. Mild parenchymal wall thickening throughout the decompressed colon, most in the splenic flexure. This could relate to underdistention. Mild colitis is in the differential.      3. Grossly similar cystic lesion in the pancreatic head.          LABORATORY:  Results for orders placed or performed during the hospital encounter of 07/22/18   CBC WITH DIFFERENTIAL   Result Value Ref Range    WBC 10.8 4.8 - 10.8 K/uL    RBC 5.14 4.20 - 5.40 M/uL    " Hemoglobin 15.0 12.0 - 16.0 g/dL    Hematocrit 44.2 37.0 - 47.0 %    MCV 86.0 81.4 - 97.8 fL    MCH 29.2 27.0 - 33.0 pg    MCHC 33.9 33.6 - 35.0 g/dL    RDW 42.8 35.9 - 50.0 fL    Platelet Count 310 164 - 446 K/uL    MPV 9.5 9.0 - 12.9 fL    Neutrophils-Polys 88.40 (H) 44.00 - 72.00 %    Lymphocytes 8.00 (L) 22.00 - 41.00 %    Monocytes 2.60 0.00 - 13.40 %    Eosinophils 0.10 0.00 - 6.90 %    Basophils 0.50 0.00 - 1.80 %    Immature Granulocytes 0.40 0.00 - 0.90 %    Nucleated RBC 0.00 /100 WBC    Neutrophils (Absolute) 9.58 (H) 2.00 - 7.15 K/uL    Lymphs (Absolute) 0.87 (L) 1.00 - 4.80 K/uL    Monos (Absolute) 0.28 0.00 - 0.85 K/uL    Eos (Absolute) 0.01 0.00 - 0.51 K/uL    Baso (Absolute) 0.05 0.00 - 0.12 K/uL    Immature Granulocytes (abs) 0.04 0.00 - 0.11 K/uL    NRBC (Absolute) 0.00 K/uL   COMP METABOLIC PANEL   Result Value Ref Range    Sodium 139 135 - 145 mmol/L    Potassium 3.6 3.6 - 5.5 mmol/L    Chloride 110 96 - 112 mmol/L    Co2 18 (L) 20 - 33 mmol/L    Anion Gap 11.0 0.0 - 11.9    Glucose 123 (H) 65 - 99 mg/dL    Bun 20 8 - 22 mg/dL    Creatinine 0.74 0.50 - 1.40 mg/dL    Calcium 8.6 8.4 - 10.2 mg/dL    AST(SGOT) 14 12 - 45 U/L    ALT(SGPT) 9 2 - 50 U/L    Alkaline Phosphatase 75 30 - 99 U/L    Total Bilirubin 0.4 0.1 - 1.5 mg/dL    Albumin 4.4 3.2 - 4.9 g/dL    Total Protein 7.1 6.0 - 8.2 g/dL    Globulin 2.7 1.9 - 3.5 g/dL    A-G Ratio 1.6 g/dL   LIPASE   Result Value Ref Range    Lipase 16 7 - 58 U/L       INTERVENTIONS: Indication IV fluids as indicated by history of 4 days of vomiting which suggest moderate dehydration.  Dehydration confirmed by low CO2.  Medications   oxyCODONE-acetaminophen (PERCOCET) 7.5-325 MG per tablet 2 Tab (2 Tabs Oral Given 7/22/18 2024)   morphine (pf) 4 mg/ml injection 4 mg (4 mg Intravenous Given 7/22/18 1747)   ondansetron (ZOFRAN) syringe/vial injection 4 mg (4 mg Intravenous Given 7/22/18 1747)   NS infusion 1,000 mL (1,000 mL Intravenous New Bag 7/22/18 1802)    HYDROmorphone (DILAUDID) injection 1 mg (1 mg Intravenous Given 7/22/18 1854)   ondansetron (ZOFRAN) syringe/vial injection 4 mg (4 mg Intravenous Given 7/22/18 1854)     Response: Improved hydration and improved pain with resolution of vomiting on repeat assessment.  Patient was additionally able to tolerate her oral dose of oxycodone..    COURSE & MEDICAL DECISION MAKING  This patient presents with abdominal pain vomiting and dehydration and may have had an episode of chronic pancreatitis which caused her to go into acute opiate withdrawal.  There is no evidence of new or expanding pseudocyst.  There is no evidence of severe pancreatitis.  There is no evidence of clinical bowel obstruction.  There is no evidence of diverticulitis..    PLAN:  New Prescriptions    ONDANSETRON (ZOFRAN ODT) 4 MG TABLET DISPERSIBLE    Take 1 Tab by mouth every 8 hours as needed.     We will attempt urinalysis prior to discharge  Pancreatitis handout given  Clear fluid diet 2 days  Then advance diet slowly to low-fat low protein diet  Take your normal oxycodone for pain  Return for uncontrolled vomiting, severe pain, new fever, GI bleed, dizziness or ill appearance    [facility]  [address] [place] [id number] [phone]    or your regular doctor if not better 4 days      CONDITION: Stable, improved.    FINAL IMPRESSION  1. Left upper quadrant pain    2. Pancreatic cyst    3. Opiate withdrawal (HCC)    4. Dehydration    5.      Chronic pancreatitis      Electronically signed by: Vmashi Molina, 7/22/2018 5:44 PM

## 2018-07-23 NOTE — ED NOTES
Pt denies improvement in pain or nausea following medication. ERP aware, additional med orders placed.

## 2018-07-23 NOTE — ED NOTES
POC urine done.  Patient provided printed discharge instructions which included signs and symptoms to look out for, why to return to ER, and other follow up appointment to make. Patient provided prescription for zofran, information on medication, and how to . Patient stated they understand discharge instructions and had no further questions or concerns at this time. Patient discharged to home with . Patient ambulated out of ER with stable gait.

## 2018-07-26 ENCOUNTER — HOSPITAL ENCOUNTER (INPATIENT)
Facility: MEDICAL CENTER | Age: 63
LOS: 4 days | DRG: 392 | End: 2018-07-30
Attending: EMERGENCY MEDICINE | Admitting: HOSPITALIST
Payer: MEDICARE

## 2018-07-26 ENCOUNTER — APPOINTMENT (OUTPATIENT)
Dept: RADIOLOGY | Facility: MEDICAL CENTER | Age: 63
DRG: 392 | End: 2018-07-26
Attending: EMERGENCY MEDICINE
Payer: MEDICARE

## 2018-07-26 DIAGNOSIS — R11.2 NAUSEA AND VOMITING, INTRACTABILITY OF VOMITING NOT SPECIFIED, UNSPECIFIED VOMITING TYPE: ICD-10-CM

## 2018-07-26 DIAGNOSIS — R10.9 ABDOMINAL PAIN, UNSPECIFIED ABDOMINAL LOCATION: ICD-10-CM

## 2018-07-26 PROBLEM — E87.6 HYPOKALEMIA: Status: ACTIVE | Noted: 2018-07-26

## 2018-07-26 PROBLEM — E87.20 METABOLIC ACIDOSIS: Status: ACTIVE | Noted: 2018-07-26

## 2018-07-26 PROBLEM — K86.1 CHRONIC PANCREATITIS (HCC): Status: ACTIVE | Noted: 2018-07-26

## 2018-07-26 LAB
ALBUMIN SERPL BCP-MCNC: 4.6 G/DL (ref 3.2–4.9)
ALBUMIN/GLOB SERPL: 1.7 G/DL
ALP SERPL-CCNC: 75 U/L (ref 30–99)
ALT SERPL-CCNC: 9 U/L (ref 2–50)
ANION GAP SERPL CALC-SCNC: 12 MMOL/L (ref 0–11.9)
APPEARANCE UR: CLEAR
AST SERPL-CCNC: 13 U/L (ref 12–45)
BASOPHILS # BLD AUTO: 1 % (ref 0–1.8)
BASOPHILS # BLD: 0.12 K/UL (ref 0–0.12)
BILIRUB SERPL-MCNC: 0.3 MG/DL (ref 0.1–1.5)
BILIRUB UR QL STRIP.AUTO: NEGATIVE
BUN SERPL-MCNC: 10 MG/DL (ref 8–22)
CALCIUM SERPL-MCNC: 8.9 MG/DL (ref 8.4–10.2)
CHLORIDE SERPL-SCNC: 110 MMOL/L (ref 96–112)
CO2 SERPL-SCNC: 17 MMOL/L (ref 20–33)
COLOR UR: YELLOW
CREAT SERPL-MCNC: 0.78 MG/DL (ref 0.5–1.4)
CRP SERPL HS-MCNC: 0.06 MG/DL (ref 0–0.75)
EOSINOPHIL # BLD AUTO: 0.03 K/UL (ref 0–0.51)
EOSINOPHIL NFR BLD: 0.3 % (ref 0–6.9)
ERYTHROCYTE [DISTWIDTH] IN BLOOD BY AUTOMATED COUNT: 43.1 FL (ref 35.9–50)
ERYTHROCYTE [SEDIMENTATION RATE] IN BLOOD BY WESTERGREN METHOD: 0 MM/HOUR (ref 0–30)
GLOBULIN SER CALC-MCNC: 2.7 G/DL (ref 1.9–3.5)
GLUCOSE BLD-MCNC: 93 MG/DL (ref 65–99)
GLUCOSE SERPL-MCNC: 89 MG/DL (ref 65–99)
GLUCOSE UR STRIP.AUTO-MCNC: 100 MG/DL
HCT VFR BLD AUTO: 47.1 % (ref 37–47)
HGB BLD-MCNC: 15.8 G/DL (ref 12–16)
IMM GRANULOCYTES # BLD AUTO: 0.03 K/UL (ref 0–0.11)
IMM GRANULOCYTES NFR BLD AUTO: 0.3 % (ref 0–0.9)
KETONES UR STRIP.AUTO-MCNC: NEGATIVE MG/DL
LACTATE BLD-SCNC: 1.4 MMOL/L (ref 0.5–2)
LEUKOCYTE ESTERASE UR QL STRIP.AUTO: NEGATIVE
LIPASE SERPL-CCNC: 17 U/L (ref 7–58)
LYMPHOCYTES # BLD AUTO: 1.62 K/UL (ref 1–4.8)
LYMPHOCYTES NFR BLD: 13.9 % (ref 22–41)
MAGNESIUM SERPL-MCNC: 2.3 MG/DL (ref 1.5–2.5)
MCH RBC QN AUTO: 29.2 PG (ref 27–33)
MCHC RBC AUTO-ENTMCNC: 33.5 G/DL (ref 33.6–35)
MCV RBC AUTO: 87.1 FL (ref 81.4–97.8)
MICRO URNS: ABNORMAL
MONOCYTES # BLD AUTO: 0.52 K/UL (ref 0–0.85)
MONOCYTES NFR BLD AUTO: 4.5 % (ref 0–13.4)
NEUTROPHILS # BLD AUTO: 9.31 K/UL (ref 2–7.15)
NEUTROPHILS NFR BLD: 80 % (ref 44–72)
NITRITE UR QL STRIP.AUTO: NEGATIVE
NRBC # BLD AUTO: 0 K/UL
NRBC BLD-RTO: 0 /100 WBC
PH UR STRIP.AUTO: 5 [PH]
PLATELET # BLD AUTO: 322 K/UL (ref 164–446)
PMV BLD AUTO: 9.1 FL (ref 9–12.9)
POTASSIUM SERPL-SCNC: 3.5 MMOL/L (ref 3.6–5.5)
PROT SERPL-MCNC: 7.3 G/DL (ref 6–8.2)
PROT UR QL STRIP: NEGATIVE MG/DL
RBC # BLD AUTO: 5.41 M/UL (ref 4.2–5.4)
RBC UR QL AUTO: NEGATIVE
SODIUM SERPL-SCNC: 139 MMOL/L (ref 135–145)
SP GR UR STRIP.AUTO: <=1.005
TROPONIN I SERPL-MCNC: <0.02 NG/ML (ref 0–0.04)
TSH SERPL DL<=0.005 MIU/L-ACNC: 1.25 UIU/ML (ref 0.38–5.33)
WBC # BLD AUTO: 11.6 K/UL (ref 4.8–10.8)

## 2018-07-26 PROCEDURE — 83605 ASSAY OF LACTIC ACID: CPT

## 2018-07-26 PROCEDURE — 99285 EMERGENCY DEPT VISIT HI MDM: CPT

## 2018-07-26 PROCEDURE — 87040 BLOOD CULTURE FOR BACTERIA: CPT

## 2018-07-26 PROCEDURE — 700111 HCHG RX REV CODE 636 W/ 250 OVERRIDE (IP)

## 2018-07-26 PROCEDURE — 74022 RADEX COMPL AQT ABD SERIES: CPT

## 2018-07-26 PROCEDURE — 700111 HCHG RX REV CODE 636 W/ 250 OVERRIDE (IP): Performed by: EMERGENCY MEDICINE

## 2018-07-26 PROCEDURE — 700111 HCHG RX REV CODE 636 W/ 250 OVERRIDE (IP): Performed by: HOSPITALIST

## 2018-07-26 PROCEDURE — 83690 ASSAY OF LIPASE: CPT

## 2018-07-26 PROCEDURE — 84484 ASSAY OF TROPONIN QUANT: CPT

## 2018-07-26 PROCEDURE — 700101 HCHG RX REV CODE 250: Performed by: HOSPITALIST

## 2018-07-26 PROCEDURE — 82962 GLUCOSE BLOOD TEST: CPT

## 2018-07-26 PROCEDURE — 85652 RBC SED RATE AUTOMATED: CPT

## 2018-07-26 PROCEDURE — 96375 TX/PRO/DX INJ NEW DRUG ADDON: CPT

## 2018-07-26 PROCEDURE — 700102 HCHG RX REV CODE 250 W/ 637 OVERRIDE(OP): Performed by: HOSPITALIST

## 2018-07-26 PROCEDURE — 99221 1ST HOSP IP/OBS SF/LOW 40: CPT | Mod: AI | Performed by: HOSPITALIST

## 2018-07-26 PROCEDURE — 94760 N-INVAS EAR/PLS OXIMETRY 1: CPT

## 2018-07-26 PROCEDURE — 85025 COMPLETE CBC W/AUTO DIFF WBC: CPT

## 2018-07-26 PROCEDURE — 83735 ASSAY OF MAGNESIUM: CPT

## 2018-07-26 PROCEDURE — 84443 ASSAY THYROID STIM HORMONE: CPT

## 2018-07-26 PROCEDURE — 80053 COMPREHEN METABOLIC PANEL: CPT

## 2018-07-26 PROCEDURE — 700102 HCHG RX REV CODE 250 W/ 637 OVERRIDE(OP): Performed by: EMERGENCY MEDICINE

## 2018-07-26 PROCEDURE — A9270 NON-COVERED ITEM OR SERVICE: HCPCS | Performed by: EMERGENCY MEDICINE

## 2018-07-26 PROCEDURE — 96376 TX/PRO/DX INJ SAME DRUG ADON: CPT

## 2018-07-26 PROCEDURE — 36415 COLL VENOUS BLD VENIPUNCTURE: CPT

## 2018-07-26 PROCEDURE — 86140 C-REACTIVE PROTEIN: CPT

## 2018-07-26 PROCEDURE — 93005 ELECTROCARDIOGRAM TRACING: CPT | Performed by: EMERGENCY MEDICINE

## 2018-07-26 PROCEDURE — 96374 THER/PROPH/DIAG INJ IV PUSH: CPT

## 2018-07-26 PROCEDURE — 700105 HCHG RX REV CODE 258: Performed by: HOSPITALIST

## 2018-07-26 PROCEDURE — 81003 URINALYSIS AUTO W/O SCOPE: CPT

## 2018-07-26 PROCEDURE — 770020 HCHG ROOM/CARE - TELE (206)

## 2018-07-26 RX ORDER — ONDANSETRON 4 MG/1
4 TABLET, ORALLY DISINTEGRATING ORAL EVERY 4 HOURS PRN
Status: DISCONTINUED | OUTPATIENT
Start: 2018-07-26 | End: 2018-07-30 | Stop reason: HOSPADM

## 2018-07-26 RX ORDER — ONDANSETRON 2 MG/ML
4 INJECTION INTRAMUSCULAR; INTRAVENOUS ONCE
Status: COMPLETED | OUTPATIENT
Start: 2018-07-26 | End: 2018-07-26

## 2018-07-26 RX ORDER — POLYETHYLENE GLYCOL 3350 17 G/17G
1 POWDER, FOR SOLUTION ORAL
Status: DISCONTINUED | OUTPATIENT
Start: 2018-07-26 | End: 2018-07-27

## 2018-07-26 RX ORDER — POTASSIUM CHLORIDE 7.45 MG/ML
10 INJECTION INTRAVENOUS
Status: COMPLETED | OUTPATIENT
Start: 2018-07-26 | End: 2018-07-26

## 2018-07-26 RX ORDER — AMOXICILLIN 250 MG
2 CAPSULE ORAL 2 TIMES DAILY
Status: DISCONTINUED | OUTPATIENT
Start: 2018-07-26 | End: 2018-07-27

## 2018-07-26 RX ORDER — SODIUM CHLORIDE 9 MG/ML
INJECTION, SOLUTION INTRAVENOUS CONTINUOUS
Status: DISCONTINUED | OUTPATIENT
Start: 2018-07-26 | End: 2018-07-30 | Stop reason: HOSPADM

## 2018-07-26 RX ORDER — ACETAMINOPHEN 325 MG/1
650 TABLET ORAL EVERY 6 HOURS PRN
Status: DISCONTINUED | OUTPATIENT
Start: 2018-07-26 | End: 2018-07-30 | Stop reason: HOSPADM

## 2018-07-26 RX ORDER — BISACODYL 10 MG
10 SUPPOSITORY, RECTAL RECTAL
Status: DISCONTINUED | OUTPATIENT
Start: 2018-07-26 | End: 2018-07-27

## 2018-07-26 RX ORDER — ONDANSETRON 2 MG/ML
4 INJECTION INTRAMUSCULAR; INTRAVENOUS EVERY 4 HOURS PRN
Status: DISCONTINUED | OUTPATIENT
Start: 2018-07-26 | End: 2018-07-30 | Stop reason: HOSPADM

## 2018-07-26 RX ORDER — CEFTRIAXONE 2 G/1
INJECTION, POWDER, FOR SOLUTION INTRAMUSCULAR; INTRAVENOUS
Status: DISPENSED
Start: 2018-07-26 | End: 2018-07-27

## 2018-07-26 RX ORDER — FAMOTIDINE 20 MG/1
20 TABLET, FILM COATED ORAL 2 TIMES DAILY
Status: DISCONTINUED | OUTPATIENT
Start: 2018-07-26 | End: 2018-07-30 | Stop reason: HOSPADM

## 2018-07-26 RX ORDER — DICYCLOMINE HCL 20 MG
20 TABLET ORAL ONCE
Status: COMPLETED | OUTPATIENT
Start: 2018-07-26 | End: 2018-07-26

## 2018-07-26 RX ORDER — MORPHINE SULFATE 4 MG/ML
2 INJECTION, SOLUTION INTRAMUSCULAR; INTRAVENOUS
Status: DISCONTINUED | OUTPATIENT
Start: 2018-07-26 | End: 2018-07-27

## 2018-07-26 RX ORDER — MORPHINE SULFATE 4 MG/ML
INJECTION, SOLUTION INTRAMUSCULAR; INTRAVENOUS
Status: COMPLETED
Start: 2018-07-26 | End: 2018-07-26

## 2018-07-26 RX ORDER — MORPHINE SULFATE 4 MG/ML
3 INJECTION, SOLUTION INTRAMUSCULAR; INTRAVENOUS
Status: DISCONTINUED | OUTPATIENT
Start: 2018-07-26 | End: 2018-07-26

## 2018-07-26 RX ORDER — DEXTROSE MONOHYDRATE 25 G/50ML
25 INJECTION, SOLUTION INTRAVENOUS
Status: DISCONTINUED | OUTPATIENT
Start: 2018-07-26 | End: 2018-07-27

## 2018-07-26 RX ORDER — MORPHINE SULFATE 4 MG/ML
4 INJECTION, SOLUTION INTRAMUSCULAR; INTRAVENOUS ONCE
Status: COMPLETED | OUTPATIENT
Start: 2018-07-26 | End: 2018-07-26

## 2018-07-26 RX ADMIN — DICYCLOMINE HYDROCHLORIDE 20 MG: 20 TABLET ORAL at 15:24

## 2018-07-26 RX ADMIN — ONDANSETRON 4 MG: 2 INJECTION, SOLUTION INTRAMUSCULAR; INTRAVENOUS at 17:50

## 2018-07-26 RX ADMIN — FAMOTIDINE 20 MG: 10 INJECTION, SOLUTION INTRAVENOUS at 17:50

## 2018-07-26 RX ADMIN — MORPHINE SULFATE 3 MG: 4 INJECTION INTRAVENOUS at 17:17

## 2018-07-26 RX ADMIN — LIDOCAINE HYDROCHLORIDE 30 ML: 20 SOLUTION OROPHARYNGEAL at 12:24

## 2018-07-26 RX ADMIN — METRONIDAZOLE 500 MG: 500 INJECTION, SOLUTION INTRAVENOUS at 18:43

## 2018-07-26 RX ADMIN — MORPHINE SULFATE 3 MG: 4 INJECTION, SOLUTION INTRAMUSCULAR; INTRAVENOUS at 17:17

## 2018-07-26 RX ADMIN — POTASSIUM CHLORIDE 10 MEQ: 7.46 INJECTION, SOLUTION INTRAVENOUS at 19:48

## 2018-07-26 RX ADMIN — ONDANSETRON 4 MG: 2 INJECTION INTRAMUSCULAR; INTRAVENOUS at 12:24

## 2018-07-26 RX ADMIN — MORPHINE SULFATE 2 MG: 4 INJECTION INTRAVENOUS at 20:47

## 2018-07-26 RX ADMIN — SODIUM CHLORIDE: 900 INJECTION, SOLUTION INTRAVENOUS at 17:18

## 2018-07-26 RX ADMIN — MORPHINE SULFATE 2 MG: 4 INJECTION INTRAVENOUS at 22:57

## 2018-07-26 RX ADMIN — CEFTRIAXONE SODIUM 2 G: 2 INJECTION, POWDER, FOR SOLUTION INTRAMUSCULAR; INTRAVENOUS at 17:33

## 2018-07-26 RX ADMIN — POTASSIUM CHLORIDE 10 MEQ: 7.46 INJECTION, SOLUTION INTRAVENOUS at 20:47

## 2018-07-26 RX ADMIN — MORPHINE SULFATE 4 MG: 4 INJECTION INTRAVENOUS at 12:24

## 2018-07-26 RX ADMIN — MORPHINE SULFATE 2 MG: 4 INJECTION INTRAVENOUS at 18:45

## 2018-07-26 ASSESSMENT — COPD QUESTIONNAIRES
COPD SCREENING SCORE: 2
HAVE YOU SMOKED AT LEAST 100 CIGARETTES IN YOUR ENTIRE LIFE: NO/DON'T KNOW
DURING THE PAST 4 WEEKS HOW MUCH DID YOU FEEL SHORT OF BREATH: NONE/LITTLE OF THE TIME
HAVE YOU SMOKED AT LEAST 100 CIGARETTES IN YOUR ENTIRE LIFE: NO/DON'T KNOW
COPD SCREENING SCORE: 2
IN THE PAST 12 MONTHS DO YOU DO LESS THAN YOU USED TO BECAUSE OF YOUR BREATHING PROBLEMS: DISAGREE/UNSURE
DURING THE PAST 4 WEEKS HOW MUCH DID YOU FEEL SHORT OF BREATH: NONE/LITTLE OF THE TIME
DO YOU EVER COUGH UP ANY MUCUS OR PHLEGM?: NO/ONLY WITH OCCASIONAL COLDS OR INFECTIONS
DO YOU EVER COUGH UP ANY MUCUS OR PHLEGM?: NO/ONLY WITH OCCASIONAL COLDS OR INFECTIONS

## 2018-07-26 ASSESSMENT — ENCOUNTER SYMPTOMS
DIARRHEA: 1
BRUISES/BLEEDS EASILY: 0
NECK PAIN: 0
FLANK PAIN: 0
FEVER: 1
PND: 0
CHILLS: 1
ABDOMINAL PAIN: 1
HEADACHES: 0
SINUS PAIN: 0
VOMITING: 1
EYE REDNESS: 0
DEPRESSION: 0
DIZZINESS: 0
COUGH: 0
WHEEZING: 0
NAUSEA: 1
MYALGIAS: 0
WEAKNESS: 0
BLURRED VISION: 0
HEARTBURN: 0

## 2018-07-26 ASSESSMENT — PATIENT HEALTH QUESTIONNAIRE - PHQ9
1. LITTLE INTEREST OR PLEASURE IN DOING THINGS: NOT AT ALL
2. FEELING DOWN, DEPRESSED, IRRITABLE, OR HOPELESS: NOT AT ALL
1. LITTLE INTEREST OR PLEASURE IN DOING THINGS: NOT AT ALL
2. FEELING DOWN, DEPRESSED, IRRITABLE, OR HOPELESS: NOT AT ALL
SUM OF ALL RESPONSES TO PHQ9 QUESTIONS 1 AND 2: 0
SUM OF ALL RESPONSES TO PHQ9 QUESTIONS 1 AND 2: 0

## 2018-07-26 ASSESSMENT — COGNITIVE AND FUNCTIONAL STATUS - GENERAL
DAILY ACTIVITIY SCORE: 24
SUGGESTED CMS G CODE MODIFIER MOBILITY: CJ
MOBILITY SCORE: 22
WALKING IN HOSPITAL ROOM: A LITTLE
CLIMB 3 TO 5 STEPS WITH RAILING: A LITTLE
SUGGESTED CMS G CODE MODIFIER DAILY ACTIVITY: CH

## 2018-07-26 ASSESSMENT — PAIN SCALES - GENERAL
PAINLEVEL_OUTOF10: 10
PAINLEVEL_OUTOF10: 6
PAINLEVEL_OUTOF10: 5
PAINLEVEL_OUTOF10: 7
PAINLEVEL_OUTOF10: 8
PAINLEVEL_OUTOF10: 7

## 2018-07-26 ASSESSMENT — LIFESTYLE VARIABLES
ALCOHOL_USE: NO
EVER_SMOKED: YES

## 2018-07-26 NOTE — ED NOTES
Iv placed , labs drawn and taken to lab.  Pt medicated as directed by JENNA robert, poc update given to pt. Further orders and dispo pending at this time. No further questions from pt at this time

## 2018-07-26 NOTE — ED NOTES
".  Chief Complaint   Patient presents with   • LUQ Pain     Pt states she was in here on sunday for her pancreas and it just hasn't gotten any better. Pt states that it is now raadiating to her RUQ.      ./97   Pulse (!) 119   Temp 37.5 °C (99.5 °F)   Resp 20   Ht 1.499 m (4' 11\")   Wt 42.9 kg (94 lb 9.2 oz)   SpO2 97%   BMI 19.10 kg/m²     "

## 2018-07-26 NOTE — ASSESSMENT & PLAN NOTE
-Due to colitis, possibly due to chronic pancreatitis, atrophic pancreas  -Was improved since starting clears however now with nausea and vomiting  -Not eating much, no adjustment in diet  -If persists may need to go back to n.p.o.  -There is some guarding during abdominal exam, she also seems to suck in her stomach making the exam more difficult

## 2018-07-26 NOTE — ED PROVIDER NOTES
"  ED Provider Note    CHIEF COMPLAINT  Chief Complaint   Patient presents with   • LUQ Pain     Pt states she was in here on sunday for her pancreas and it just hasn't gotten any better. Pt states that it is now raadiating to her RUQ.        HPI  Antonieta Addison is a 63 y.o. female who presents with abdominal pain.  This started on Wednesday last week 8 days ago.  She has had before feels like her pancreatitis.  On Sunday she was in the emergency department in workup was done there with patient was given pain medication antinauseant she did have a positive cyst finding on her pancreas but no inflammation normal pancreatic enzymes she went home and felt better for 24 hours it has recurred.  Pain is in the left upper quadrant initially now is radiating the right upper quadrant.  Hurts to eat or drink.  She has had some diarrhea possible black stool no hematochezia.  She has vomiting but no hematemesis no dysuria.  All other systems are negative    REVIEW OF SYSTEMS  See HPI for further details    PAST MEDICAL HISTORY  Past Medical History:   Diagnosis Date   • Angina     occasional, had cardiac workup \"ait was fine\"   • Arthritis     rheumatoid; hands & R shoulder   • ASTHMA     \"not an issue since pneumonia in 1995\"   • Back pain    • Bowel habit changes     constipation   • Bronchitis 1995   • Cancer (HCC)     colon CA 1977   • Cataract     surgical repair bilateral   • COPD     asthma, denies COPD   • Dental disorder     upper partial denture   • Fall     occasionally due to macular degeneration   • Heart burn    • Heart murmur     as child   • Hepatitis A 1980's   • Hiatus hernia syndrome    • Hypertension     weight loss, taken off medication approx 2014   • Macular degeneration    • Other specified symptom associated with female genital organs     hysterectomy   • Pain     mid back, low back   • PAIN DISORDER    • Panic attack    • Personal history of venous thrombosis and embolism     1970   • " Pneumonia 1995   • Psychiatric problem     panic attacks   • Snoring     sleep study done approx 1998, no treatment recommended       FAMILY HISTORY  No family history on file.    SOCIAL HISTORY  Social History     Social History   • Marital status:      Spouse name: N/A   • Number of children: N/A   • Years of education: N/A     Social History Main Topics   • Smoking status: Former Smoker     Packs/day: 0.50     Years: 32.00     Types: Cigarettes     Start date: 1/1/1978     Quit date: 4/1/2017   • Smokeless tobacco: Never Used      Comment: 1 ppd times 30yrs   • Alcohol use No   • Drug use: No   • Sexual activity: Not on file     Other Topics Concern   • Not on file     Social History Narrative   • No narrative on file       SURGICAL HISTORY  Past Surgical History:   Procedure Laterality Date   • GASTROSCOPY-ENDO N/A 4/8/2016    Procedure: GASTROSCOPY-ENDO W/POSS BIOPSY, DILATION, PANCREAS POLYPECTOMY AND CONTROL OF HEMORRHAGE;  Surgeon: Nathan Gloria M.D.;  Location: [facility];  Service:    • EGD W/ENDOSCOPIC ULTRASOUND N/A 4/8/2016    Procedure: EGD W/ENDOSCOPIC ULTRASOUND;  Surgeon: Nathan Gloria M.D.;  Location: [facility];  Service:    • EGD WITH ASP/BX N/A 4/8/2016    Procedure: EGD WITH ASP/BX - FNA;  Surgeon: Nathan Gloria M.D.;  Location: [facility];  Service:    • CATARACT EXTRACTION WITH IOL  2013   • COLON RESECTION  1983   • LAPAROSCOPY  1980's   • LAPAROTOMY  1977    bilateral salpingo-oophorectomy   • HYSTERECTOMY, TOTAL ABDOMINAL  1975   • TONSILLECTOMY  1957   • EYE SURGERY Bilateral 1950's    muscle repair   • PRIMARY C SECTION  1972, 1973       CURRENT MEDICATIONS  Home Medications    **Home medications have not yet been reviewed for this encounter**         ALLERGIES  Allergies   Allergen Reactions   • Iodine Hives     IVP dye   • Tape Itching     Peels skin off  Paper tape ok, if it is not on to long.       PHYSICAL EXAM  VITAL SIGNS: BP  "142/97   Pulse (!) 119   Temp 37.5 °C (99.5 °F)   Resp 20   Ht 1.499 m (4' 11\")   Wt 42.9 kg (94 lb 9.2 oz)   SpO2 97%   BMI 19.10 kg/m²     Constitutional: Patient is alert and oriented x3 in moderate distress   HENT: Moist mucous membranes  Eyes: No conjunctivitis or icterus  Neck: Trachea is midline no palpable thyroid  Lymphatic: Negative anterior cervical lymphadenopathy  Cardiovascular: Normal heart rate   Thorax & Lungs: Clear to auscultation  Back: No CVA tenderness  Abdomen: Left greater than right upper quadrant tenderness palpation percussion no peritoneal findings guarding or rigidity bowel sounds are present  Neurologic: Normal motor and sensation  Extremities: No edema  Psychiatric: Affect normal, Judgment normal, Mood normal.     Results for orders placed or performed during the hospital encounter of 07/26/18   CBC WITH DIFFERENTIAL   Result Value Ref Range    WBC 11.6 (H) 4.8 - 10.8 K/uL    RBC 5.41 (H) 4.20 - 5.40 M/uL    Hemoglobin 15.8 12.0 - 16.0 g/dL    Hematocrit 47.1 (H) 37.0 - 47.0 %    MCV 87.1 81.4 - 97.8 fL    MCH 29.2 27.0 - 33.0 pg    MCHC 33.5 (L) 33.6 - 35.0 g/dL    RDW 43.1 35.9 - 50.0 fL    Platelet Count 322 164 - 446 K/uL    MPV 9.1 9.0 - 12.9 fL    Neutrophils-Polys 80.00 (H) 44.00 - 72.00 %    Lymphocytes 13.90 (L) 22.00 - 41.00 %    Monocytes 4.50 0.00 - 13.40 %    Eosinophils 0.30 0.00 - 6.90 %    Basophils 1.00 0.00 - 1.80 %    Immature Granulocytes 0.30 0.00 - 0.90 %    Nucleated RBC 0.00 /100 WBC    Neutrophils (Absolute) 9.31 (H) 2.00 - 7.15 K/uL    Lymphs (Absolute) 1.62 1.00 - 4.80 K/uL    Monos (Absolute) 0.52 0.00 - 0.85 K/uL    Eos (Absolute) 0.03 0.00 - 0.51 K/uL    Baso (Absolute) 0.12 0.00 - 0.12 K/uL    Immature Granulocytes (abs) 0.03 0.00 - 0.11 K/uL    NRBC (Absolute) 0.00 K/uL   COMP METABOLIC PANEL   Result Value Ref Range    Sodium 139 135 - 145 mmol/L    Potassium 3.5 (L) 3.6 - 5.5 mmol/L    Chloride 110 96 - 112 mmol/L    Co2 17 (L) 20 - 33 mmol/L    " Anion Gap 12.0 (H) 0.0 - 11.9    Glucose 89 65 - 99 mg/dL    Bun 10 8 - 22 mg/dL    Creatinine 0.78 0.50 - 1.40 mg/dL    Calcium 8.9 8.4 - 10.2 mg/dL    AST(SGOT) 13 12 - 45 U/L    ALT(SGPT) 9 2 - 50 U/L    Alkaline Phosphatase 75 30 - 99 U/L    Total Bilirubin 0.3 0.1 - 1.5 mg/dL    Albumin 4.6 3.2 - 4.9 g/dL    Total Protein 7.3 6.0 - 8.2 g/dL    Globulin 2.7 1.9 - 3.5 g/dL    A-G Ratio 1.7 g/dL   LIPASE   Result Value Ref Range    Lipase 17 7 - 58 U/L   TROPONIN   Result Value Ref Range    Troponin I <0.02 0.00 - 0.04 ng/mL   LACTIC ACID   Result Value Ref Range    Lactic Acid 1.4 0.5 - 2.0 mmol/L   URINALYSIS CULTURE, IF INDICATED   Result Value Ref Range    Color Yellow     Character Clear     Specific Gravity <=1.005 <1.035    Ph 5.0 5.0 - 8.0    Glucose 100 (A) Negative mg/dL    Ketones Negative Negative mg/dL    Protein Negative Negative mg/dL    Bilirubin Negative Negative    Nitrite Negative Negative    Leukocyte Esterase Negative Negative    Occult Blood Negative Negative    Micro Urine Req see below    ESTIMATED GFR   Result Value Ref Range    GFR If African American >60 >60 mL/min/1.73 m 2    GFR If Non African American >60 >60 mL/min/1.73 m 2   EKG (NOW)   Result Value Ref Range    Report       [facility] Emergency Dept.    Test Date:  2018  Pt Name:    MARCI PARKASHLEY         Department: [facility]  MRN:        [id number]                      [facility]  Gender:     Female                       Technician: HRS  :        [id number]                   Requested By:CHERI PLUMMER  Order #:    [id number]                    Sherrill MD:    Measurements  Intervals                                Axis  Rate:       82                           P:          53  MO:         140                          QRS:        2  QRSD:       80                           T:          42  QT:         383  QTc:        448    Interpretive Statements  Sinus rhythm  Abnormal R-wave  progression, early transition  Compared to ECG 03/24/2016 17:13:07  Atrial fibrillation no longer present  ST (T wave) deviation no longer present  Prolonged QT interval no longer present        EKG: Normal sinus rhythm 82 with a normal corrected QT interval normal QRS normal axis.  No old EKG for comparison      COURSE & MEDICAL DECISION MAKING  Pertinent Labs & Imaging studies reviewed. (See chart for details)    I did review the patient's chart and labs from previous visit there was no lab or radiographic findings of pancreatitis.  I am going to repeat the lab work not to CAT scan.  The patient has had some colon surgery room and 3 episodes of obstruction in the past she does not feel like it is obstruction now does not want any x-ray I think that is reasonable we will get x-ray if she has worsening.    I am to treat the patient's pain.  I am reevaluate and assess her lab work for pancreatitis.  Liver enzymes and pancreatic enzymes.  She has slight changes in her electrolytes show a slight hypokalemia's decreased CO2 with slight anion gap acidosis.  Patient has normal white count.    Patient is unable to hold her medications down at home.  I feel she is going into opiate withdrawal she is on chronic pain medication.  I did discuss the case with her GI doctor.  Patient is going to be admitted for hydration and control of emesis.  Patient's lab work shows no sign of pancreatitis normal      FINAL IMPRESSION  1.   2.   1. Abdominal pain, unspecified abdominal location    2. Nausea and vomiting, intractability of vomiting not specified, unspecified vomiting type       3.         Electronically signed by: Julio Hightower, 7/26/2018 11:49 AM

## 2018-07-27 LAB
ANION GAP SERPL CALC-SCNC: 5 MMOL/L (ref 0–11.9)
BUN SERPL-MCNC: 8 MG/DL (ref 8–22)
CALCIUM SERPL-MCNC: 8.2 MG/DL (ref 8.4–10.2)
CHLORIDE SERPL-SCNC: 112 MMOL/L (ref 96–112)
CHOLEST SERPL-MCNC: 204 MG/DL (ref 100–199)
CO2 SERPL-SCNC: 23 MMOL/L (ref 20–33)
CREAT SERPL-MCNC: 0.76 MG/DL (ref 0.5–1.4)
ERYTHROCYTE [DISTWIDTH] IN BLOOD BY AUTOMATED COUNT: 43.8 FL (ref 35.9–50)
GLUCOSE BLD-MCNC: 92 MG/DL (ref 65–99)
GLUCOSE BLD-MCNC: 92 MG/DL (ref 65–99)
GLUCOSE SERPL-MCNC: 90 MG/DL (ref 65–99)
HCT VFR BLD AUTO: 40.1 % (ref 37–47)
HDLC SERPL-MCNC: 38 MG/DL
HGB BLD-MCNC: 13.1 G/DL (ref 12–16)
LDLC SERPL CALC-MCNC: 129 MG/DL
MCH RBC QN AUTO: 29 PG (ref 27–33)
MCHC RBC AUTO-ENTMCNC: 32.7 G/DL (ref 33.6–35)
MCV RBC AUTO: 88.7 FL (ref 81.4–97.8)
PLATELET # BLD AUTO: 280 K/UL (ref 164–446)
PMV BLD AUTO: 9.3 FL (ref 9–12.9)
POTASSIUM SERPL-SCNC: 4.1 MMOL/L (ref 3.6–5.5)
RBC # BLD AUTO: 4.52 M/UL (ref 4.2–5.4)
SODIUM SERPL-SCNC: 140 MMOL/L (ref 135–145)
TRIGL SERPL-MCNC: 185 MG/DL (ref 0–149)
WBC # BLD AUTO: 8.2 K/UL (ref 4.8–10.8)

## 2018-07-27 PROCEDURE — A9270 NON-COVERED ITEM OR SERVICE: HCPCS | Performed by: INTERNAL MEDICINE

## 2018-07-27 PROCEDURE — 80061 LIPID PANEL: CPT

## 2018-07-27 PROCEDURE — 700111 HCHG RX REV CODE 636 W/ 250 OVERRIDE (IP): Performed by: HOSPITALIST

## 2018-07-27 PROCEDURE — 700111 HCHG RX REV CODE 636 W/ 250 OVERRIDE (IP): Performed by: FAMILY MEDICINE

## 2018-07-27 PROCEDURE — 99231 SBSQ HOSP IP/OBS SF/LOW 25: CPT | Performed by: INTERNAL MEDICINE

## 2018-07-27 PROCEDURE — 85027 COMPLETE CBC AUTOMATED: CPT

## 2018-07-27 PROCEDURE — 700102 HCHG RX REV CODE 250 W/ 637 OVERRIDE(OP): Performed by: HOSPITALIST

## 2018-07-27 PROCEDURE — 700105 HCHG RX REV CODE 258: Performed by: HOSPITALIST

## 2018-07-27 PROCEDURE — 82962 GLUCOSE BLOOD TEST: CPT | Mod: 91

## 2018-07-27 PROCEDURE — 80048 BASIC METABOLIC PNL TOTAL CA: CPT

## 2018-07-27 PROCEDURE — 770020 HCHG ROOM/CARE - TELE (206)

## 2018-07-27 PROCEDURE — A9270 NON-COVERED ITEM OR SERVICE: HCPCS | Performed by: HOSPITALIST

## 2018-07-27 PROCEDURE — 700102 HCHG RX REV CODE 250 W/ 637 OVERRIDE(OP): Performed by: INTERNAL MEDICINE

## 2018-07-27 PROCEDURE — 700101 HCHG RX REV CODE 250: Performed by: HOSPITALIST

## 2018-07-27 RX ORDER — HYDROMORPHONE HYDROCHLORIDE 2 MG/ML
.5-1 INJECTION, SOLUTION INTRAMUSCULAR; INTRAVENOUS; SUBCUTANEOUS EVERY 4 HOURS PRN
Status: DISCONTINUED | OUTPATIENT
Start: 2018-07-27 | End: 2018-07-28

## 2018-07-27 RX ORDER — OXYCODONE HYDROCHLORIDE 5 MG/1
15 TABLET ORAL EVERY 4 HOURS PRN
Status: DISCONTINUED | OUTPATIENT
Start: 2018-07-27 | End: 2018-07-28

## 2018-07-27 RX ORDER — HYDROMORPHONE HYDROCHLORIDE 2 MG/ML
1 INJECTION, SOLUTION INTRAMUSCULAR; INTRAVENOUS; SUBCUTANEOUS EVERY 4 HOURS PRN
Status: DISCONTINUED | OUTPATIENT
Start: 2018-07-27 | End: 2018-07-27

## 2018-07-27 RX ADMIN — SODIUM CHLORIDE: 900 INJECTION, SOLUTION INTRAVENOUS at 12:21

## 2018-07-27 RX ADMIN — METRONIDAZOLE 500 MG: 500 INJECTION, SOLUTION INTRAVENOUS at 06:12

## 2018-07-27 RX ADMIN — FAMOTIDINE 20 MG: 10 INJECTION, SOLUTION INTRAVENOUS at 05:32

## 2018-07-27 RX ADMIN — SODIUM CHLORIDE: 900 INJECTION, SOLUTION INTRAVENOUS at 22:25

## 2018-07-27 RX ADMIN — CEFTRIAXONE SODIUM 2 G: 2 INJECTION, POWDER, FOR SOLUTION INTRAMUSCULAR; INTRAVENOUS at 05:30

## 2018-07-27 RX ADMIN — HYDROMORPHONE HYDROCHLORIDE 0.5 MG: 2 INJECTION INTRAMUSCULAR; INTRAVENOUS; SUBCUTANEOUS at 09:42

## 2018-07-27 RX ADMIN — METRONIDAZOLE 500 MG: 500 INJECTION, SOLUTION INTRAVENOUS at 13:30

## 2018-07-27 RX ADMIN — OXYCODONE HYDROCHLORIDE 15 MG: 5 TABLET ORAL at 17:37

## 2018-07-27 RX ADMIN — HYDROMORPHONE HYDROCHLORIDE 0.5 MG: 2 INJECTION INTRAMUSCULAR; INTRAVENOUS; SUBCUTANEOUS at 05:32

## 2018-07-27 RX ADMIN — ONDANSETRON 4 MG: 2 INJECTION, SOLUTION INTRAMUSCULAR; INTRAVENOUS at 01:29

## 2018-07-27 RX ADMIN — HYDROMORPHONE HYDROCHLORIDE 0.5 MG: 2 INJECTION INTRAMUSCULAR; INTRAVENOUS; SUBCUTANEOUS at 18:17

## 2018-07-27 RX ADMIN — METRONIDAZOLE 500 MG: 500 INJECTION, SOLUTION INTRAVENOUS at 22:18

## 2018-07-27 RX ADMIN — ONDANSETRON 4 MG: 2 INJECTION, SOLUTION INTRAMUSCULAR; INTRAVENOUS at 14:02

## 2018-07-27 RX ADMIN — SODIUM CHLORIDE: 900 INJECTION, SOLUTION INTRAVENOUS at 01:22

## 2018-07-27 RX ADMIN — ONDANSETRON 4 MG: 2 INJECTION, SOLUTION INTRAMUSCULAR; INTRAVENOUS at 08:54

## 2018-07-27 RX ADMIN — ONDANSETRON 4 MG: 2 INJECTION, SOLUTION INTRAMUSCULAR; INTRAVENOUS at 18:17

## 2018-07-27 RX ADMIN — HYDROMORPHONE HYDROCHLORIDE 0.5 MG: 2 INJECTION INTRAMUSCULAR; INTRAVENOUS; SUBCUTANEOUS at 22:18

## 2018-07-27 RX ADMIN — ONDANSETRON 4 MG: 2 INJECTION, SOLUTION INTRAMUSCULAR; INTRAVENOUS at 22:18

## 2018-07-27 RX ADMIN — HYDROMORPHONE HYDROCHLORIDE 0.5 MG: 2 INJECTION INTRAMUSCULAR; INTRAVENOUS; SUBCUTANEOUS at 14:02

## 2018-07-27 RX ADMIN — FAMOTIDINE 20 MG: 20 TABLET ORAL at 17:37

## 2018-07-27 RX ADMIN — HYDROMORPHONE HYDROCHLORIDE 0.5 MG: 2 INJECTION INTRAMUSCULAR; INTRAVENOUS; SUBCUTANEOUS at 01:29

## 2018-07-27 RX ADMIN — OXYCODONE HYDROCHLORIDE 15 MG: 5 TABLET ORAL at 13:30

## 2018-07-27 ASSESSMENT — ENCOUNTER SYMPTOMS
COUGH: 0
HEADACHES: 0
CHILLS: 0
FEVER: 0
WEAKNESS: 0
SHORTNESS OF BREATH: 0
DIZZINESS: 0
PALPITATIONS: 0
SPUTUM PRODUCTION: 0
VOMITING: 0
ABDOMINAL PAIN: 1
LOSS OF CONSCIOUSNESS: 0
DIARRHEA: 0
CONSTIPATION: 0
FALLS: 0
STRIDOR: 0
MYALGIAS: 0
NAUSEA: 0
DEPRESSION: 0
TINGLING: 0

## 2018-07-27 ASSESSMENT — PAIN SCALES - GENERAL
PAINLEVEL_OUTOF10: 6
PAINLEVEL_OUTOF10: 8
PAINLEVEL_OUTOF10: 5
PAINLEVEL_OUTOF10: 8
PAINLEVEL_OUTOF10: 7

## 2018-07-27 ASSESSMENT — PATIENT HEALTH QUESTIONNAIRE - PHQ9
SUM OF ALL RESPONSES TO PHQ9 QUESTIONS 1 AND 2: 0
2. FEELING DOWN, DEPRESSED, IRRITABLE, OR HOPELESS: NOT AT ALL
2. FEELING DOWN, DEPRESSED, IRRITABLE, OR HOPELESS: NOT AT ALL
SUM OF ALL RESPONSES TO PHQ9 QUESTIONS 1 AND 2: 0
1. LITTLE INTEREST OR PLEASURE IN DOING THINGS: NOT AT ALL
1. LITTLE INTEREST OR PLEASURE IN DOING THINGS: NOT AT ALL

## 2018-07-27 NOTE — FLOWSHEET NOTE
07/26/18 2010   Type of Assessment   Assessment Yes   Patient History   Pulmonary Diagnosis none   Surgical Procedures none   Home O2 No   Home Treatments/Frequency No   COPD Risk Screening   Do you have a history of COPD? No   COPD Population Screener   During the past 4 weeks, how much did you feel short of breath? 0   Do you ever cough up any mucus or phlegm? 0   In the past 12 months, you do less than you used to because of your breathing problems 0   Have you smoked at least 100 cigarettes in your entire life? 0   How old are you? 2   COPD Screening Score 2   Smoking History   Smoking Cessation Offered Patient Counseled   Level Of Consciousness   Level of Consciousness Alert   Chest Exam   Respiration 16   Pulse 71   Oximetry   #Pulse Oximetry (Single Determination) Yes   Oxygen   Home O2 Use Prior To Admission? No   Pulse Oximetry 93 %   O2 Daily Delivery Respiratory  Room Air with O2 Available   Room Air Challenge Pass

## 2018-07-27 NOTE — H&P
Hospital Medicine History & Physical Note    Date of Service  7/26/2018    Primary Care Physician  Pcp Not In Computer    Consultants  None     Code Status  Full code    Chief Complaint  Abdominal pain.     History of Presenting Illness  63 y.o. female who presented 7/26/2018 with pmh of chronic pancreatitis pancreatic cyst, dm, is coming today due to abdominal pain, patient came to ER on 7/22 for same symptoms had ct that showed stable cyst, possible colitis, patient's labs were unremarkable, she was d/c back home with oral zofran, as per patient she did ok for 1 day and then she started having more pain in her abdomen and nausea and vomiting patient has diarrhea no bloody, patient c/o low grade fever at home along with chills, patient decided to come back to ER today her WBC is mildly elevated now, VS stable, patient continued having N/V and she can't keep anything down, ERP discussed case with GI and recommended conservative management, patient has not been able to eat with minimal fluid intake, food increases her pain, pain is diffuse in her abdomen, constant sharp, no radiated, no decreasing factors, 8/10 in severity, patient denies any chest pain, sob, palpitation, no focal weakness, no vision problems.   Patient will be started on iv atb ivf, iv pain medications.     Review of Systems  Review of Systems   Constitutional: Positive for chills and fever.   HENT: Negative for congestion and sinus pain.    Eyes: Negative for blurred vision and redness.   Respiratory: Negative for cough and wheezing.    Cardiovascular: Negative for chest pain and PND.   Gastrointestinal: Positive for abdominal pain, diarrhea, nausea and vomiting. Negative for heartburn and melena.   Genitourinary: Negative for dysuria and flank pain.   Musculoskeletal: Negative for myalgias and neck pain.   Skin: Negative for itching.   Neurological: Negative for dizziness, weakness and headaches.   Endo/Heme/Allergies: Does not bruise/bleed  easily.   Psychiatric/Behavioral: Negative for depression and suicidal ideas.       Past Medical History   has a past medical history of Angina; Arthritis; ASTHMA; Back pain; Bowel habit changes; Bronchitis (1995); Cancer (HCC); Cataract; COPD; Dental disorder; Fall; Heart burn; Heart murmur; Hepatitis A (1980's); Hiatus hernia syndrome; Hypertension; Macular degeneration; Other specified symptom associated with female genital organs; Pain; PAIN DISORDER; Panic attack; Personal history of venous thrombosis and embolism; Pneumonia (1995); Psychiatric problem; and Snoring. She also has no past medical history of CAD (coronary artery disease); CATARACT; Liver disease; or Psychiatric disorder.    Surgical History   has a past surgical history that includes hysterectomy, total abdominal (1975); tonsillectomy (1957); primary c section (1972, 1973); colon resection (1983); cataract extraction with iol (2013); eye surgery (Bilateral, 1950's); laparotomy (1977); laparoscopy (1980's); gastroscopy-endo (N/A, 4/8/2016); egd w/endoscopic ultrasound (N/A, 4/8/2016); and egd with asp/bx (N/A, 4/8/2016).     Family History  family history is not on file.     Social History   reports that she quit smoking about 15 months ago. Her smoking use included Cigarettes. She started smoking about 40 years ago. She has a 16.00 pack-year smoking history. She has never used smokeless tobacco. She reports that she does not drink alcohol or use drugs.    Allergies  Allergies   Allergen Reactions   • Iodine Hives     IVP dye   • Tape Itching     Peels skin off  Paper tape ok, if it is not on to long.       Medications  Prior to Admission Medications   Prescriptions Last Dose Informant Patient Reported? Taking?   aspirin (ASA) 325 MG Tab 7/22/2018 at 0200 Patient Yes No   Sig: Take 325 mg by mouth every four hours as needed for Mild Pain or Inflammation.   aspirin 81 MG tablet 7/26/2018 at AM Patient Yes No   Sig: Take 81 mg by mouth every day.    cyclobenzaprine (FLEXERIL) 10 MG Tab 7/26/2018 at AM Patient Yes No   Sig: Take 10 mg by mouth 3 times a day.   ondansetron (ZOFRAN ODT) 4 MG TABLET DISPERSIBLE 7/26/2018 at AM Patient No No   Sig: Take 1 Tab by mouth every 8 hours as needed.   oxycodone (OXY-IR) 15 MG immediate release tablet 7/24/2018 at PRN Patient Yes No   Sig: Take 15 mg by mouth every four hours as needed for Severe Pain.      Facility-Administered Medications: None       Physical Exam  Blood Pressure : 138/77   Temperature: 37.5 °C (99.5 °F)   Pulse: (!) 103   Respiration: 20   Pulse Oximetry: 98 %     Physical Exam   Constitutional: She is oriented to person, place, and time. She appears well-nourished. No distress.   HENT:   Head: Normocephalic and atraumatic.   Mouth/Throat: No oropharyngeal exudate.   Eyes: Conjunctivae are normal. Right eye exhibits no discharge. Left eye exhibits no discharge. No scleral icterus.   Neck: Normal range of motion. Neck supple. No JVD present.   Cardiovascular: Regular rhythm and normal heart sounds.  Exam reveals no friction rub.    Pulmonary/Chest: Effort normal and breath sounds normal. No respiratory distress. She has no wheezes. She has no rales.   Abdominal: She exhibits distension. There is tenderness. There is no rebound and no guarding.   Musculoskeletal: Normal range of motion. She exhibits no edema.   Lymphadenopathy:     She has no cervical adenopathy.   Neurological: She is alert and oriented to person, place, and time. She exhibits normal muscle tone.   Skin: No erythema.   Psychiatric: She has a normal mood and affect.   Nursing note and vitals reviewed.      Laboratory:  Recent Labs      07/26/18   1205   WBC  11.6*   RBC  5.41*   HEMOGLOBIN  15.8   HEMATOCRIT  47.1*   MCV  87.1   MCH  29.2   MCHC  33.5*   RDW  43.1   PLATELETCT  322   MPV  9.1     Recent Labs      07/26/18   1205   SODIUM  139   POTASSIUM  3.5*   CHLORIDE  110   CO2  17*   GLUCOSE  89   BUN  10   CREATININE  0.78    CALCIUM  8.9     Recent Labs      07/26/18   1205   ALTSGPT  9   ASTSGOT  13   ALKPHOSPHAT  75   TBILIRUBIN  0.3   LIPASE  17   GLUCOSE  89                 Lab Results   Component Value Date    TROPONINI <0.02 07/26/2018       Urinalysis:    Lab Results   Component Value Date    SPECGRAVITY <=1.005 07/26/2018    GLUCOSEUR 100 (A) 07/26/2018    KETONES Negative 07/26/2018    NITRITE Negative 07/26/2018    WBCURINE 2-5 06/03/2017    RBCURINE 2-5 (A) 06/03/2017    BACTERIA Rare (A) 06/03/2017    EPITHELCELL Rare 06/03/2017        Imaging:  DX-ABDOMEN COMPLETE WITH AP OR PA CXR   Final Result      Normal chest, abdomen and pelvis radiographs            Assessment/Plan:  I anticipate this patient will require at least two midnights for appropriate medical management, necessitating inpatient admission.    * Abdominal pain- (present on admission)   Assessment & Plan    Likely due to colitis and chronic pancreatitis, patient failed outpatient treatment, will start iv atb, f/u cx, NPO, ivf, will check ESR, crp.         Metabolic acidosis- (present on admission)   Assessment & Plan    Likely due to dehydration and starvation. Will f/u.         Hypokalemia- (present on admission)   Assessment & Plan    Will replace iv.         Chronic pancreatitis (HCC)- (present on admission)   Assessment & Plan    Lipase is normal, although this is common in chronic pancreatitis. Continue pain controlled, ct on 7/22 showed stable cyst no pancreatic inflammation.         Intractable nausea and vomiting- (present on admission)   Assessment & Plan    Patient failed outpatient treatment, this most likely due to colitis and chronic pancreatitis. Abdominal xray did not show obstruction, patient is having diarrhea and passing gas.         Tachycardia- (present on admission)   Assessment & Plan    Probably due to dehydration. Will continue monitoring.             VTE prophylaxis: scd's.

## 2018-07-27 NOTE — PROGRESS NOTES
Received report from Nohemi DARNELL. Discussed plan of care and safety measures in place. No further needs at this time.

## 2018-07-27 NOTE — PROGRESS NOTES
Pt is A&Ox4, resting comfortably in bed. Assessment completed and pain level 6/10. Due medication have been given. Bed is in lowest postion, and side rails up x2, pt calls when needs assistance and call light within reach.

## 2018-07-27 NOTE — PROGRESS NOTES
Pt arrived from ER with RN placed on telemoitor. Pt states pain of 7/10 in abdomen unrealived from morphine given at 1700 in ER dr boyce per pts request, awaiting call back. Pt oriened to floor and room, safety and fall precautions in place, call light in reach, will continue to monitor

## 2018-07-27 NOTE — PROGRESS NOTES
[facility] Medicine Daily Progress Note    Date of Service  7/27/2018    Chief Complaint  63 y.o. female admitted 7/26/2018 with abdominal pain.    Hospital Course   Patient does have a history of chronic pancreatitis with pseudocyst.  Patient has had similar pain recently, diagnosed with pancreatitis.  CT abdomen obtained on 7/22 that again showed the cysts as well as possible colitis.  Patient was sent home from the emergency department at that time.  Patient states the pain worsened so she came back.  Discussed plan and patient condition with staff including case management, charge nurse, pharmacy during rounds as well as bedside nurse during bedside rounds.    Interval Problem Update  Pain is improved but patient states persistent.    Consultants/Specialty  None    Disposition  Patient requires additional treatment in the hospital, should be able to go home at the time of discharge    Review of Systems  Review of Systems   Constitutional: Positive for malaise/fatigue. Negative for chills and fever.   HENT: Negative for congestion.    Respiratory: Negative for cough, sputum production, shortness of breath and stridor.    Cardiovascular: Negative for chest pain, palpitations and leg swelling.   Gastrointestinal: Positive for abdominal pain. Negative for constipation, diarrhea, nausea and vomiting.   Genitourinary: Negative for dysuria and urgency.   Musculoskeletal: Negative for falls and myalgias.   Neurological: Negative for dizziness, tingling, loss of consciousness, weakness and headaches.   Psychiatric/Behavioral: Negative for depression and suicidal ideas.   All other systems reviewed and are negative.       Physical Exam  Blood Pressure : 110/70   Temperature: 36.7 °C (98.1 °F)   Pulse: 67   Respiration: 16   Pulse Oximetry: 100 %     Physical Exam   Constitutional: She is oriented to person, place, and time. She appears well-developed. No distress.   HENT:   Head: Normocephalic and atraumatic.    Mouth/Throat: Oropharynx is clear and moist. No oropharyngeal exudate.   Eyes: Right eye exhibits no discharge. Left eye exhibits no discharge.   Neck: Neck supple. No tracheal deviation present.   Cardiovascular: Normal rate and regular rhythm.  Exam reveals no gallop and no friction rub.    No murmur heard.  Pulmonary/Chest: Effort normal and breath sounds normal. No stridor. No respiratory distress. She has no wheezes. She has no rales. She exhibits no tenderness.   Abdominal: Soft. Bowel sounds are normal. She exhibits no distension. There is tenderness.   Musculoskeletal: Normal range of motion. She exhibits no edema or tenderness.   Lymphadenopathy:     She has no cervical adenopathy.   Neurological: She is alert and oriented to person, place, and time. No cranial nerve deficit.   Skin: Skin is warm and dry. No rash noted. She is not diaphoretic. No erythema.   Psychiatric: Her behavior is normal. Judgment and thought content normal.   Odd affect    Nursing note and vitals reviewed.      Fluids    Intake/Output Summary (Last 24 hours) at 07/27/18 0737  Last data filed at 07/27/18 0600   Gross per 24 hour   Intake             1600 ml   Output                0 ml   Net             1600 ml       Laboratory  Recent Labs      07/26/18   1205  07/27/18   0433   WBC  11.6*  8.2   RBC  5.41*  4.52   HEMOGLOBIN  15.8  13.1   HEMATOCRIT  47.1*  40.1   MCV  87.1  88.7   MCH  29.2  29.0   MCHC  33.5*  32.7*   RDW  43.1  43.8   PLATELETCT  322  280   MPV  9.1  9.3     Recent Labs      07/26/18   1205  07/27/18   0433   SODIUM  139  140   POTASSIUM  3.5*  4.1   CHLORIDE  110  112   CO2  17*  23   GLUCOSE  89  90   BUN  10  8   CREATININE  0.78  0.76   CALCIUM  8.9  8.2*             Recent Labs      07/27/18   0433   TRIGLYCERIDE  185*   HDL  38*   LDL  129*       Imaging  DX-ABDOMEN COMPLETE WITH AP OR PA CXR   Final Result      Normal chest, abdomen and pelvis radiographs           Assessment/Plan  * Abdominal pain-  (present on admission)   Assessment & Plan    -Due to colitis, possibly due to chronic pancreatitis, atrophic pancreas  -Patient does seem slightly improved, will give trial of clears        Chronic pancreatitis (HCC)- (present on admission)   Assessment & Plan    -Associated with assist  -Okay to start clear liquid diet        Metabolic acidosis- (present on admission)   Assessment & Plan    -Resolved with IV fluids        Hypokalemia- (present on admission)   Assessment & Plan    -Resolved with supplementation        Intractable nausea and vomiting- (present on admission)   Assessment & Plan    -Symptomatic care, no longer intractable        Tachycardia- (present on admission)   Assessment & Plan    -Resolved

## 2018-07-27 NOTE — CARE PLAN
Problem: Nutritional:  Goal: Achieve adequate nutritional intake  Diet advancement beyond clear liquids + PO intake >/= 50% of meals  Outcome: NOT MET

## 2018-07-27 NOTE — CARE PLAN
Problem: Safety  Goal: Will remain free from falls  Outcome: PROGRESSING AS EXPECTED  Discussed with patient about preventing falls. Pt understood and uses call light appropriately.     Problem: Pain Management  Goal: Pain level will decrease to patient's comfort goal    Intervention: Educate and implement non-pharmacologic comfort measures. Examples: relaxation, distration, play therapy, activity therapy, massage, etc.  Discussed ways to help with pain management.

## 2018-07-27 NOTE — DIETARY
"Nutrition services: Day 1 of admit.  Antonieta Addison is a 63 y.o. female with admitting DX of chronic pancreatitis, colitis, and tachycardia.    Per review of chart the pt was found to have poor PO PTA as well as BMI <19 (=17.8), which is underweight for her age. Pt recently came to ER with abdominal pain and was d/c home with antiemetics, however, her pain continued and she developed severe N/V and has been unable to tolerate PO food or liquids. Pt is currently NPO x1 day. Will monitor for diet advancement and adequate intake of meals, and provide additional nutrition interventions as appropriate.    Assessment:  Height: 149.9 cm (4' 11.02\")  Weight: 40 kg (88 lb 2.9 oz)  Body mass index is 17.8 kg/m² - underweight  Diet/Intake: NPO x1 day    Evaluation:   Labs: Calcium 8.2 (L), Total Cholesterol 204 (H),  (H), HDL 38 (L),  (H)  Meds: Rocephin, Pepcid, SSI, Flagyl, Pericolace, (PRN: Dilaudid, Zofran, Miralax, MOM, Dulcolax)  Fluids: NS @ 100mL/hr  Skin: Intact - no skin breakdown noted  GI: last BM on 7/26 x3  I/O's: None recorded yet today    Malnutrition Risk: Hx of chronic pancreatitis, periods of poor oral nutrition during flare ups, low BMI 17.8.    Recommendations/Plan:  1. Diet advancement when medically feasible.   2. Monitor weight.  3. Once diet is advanced, nutrition rep to see patient for ongoing meal and snack preferences.     RD monitoring.          "

## 2018-07-28 PROCEDURE — A9270 NON-COVERED ITEM OR SERVICE: HCPCS | Performed by: INTERNAL MEDICINE

## 2018-07-28 PROCEDURE — 700111 HCHG RX REV CODE 636 W/ 250 OVERRIDE (IP): Performed by: FAMILY MEDICINE

## 2018-07-28 PROCEDURE — 700111 HCHG RX REV CODE 636 W/ 250 OVERRIDE (IP): Performed by: HOSPITALIST

## 2018-07-28 PROCEDURE — 99231 SBSQ HOSP IP/OBS SF/LOW 25: CPT | Performed by: INTERNAL MEDICINE

## 2018-07-28 PROCEDURE — 700111 HCHG RX REV CODE 636 W/ 250 OVERRIDE (IP): Performed by: INTERNAL MEDICINE

## 2018-07-28 PROCEDURE — 770020 HCHG ROOM/CARE - TELE (206)

## 2018-07-28 PROCEDURE — 700105 HCHG RX REV CODE 258: Performed by: HOSPITALIST

## 2018-07-28 PROCEDURE — A9270 NON-COVERED ITEM OR SERVICE: HCPCS | Performed by: HOSPITALIST

## 2018-07-28 PROCEDURE — 700102 HCHG RX REV CODE 250 W/ 637 OVERRIDE(OP): Performed by: INTERNAL MEDICINE

## 2018-07-28 PROCEDURE — 700101 HCHG RX REV CODE 250: Performed by: HOSPITALIST

## 2018-07-28 PROCEDURE — 700102 HCHG RX REV CODE 250 W/ 637 OVERRIDE(OP): Performed by: HOSPITALIST

## 2018-07-28 RX ORDER — HYDROMORPHONE HYDROCHLORIDE 2 MG/ML
2 INJECTION, SOLUTION INTRAMUSCULAR; INTRAVENOUS; SUBCUTANEOUS EVERY 4 HOURS PRN
Status: DISCONTINUED | OUTPATIENT
Start: 2018-07-28 | End: 2018-07-30 | Stop reason: HOSPADM

## 2018-07-28 RX ORDER — OXYCODONE HYDROCHLORIDE 10 MG/1
20 TABLET ORAL EVERY 4 HOURS PRN
Status: DISCONTINUED | OUTPATIENT
Start: 2018-07-28 | End: 2018-07-30 | Stop reason: HOSPADM

## 2018-07-28 RX ORDER — LORAZEPAM 2 MG/ML
0.5 INJECTION INTRAMUSCULAR
Status: DISCONTINUED | OUTPATIENT
Start: 2018-07-28 | End: 2018-07-30 | Stop reason: HOSPADM

## 2018-07-28 RX ADMIN — HYDROMORPHONE HYDROCHLORIDE 0.5 MG: 2 INJECTION INTRAMUSCULAR; INTRAVENOUS; SUBCUTANEOUS at 02:35

## 2018-07-28 RX ADMIN — ONDANSETRON 4 MG: 4 TABLET, ORALLY DISINTEGRATING ORAL at 10:58

## 2018-07-28 RX ADMIN — METRONIDAZOLE 500 MG: 500 INJECTION, SOLUTION INTRAVENOUS at 06:24

## 2018-07-28 RX ADMIN — FAMOTIDINE 20 MG: 20 TABLET ORAL at 18:22

## 2018-07-28 RX ADMIN — HYDROMORPHONE HYDROCHLORIDE 2 MG: 2 INJECTION, SOLUTION INTRAMUSCULAR; INTRAVENOUS; SUBCUTANEOUS at 15:29

## 2018-07-28 RX ADMIN — ONDANSETRON 4 MG: 2 INJECTION, SOLUTION INTRAMUSCULAR; INTRAVENOUS at 21:48

## 2018-07-28 RX ADMIN — HYDROMORPHONE HYDROCHLORIDE 0.5 MG: 2 INJECTION INTRAMUSCULAR; INTRAVENOUS; SUBCUTANEOUS at 06:39

## 2018-07-28 RX ADMIN — SODIUM CHLORIDE: 900 INJECTION, SOLUTION INTRAVENOUS at 11:04

## 2018-07-28 RX ADMIN — ONDANSETRON 4 MG: 2 INJECTION, SOLUTION INTRAMUSCULAR; INTRAVENOUS at 02:35

## 2018-07-28 RX ADMIN — HYDROMORPHONE HYDROCHLORIDE 1 MG: 2 INJECTION INTRAMUSCULAR; INTRAVENOUS; SUBCUTANEOUS at 10:58

## 2018-07-28 RX ADMIN — OXYCODONE HYDROCHLORIDE 15 MG: 5 TABLET ORAL at 00:24

## 2018-07-28 RX ADMIN — SODIUM CHLORIDE: 900 INJECTION, SOLUTION INTRAVENOUS at 20:05

## 2018-07-28 RX ADMIN — OXYCODONE HYDROCHLORIDE 20 MG: 10 TABLET ORAL at 14:09

## 2018-07-28 RX ADMIN — OXYCODONE HYDROCHLORIDE 15 MG: 5 TABLET ORAL at 09:18

## 2018-07-28 RX ADMIN — OXYCODONE HYDROCHLORIDE 15 MG: 5 TABLET ORAL at 05:18

## 2018-07-28 RX ADMIN — HYDROMORPHONE HYDROCHLORIDE 2 MG: 2 INJECTION, SOLUTION INTRAMUSCULAR; INTRAVENOUS; SUBCUTANEOUS at 19:53

## 2018-07-28 RX ADMIN — OXYCODONE HYDROCHLORIDE 20 MG: 10 TABLET ORAL at 22:47

## 2018-07-28 RX ADMIN — METRONIDAZOLE 500 MG: 500 INJECTION, SOLUTION INTRAVENOUS at 21:44

## 2018-07-28 RX ADMIN — ASPIRIN 81 MG: 81 TABLET, COATED ORAL at 05:18

## 2018-07-28 RX ADMIN — METRONIDAZOLE 500 MG: 500 INJECTION, SOLUTION INTRAVENOUS at 14:09

## 2018-07-28 RX ADMIN — OXYCODONE HYDROCHLORIDE 20 MG: 10 TABLET ORAL at 18:22

## 2018-07-28 RX ADMIN — CEFTRIAXONE SODIUM 2 G: 2 INJECTION, POWDER, FOR SOLUTION INTRAMUSCULAR; INTRAVENOUS at 05:18

## 2018-07-28 RX ADMIN — LORAZEPAM 0.5 MG: 2 INJECTION INTRAMUSCULAR; INTRAVENOUS at 15:48

## 2018-07-28 RX ADMIN — FAMOTIDINE 20 MG: 20 TABLET ORAL at 05:18

## 2018-07-28 ASSESSMENT — PAIN SCALES - GENERAL
PAINLEVEL_OUTOF10: 7
PAINLEVEL_OUTOF10: 8
PAINLEVEL_OUTOF10: 6

## 2018-07-28 ASSESSMENT — ENCOUNTER SYMPTOMS
STRIDOR: 0
ABDOMINAL PAIN: 1
NAUSEA: 1
LOSS OF CONSCIOUSNESS: 0
CHILLS: 0
MYALGIAS: 0
VOMITING: 1
HEADACHES: 0
FEVER: 0
NECK PAIN: 0
PALPITATIONS: 0
SPUTUM PRODUCTION: 0
DEPRESSION: 0
DIZZINESS: 0
FALLS: 0
SHORTNESS OF BREATH: 0

## 2018-07-28 ASSESSMENT — PATIENT HEALTH QUESTIONNAIRE - PHQ9
1. LITTLE INTEREST OR PLEASURE IN DOING THINGS: NOT AT ALL
SUM OF ALL RESPONSES TO PHQ9 QUESTIONS 1 AND 2: 0
2. FEELING DOWN, DEPRESSED, IRRITABLE, OR HOPELESS: NOT AT ALL

## 2018-07-28 NOTE — CARE PLAN
Problem: Communication  Goal: The ability to communicate needs accurately and effectively will improve  Outcome: PROGRESSING AS EXPECTED  Patient communicates needs clearly and effectively

## 2018-07-28 NOTE — PROGRESS NOTES
Pt is A&Ox4, resting comfortably in bed. Assessment completed and pain level 7/10. Due medication have been given. Bed is in lowest postion, and side rails up x2, pt calls when needs assistance and call light within reach.    Pain medication given per MAR.

## 2018-07-28 NOTE — PROGRESS NOTES
Received report from Leo DARNELL. Discussed plan of care and safety measures in place. No further needs at this time.

## 2018-07-28 NOTE — PROGRESS NOTES
[facility] Medicine Daily Progress Note    Date of Service  7/28/2018    Chief Complaint  63 y.o. female admitted 7/26/2018 with abdominal pain.    Hospital Course   Patient does have a history of chronic pancreatitis with pseudocyst.  Patient has had similar pain recently, diagnosed with pancreatitis.  CT abdomen obtained on 7/22 that again showed the cysts as well as possible colitis.  Patient was sent home from the emergency department at that time.  Patient states the pain worsened so she came back.  Discussed plan and patient condition with staff including case management, charge nurse, pharmacy during rounds as well as bedside nurse during bedside rounds.    Interval Problem Update  Pain is improved but patient states persistent.  Also with nausea and vomiting    Consultants/Specialty  None    Disposition  Patient requires additional treatment in the hospital, should be able to go home at the time of discharge    Review of Systems  Review of Systems   Constitutional: Positive for malaise/fatigue. Negative for chills and fever.   HENT: Negative for congestion and hearing loss.    Respiratory: Negative for sputum production, shortness of breath and stridor.    Cardiovascular: Negative for chest pain and palpitations.   Gastrointestinal: Positive for abdominal pain, nausea and vomiting.   Genitourinary: Negative for dysuria, frequency and urgency.   Musculoskeletal: Negative for falls, myalgias and neck pain.   Neurological: Negative for dizziness, loss of consciousness and headaches.   Psychiatric/Behavioral: Negative for depression and suicidal ideas.   All other systems reviewed and are negative.       Physical Exam  Blood Pressure : 110/70   Temperature: 36.7 °C (98.1 °F)   Pulse: 67   Respiration: 16   Pulse Oximetry: 100 %     Physical Exam   Constitutional: She is oriented to person, place, and time. No distress.   HENT:   Head: Normocephalic and atraumatic.   Mouth/Throat: No oropharyngeal exudate.   Eyes:  Right eye exhibits no discharge. Left eye exhibits no discharge. No scleral icterus.   Neck: Neck supple.   Cardiovascular: Normal rate and regular rhythm.    No murmur heard.  Pulmonary/Chest: Effort normal and breath sounds normal. No stridor. No respiratory distress. She has no wheezes. She exhibits no tenderness.   Abdominal: Bowel sounds are normal. She exhibits no distension. There is tenderness.   Musculoskeletal: She exhibits no edema or tenderness.   Lymphadenopathy:     She has no cervical adenopathy.   Neurological: She is alert and oriented to person, place, and time.   Skin: Skin is warm and dry. She is not diaphoretic. No erythema.   Psychiatric: Judgment and thought content normal.   Odd affect    Nursing note and vitals reviewed.      Fluids    Intake/Output Summary (Last 24 hours) at 07/28/18 1204  Last data filed at 07/28/18 0800   Gross per 24 hour   Intake              200 ml   Output                0 ml   Net              200 ml       Laboratory  Recent Labs      07/26/18   1205  07/27/18   0433   WBC  11.6*  8.2   RBC  5.41*  4.52   HEMOGLOBIN  15.8  13.1   HEMATOCRIT  47.1*  40.1   MCV  87.1  88.7   MCH  29.2  29.0   MCHC  33.5*  32.7*   RDW  43.1  43.8   PLATELETCT  322  280   MPV  9.1  9.3     Recent Labs      07/26/18   1205  07/27/18   0433   SODIUM  139  140   POTASSIUM  3.5*  4.1   CHLORIDE  110  112   CO2  17*  23   GLUCOSE  89  90   BUN  10  8   CREATININE  0.78  0.76   CALCIUM  8.9  8.2*             Recent Labs      07/27/18   0433   TRIGLYCERIDE  185*   HDL  38*   LDL  129*       Imaging  DX-ABDOMEN COMPLETE WITH AP OR PA CXR   Final Result      Normal chest, abdomen and pelvis radiographs           Assessment/Plan  * Abdominal pain- (present on admission)   Assessment & Plan    -Due to colitis, possibly due to chronic pancreatitis, atrophic pancreas  -Was improved since starting clears however now with nausea and vomiting  -Nausea and vomiting possibly medication related, persists  though will go back to strict n.p.o. however patient has been eating much        Chronic pancreatitis (HCC)- (present on admission)   Assessment & Plan    -Associated with a cyst  -Continue clear liquid diet for now        Metabolic acidosis- (present on admission)   Assessment & Plan    -Resolved with IV fluids        Hypokalemia- (present on admission)   Assessment & Plan    -Resolved with supplementation        Intractable nausea and vomiting- (present on admission)   Assessment & Plan    -Again, if persists will consider making patient n.p.o.  -Symptomatic care        Tachycardia- (present on admission)   Assessment & Plan    -Resolved

## 2018-07-28 NOTE — CARE PLAN
Problem: Pain Management  Goal: Pain level will decrease to patient's comfort goal  Outcome: PROGRESSING AS EXPECTED  Patient is on a high level of narcotics as of now to manage chronic pain

## 2018-07-28 NOTE — PROGRESS NOTES
Telemetry Summary:    Rhythm: SR/SB  Rate: 58-66  Ectopy: RPVC, OPAC  ME: 0.16  QRS: 0.08  QT: 0.42

## 2018-07-28 NOTE — CARE PLAN
Problem: Communication  Goal: The ability to communicate needs accurately and effectively will improve  Outcome: PROGRESSING AS EXPECTED  Discussed with patients about explaining pain level and when nausea occurs.     Problem: Infection  Goal: Will remain free from infection  Outcome: PROGRESSING AS EXPECTED  Discussed infection prevention with patient and the importance of hand hygiene.

## 2018-07-29 LAB
ANION GAP SERPL CALC-SCNC: 12 MMOL/L (ref 0–11.9)
BUN SERPL-MCNC: <5 MG/DL (ref 8–22)
CALCIUM SERPL-MCNC: 8.3 MG/DL (ref 8.4–10.2)
CHLORIDE SERPL-SCNC: 107 MMOL/L (ref 96–112)
CO2 SERPL-SCNC: 19 MMOL/L (ref 20–33)
CREAT SERPL-MCNC: 0.74 MG/DL (ref 0.5–1.4)
GLUCOSE SERPL-MCNC: 76 MG/DL (ref 65–99)
POTASSIUM SERPL-SCNC: 3.6 MMOL/L (ref 3.6–5.5)
SODIUM SERPL-SCNC: 138 MMOL/L (ref 135–145)

## 2018-07-29 PROCEDURE — 99233 SBSQ HOSP IP/OBS HIGH 50: CPT | Performed by: INTERNAL MEDICINE

## 2018-07-29 PROCEDURE — 700111 HCHG RX REV CODE 636 W/ 250 OVERRIDE (IP): Performed by: HOSPITALIST

## 2018-07-29 PROCEDURE — 700105 HCHG RX REV CODE 258: Performed by: HOSPITALIST

## 2018-07-29 PROCEDURE — A9270 NON-COVERED ITEM OR SERVICE: HCPCS | Performed by: INTERNAL MEDICINE

## 2018-07-29 PROCEDURE — 700102 HCHG RX REV CODE 250 W/ 637 OVERRIDE(OP): Performed by: INTERNAL MEDICINE

## 2018-07-29 PROCEDURE — 700111 HCHG RX REV CODE 636 W/ 250 OVERRIDE (IP): Performed by: INTERNAL MEDICINE

## 2018-07-29 PROCEDURE — 700101 HCHG RX REV CODE 250: Performed by: HOSPITALIST

## 2018-07-29 PROCEDURE — 80048 BASIC METABOLIC PNL TOTAL CA: CPT

## 2018-07-29 PROCEDURE — 770020 HCHG ROOM/CARE - TELE (206)

## 2018-07-29 RX ORDER — BUTALBITAL, ACETAMINOPHEN AND CAFFEINE 50; 325; 40 MG/1; MG/1; MG/1
1 TABLET ORAL EVERY 6 HOURS PRN
Status: DISCONTINUED | OUTPATIENT
Start: 2018-07-29 | End: 2018-07-30 | Stop reason: HOSPADM

## 2018-07-29 RX ORDER — PROCHLORPERAZINE MALEATE 10 MG
10 TABLET ORAL EVERY 6 HOURS PRN
Status: DISCONTINUED | OUTPATIENT
Start: 2018-07-29 | End: 2018-07-30 | Stop reason: HOSPADM

## 2018-07-29 RX ADMIN — HYDROMORPHONE HYDROCHLORIDE 2 MG: 2 INJECTION, SOLUTION INTRAMUSCULAR; INTRAVENOUS; SUBCUTANEOUS at 20:00

## 2018-07-29 RX ADMIN — METRONIDAZOLE 500 MG: 500 INJECTION, SOLUTION INTRAVENOUS at 04:58

## 2018-07-29 RX ADMIN — FAMOTIDINE 20 MG: 10 INJECTION, SOLUTION INTRAVENOUS at 04:56

## 2018-07-29 RX ADMIN — SODIUM CHLORIDE: 900 INJECTION, SOLUTION INTRAVENOUS at 11:05

## 2018-07-29 RX ADMIN — SODIUM CHLORIDE: 900 INJECTION, SOLUTION INTRAVENOUS at 21:29

## 2018-07-29 RX ADMIN — HYDROMORPHONE HYDROCHLORIDE 2 MG: 2 INJECTION, SOLUTION INTRAMUSCULAR; INTRAVENOUS; SUBCUTANEOUS at 15:18

## 2018-07-29 RX ADMIN — CEFTRIAXONE SODIUM 2 G: 2 INJECTION, POWDER, FOR SOLUTION INTRAMUSCULAR; INTRAVENOUS at 04:58

## 2018-07-29 RX ADMIN — HYDROMORPHONE HYDROCHLORIDE 2 MG: 2 INJECTION, SOLUTION INTRAMUSCULAR; INTRAVENOUS; SUBCUTANEOUS at 06:31

## 2018-07-29 RX ADMIN — BUTALBITAL, ACETAMINOPHEN, AND CAFFEINE 1 TABLET: 50; 325; 40 TABLET ORAL at 09:41

## 2018-07-29 RX ADMIN — OXYCODONE HYDROCHLORIDE 20 MG: 10 TABLET ORAL at 14:25

## 2018-07-29 RX ADMIN — ONDANSETRON 4 MG: 2 INJECTION, SOLUTION INTRAMUSCULAR; INTRAVENOUS at 04:55

## 2018-07-29 RX ADMIN — PROCHLORPERAZINE MALEATE 10 MG: 10 TABLET, FILM COATED ORAL at 20:00

## 2018-07-29 RX ADMIN — FAMOTIDINE 20 MG: 10 INJECTION, SOLUTION INTRAVENOUS at 17:51

## 2018-07-29 RX ADMIN — HYDROMORPHONE HYDROCHLORIDE 2 MG: 2 INJECTION, SOLUTION INTRAMUSCULAR; INTRAVENOUS; SUBCUTANEOUS at 23:48

## 2018-07-29 RX ADMIN — PROCHLORPERAZINE MALEATE 10 MG: 10 TABLET, FILM COATED ORAL at 09:42

## 2018-07-29 RX ADMIN — HYDROMORPHONE HYDROCHLORIDE 2 MG: 2 INJECTION, SOLUTION INTRAMUSCULAR; INTRAVENOUS; SUBCUTANEOUS at 02:23

## 2018-07-29 RX ADMIN — OXYCODONE HYDROCHLORIDE 20 MG: 10 TABLET ORAL at 18:36

## 2018-07-29 RX ADMIN — HYDROMORPHONE HYDROCHLORIDE 2 MG: 2 INJECTION, SOLUTION INTRAMUSCULAR; INTRAVENOUS; SUBCUTANEOUS at 11:05

## 2018-07-29 RX ADMIN — METRONIDAZOLE 500 MG: 500 INJECTION, SOLUTION INTRAVENOUS at 14:26

## 2018-07-29 RX ADMIN — METRONIDAZOLE 500 MG: 500 INJECTION, SOLUTION INTRAVENOUS at 21:29

## 2018-07-29 ASSESSMENT — PAIN SCALES - GENERAL
PAINLEVEL_OUTOF10: 4
PAINLEVEL_OUTOF10: 5
PAINLEVEL_OUTOF10: 8
PAINLEVEL_OUTOF10: 4
PAINLEVEL_OUTOF10: 4
PAINLEVEL_OUTOF10: 6
PAINLEVEL_OUTOF10: 8
PAINLEVEL_OUTOF10: 8
PAINLEVEL_OUTOF10: 6

## 2018-07-29 ASSESSMENT — ENCOUNTER SYMPTOMS
LOSS OF CONSCIOUSNESS: 0
CHILLS: 0
SPUTUM PRODUCTION: 0
MYALGIAS: 0
VOMITING: 1
FALLS: 0
FEVER: 0
DIZZINESS: 0
STRIDOR: 0
ABDOMINAL PAIN: 1
NECK PAIN: 0
PALPITATIONS: 0
NAUSEA: 1
SHORTNESS OF BREATH: 0
HEADACHES: 1
DEPRESSION: 0

## 2018-07-29 ASSESSMENT — PATIENT HEALTH QUESTIONNAIRE - PHQ9
SUM OF ALL RESPONSES TO PHQ9 QUESTIONS 1 AND 2: 0
2. FEELING DOWN, DEPRESSED, IRRITABLE, OR HOPELESS: NOT AT ALL
1. LITTLE INTEREST OR PLEASURE IN DOING THINGS: NOT AT ALL

## 2018-07-29 NOTE — CARE PLAN
Problem: Safety  Goal: Will remain free from injury  Outcome: PROGRESSING AS EXPECTED  Oriented to room and equipment. Call light instructions given, in reach at all times. Slipper socks in place. Floor dry and uncluttered. Bed locked and in lowest position.     Problem: Knowledge Deficit  Goal: Knowledge of disease process/condition, treatment plan, diagnostic tests, and medications will improve  Outcome: PROGRESSING AS EXPECTED  Plan of care discussed with patient. Opportunity given for questions. States understanding.     Problem: Pain Management  Goal: Pain level will decrease to patient's comfort goal  Outcome: PROGRESSING AS EXPECTED  Uses 0-10 scale appropriately. Pharmacologic and nonpharmacologic interventions in place.

## 2018-07-29 NOTE — PROGRESS NOTES
No complaints made by pt at this time, Pain level manageable after PRN pain meds given (See MARs), will continue to monitor.

## 2018-07-29 NOTE — PROGRESS NOTES
Received bedside report from nightshift MANN Norton. Plan of care discussed. Safety measures in place. Pt resting in bed, no complaints as of this time.

## 2018-07-29 NOTE — PROGRESS NOTES
Pt initial assessments done, A&O x 4 with spouse at bedside, complained of nausea, stated current nause medication ineffective, Spoke with DR. Hugo, Dr. Hugo put orders in, awaiting pharmacy to approve, interventions done for nausea, will continue monitor.

## 2018-07-29 NOTE — PROGRESS NOTES
[facility] Medicine Daily Progress Note    Date of Service  7/29/2018    Chief Complaint  63 y.o. female admitted 7/26/2018 with abdominal pain.    Hospital Course   Patient does have a history of chronic pancreatitis with pseudocyst.  Patient has had similar pain recently, diagnosed with pancreatitis.  CT abdomen obtained on 7/22 that again showed the cysts as well as possible colitis.  Patient was sent home from the emergency department at that time.  Patient states the pain worsened so she came back.  Discussed plan and patient condition with staff including case management, charge nurse, pharmacy during rounds as well as bedside nurse during bedside rounds.    Interval Problem Update  Pain is improved but patient states persistent.  Pt now with N/V, adding meds.  Discussed with family at bedside.    Consultants/Specialty  None    Disposition  Patient requires additional treatment in the hospital, should be able to go home at the time of discharge    Review of Systems  Review of Systems   Constitutional: Positive for malaise/fatigue. Negative for chills and fever.   HENT: Negative for congestion and hearing loss.    Respiratory: Negative for sputum production, shortness of breath and stridor.    Cardiovascular: Negative for chest pain and palpitations.   Gastrointestinal: Positive for abdominal pain, nausea and vomiting.   Genitourinary: Negative for dysuria, frequency and urgency.   Musculoskeletal: Negative for falls, myalgias and neck pain.   Neurological: Positive for headaches. Negative for dizziness and loss of consciousness.   Psychiatric/Behavioral: Negative for depression and suicidal ideas.   All other systems reviewed and are negative.       Physical Exam  Blood Pressure : 110/70   Temperature: 36.7 °C (98.1 °F)   Pulse: 67   Respiration: 16   Pulse Oximetry: 100 %     Physical Exam   Constitutional: She is oriented to person, place, and time. No distress.   HENT:   Head: Normocephalic and atraumatic.    Mouth/Throat: No oropharyngeal exudate.   Eyes: Right eye exhibits no discharge. Left eye exhibits no discharge. No scleral icterus.   Neck: Neck supple.   Cardiovascular: Normal rate and regular rhythm.    No murmur heard.  Pulmonary/Chest: Effort normal and breath sounds normal. No stridor. No respiratory distress. She has no wheezes. She exhibits no tenderness.   Abdominal: Bowel sounds are normal. She exhibits no distension. There is tenderness. There is guarding.   Musculoskeletal: She exhibits no edema or tenderness.   Lymphadenopathy:     She has no cervical adenopathy.   Neurological: She is alert and oriented to person, place, and time.   Skin: Skin is warm and dry. She is not diaphoretic. No erythema.   Psychiatric: Judgment and thought content normal.   Odd affect    Nursing note and vitals reviewed.      Fluids    Intake/Output Summary (Last 24 hours) at 07/29/18 0923  Last data filed at 07/29/18 0500   Gross per 24 hour   Intake             1450 ml   Output                0 ml   Net             1450 ml       Laboratory  Recent Labs      07/26/18   1205  07/27/18   0433   WBC  11.6*  8.2   RBC  5.41*  4.52   HEMOGLOBIN  15.8  13.1   HEMATOCRIT  47.1*  40.1   MCV  87.1  88.7   MCH  29.2  29.0   MCHC  33.5*  32.7*   RDW  43.1  43.8   PLATELETCT  322  280   MPV  9.1  9.3     Recent Labs      07/26/18   1205  07/27/18   0433   SODIUM  139  140   POTASSIUM  3.5*  4.1   CHLORIDE  110  112   CO2  17*  23   GLUCOSE  89  90   BUN  10  8   CREATININE  0.78  0.76   CALCIUM  8.9  8.2*             Recent Labs      07/27/18   0433   TRIGLYCERIDE  185*   HDL  38*   LDL  129*       Imaging  DX-ABDOMEN COMPLETE WITH AP OR PA CXR   Final Result      Normal chest, abdomen and pelvis radiographs           Assessment/Plan  * Abdominal pain- (present on admission)   Assessment & Plan    -Due to colitis, possibly due to chronic pancreatitis, atrophic pancreas  -Was improved since starting clears however now with nausea and  vomiting  -Not eating much, no adjustment in diet  -If persists may need to go back to n.p.o.  -There is some guarding during abdominal exam, she also seems to suck in her stomach making the exam more difficult        Chronic pancreatitis (HCC)- (present on admission)   Assessment & Plan    -Associated with a cyst  -Continue clear liquid diet for now        Metabolic acidosis- (present on admission)   Assessment & Plan    -Resolved with IV fluids        Hypokalemia- (present on admission)   Assessment & Plan    -Resolved with supplementation        Intractable nausea and vomiting- (present on admission)   Assessment & Plan    -Again, if worsens will consider making patient n.p.o.  -Symptomatic care        Tachycardia- (present on admission)   Assessment & Plan    -Resolved        More than 35 minutes was spent in pt exam, interview, and more than 60 % of this in discussion of plan, diagnoses, prognosis and disposition with patient, family, nurse and case management coordinator.

## 2018-07-29 NOTE — PROGRESS NOTES
Resting on and off. Medicated per MAR for 8/10 abdominal pain. IV site re-dressed, infusing without issue. Call light in reach.     Tele Report:   Sinus Rhythm  HR 67-73  Rare PVC  0.16/ 0.08/ 0.48

## 2018-07-29 NOTE — PROGRESS NOTES
Medicated per MAR for 8/10 pain in LUQ/epigastric region. No other c/o. No nausea at this time. Assessment completed. Call light in reach.

## 2018-07-29 NOTE — PROGRESS NOTES
Continues to rest on and off. Medicated per MAR for nausea and dry heaves, no further c/o after medication. IV ABX hung and infusing without issue. Call light in reach.

## 2018-07-29 NOTE — CARE PLAN
Problem: Knowledge Deficit  Goal: Knowledge of disease process/condition, treatment plan, diagnostic tests, and medications will improve  Outcome: PROGRESSING AS EXPECTED  Educated pt on current POC and new medication, allowed time for questions, all questions answered.     Problem: Pain Management  Goal: Pain level will decrease to patient's comfort goal  Outcome: PROGRESSING AS EXPECTED  Active orders in place to control pt's pain level, will continue to monitor for pain.

## 2018-07-30 VITALS
OXYGEN SATURATION: 93 % | SYSTOLIC BLOOD PRESSURE: 119 MMHG | WEIGHT: 88.18 LBS | HEIGHT: 59 IN | BODY MASS INDEX: 17.78 KG/M2 | RESPIRATION RATE: 18 BRPM | TEMPERATURE: 98.2 F | DIASTOLIC BLOOD PRESSURE: 70 MMHG | HEART RATE: 69 BPM

## 2018-07-30 PROBLEM — E87.6 HYPOKALEMIA: Status: RESOLVED | Noted: 2018-07-26 | Resolved: 2018-07-30

## 2018-07-30 PROBLEM — E87.20 METABOLIC ACIDOSIS: Status: RESOLVED | Noted: 2018-07-26 | Resolved: 2018-07-30

## 2018-07-30 LAB — EKG IMPRESSION: NORMAL

## 2018-07-30 PROCEDURE — A9270 NON-COVERED ITEM OR SERVICE: HCPCS | Performed by: INTERNAL MEDICINE

## 2018-07-30 PROCEDURE — 700102 HCHG RX REV CODE 250 W/ 637 OVERRIDE(OP): Performed by: INTERNAL MEDICINE

## 2018-07-30 PROCEDURE — 700111 HCHG RX REV CODE 636 W/ 250 OVERRIDE (IP): Performed by: INTERNAL MEDICINE

## 2018-07-30 PROCEDURE — 700111 HCHG RX REV CODE 636 W/ 250 OVERRIDE (IP): Performed by: HOSPITALIST

## 2018-07-30 PROCEDURE — 99239 HOSP IP/OBS DSCHRG MGMT >30: CPT | Performed by: INTERNAL MEDICINE

## 2018-07-30 PROCEDURE — 700101 HCHG RX REV CODE 250: Performed by: HOSPITALIST

## 2018-07-30 PROCEDURE — 700105 HCHG RX REV CODE 258: Performed by: INTERNAL MEDICINE

## 2018-07-30 PROCEDURE — 700105 HCHG RX REV CODE 258: Performed by: HOSPITALIST

## 2018-07-30 RX ORDER — METRONIDAZOLE 500 MG/1
500 TABLET ORAL EVERY 8 HOURS
Status: DISCONTINUED | OUTPATIENT
Start: 2018-07-30 | End: 2018-07-30 | Stop reason: HOSPADM

## 2018-07-30 RX ADMIN — METRONIDAZOLE 500 MG: 500 INJECTION, SOLUTION INTRAVENOUS at 04:49

## 2018-07-30 RX ADMIN — METRONIDAZOLE 500 MG: 500 INJECTION, SOLUTION INTRAVENOUS at 14:38

## 2018-07-30 RX ADMIN — OXYCODONE HYDROCHLORIDE 20 MG: 10 TABLET ORAL at 15:01

## 2018-07-30 RX ADMIN — OXYCODONE HYDROCHLORIDE 20 MG: 10 TABLET ORAL at 08:20

## 2018-07-30 RX ADMIN — ONDANSETRON 4 MG: 2 INJECTION, SOLUTION INTRAMUSCULAR; INTRAVENOUS at 08:21

## 2018-07-30 RX ADMIN — LORAZEPAM 0.5 MG: 2 INJECTION INTRAMUSCULAR; INTRAVENOUS at 15:02

## 2018-07-30 RX ADMIN — SODIUM CHLORIDE 1 G: 900 INJECTION INTRAVENOUS at 05:45

## 2018-07-30 RX ADMIN — SODIUM CHLORIDE: 900 INJECTION, SOLUTION INTRAVENOUS at 10:15

## 2018-07-30 RX ADMIN — HYDROMORPHONE HYDROCHLORIDE 2 MG: 2 INJECTION, SOLUTION INTRAMUSCULAR; INTRAVENOUS; SUBCUTANEOUS at 04:45

## 2018-07-30 RX ADMIN — PROCHLORPERAZINE MALEATE 10 MG: 10 TABLET, FILM COATED ORAL at 03:25

## 2018-07-30 RX ADMIN — HYDROMORPHONE HYDROCHLORIDE 2 MG: 2 INJECTION, SOLUTION INTRAMUSCULAR; INTRAVENOUS; SUBCUTANEOUS at 10:15

## 2018-07-30 RX ADMIN — FAMOTIDINE 20 MG: 10 INJECTION, SOLUTION INTRAVENOUS at 04:45

## 2018-07-30 ASSESSMENT — PAIN SCALES - GENERAL
PAINLEVEL_OUTOF10: 8
PAINLEVEL_OUTOF10: 7
PAINLEVEL_OUTOF10: 8
PAINLEVEL_OUTOF10: 6

## 2018-07-30 NOTE — PROGRESS NOTES
C/o pain and nausea, medicated per MAR. Thin yellow emesis noted in bag on bedside table. Iv infusing without issue. Up as needed to bathroom to void. Call light in reach.

## 2018-07-30 NOTE — PROGRESS NOTES
Report received. Care assumed. Resting in bed. Alert and oriented. C/o nausea and pain, medicated per MAR. Ambulate to bathroom, gait steady. Call light in reach.

## 2018-07-30 NOTE — RESPIRATORY CARE
"COPD Education by COPD Clinical Educator  7/30/2018 at 1:45 PM by Candida Britt    Patient interviewed by COPD education team.  Patient refused full COPD program at this time, but agreed to short intervention.  A comprehensive packet including information about COPD, treatments, and smoking cessation given.Short intervention was completed with this patient covering: What is COPD (how the lungs work), daily medications rescue and maintenance, breathing techniques, infection prevention were covered in detail.   Smoking Cessation Intervention 3 -10 minutes completed.  Provided smoking cessation packet with \"Tips to Quit\" and flyer for \"Free Smoking Cessation Classes\".   "

## 2018-07-30 NOTE — PROGRESS NOTES
Resting with eyes closed. Respirations even and unlabored. IV infusing without issue. Repositions self. Call light in reach.

## 2018-07-30 NOTE — PROGRESS NOTES
Tele strip printed at 0801 shows SR.    Measurements from this printed strip:  NV.14  QRS.06  QT.42    Tele Summary:  Rhythm throughout showed SR  Rate was 70-90.  Ectopy through out the shift showed O PVCs.    Tele strip placed in the patient's chart.

## 2018-07-30 NOTE — DIETARY
"Nutrition services-Brief Note: Day 4 of admit.  Antonieta Addison is a 63 y.o. female with admitting DX of Chronic Pancreatitis, Colitis, and Tachycardia.     Pt remains on clear liquids. Pt on day #4 of NPO/Clear Liquid diet and nutrition not sufficient. Per MD progress note, on 7/29, pt states pain has improved though persists. Pt now noted with N/V and MD adding pharmalogical intervention. Per assessment and plan, pt was improved though started clears and noted N/V and if persists states may need to return to NPO. Noted metabolic acidosis resolved with IVF, Hypokalemia resolved with supplementation, and Tachycardia resolved. RD to continue to follow clinical course for diet advancement or nutritional support consult.     Assessment:  Height: 149.9 cm (4' 11.02\")  Weight: 40 kg (88 lb 2.9 oz)  Body mass index is 17.8 kg/m².  Diet/Intake: NPO/Clear Liquid day# 4 (PO on clears of < 25% x 3)    Labs, MAR (pepcid 20mg BID, Zofran 4mg IV q4 hours last given 7/30 0820 and Compazine 10mg oral q6 hours last given 7/30 0325)  and chart reviewed (last BM 7/29 and no emesis noted).     Malnutrition Risk: Sub-optimal PO in acute, Poor PO PTA, and chronic low BMI    Recommendations/Plan:  1. Nutrition per MD discretion. Advance diet when medically feasible.   2. Encourage intake of 50% or greater when diet advanced.   3. Document intake of all meals  as % taken in ADL's to provide interdisciplinary communication across all shifts.   4. Monitor weight.  5. Nutrition rep to see pt when diet advanced for meal and snack preferences.             "

## 2018-07-30 NOTE — DOCUMENTATION QUERY
DOCUMENTATION QUERY    PROVIDERS: Please select “Cosign w/ note”to reply to query.    To better represent the severity of illness of your patient, please review the following information and exercise your independent professional judgment in responding to this query.     Malnutrition risk: periods of poor oral nutrition during flare ups and BMI 17.8 is noted in the Registered Dietitians progress note. Based upon the clinical findings, risk factors, and treatment, can a diagnosis be provided to support this finding?    • Mild Protein Calorie Malnutrition  • Moderate Protein Calorie Malnutrition  • Severe Protein Calorie Malnutrition  • Cachexia  • Underweight  • Findings of no clinical significance  • Other explanation of clinical findings  • Unable to determine    The medical record reflects the following:   Clinical Findings  Registered Dieitian 7/30  -day #4 of NPO/Clear Liquid diet and nutrition not sufficient  -Sub-optimal PO in acute, Poor PO PTA, and chronic low BMI  Registered Dieitian 7/27  -poor PO PTA as well as BMI <19 (=17.8)  -Underweight  -severe N/V and has been unable to tolerate PO food or liquids  -Labs: Calcium 8.2 (L), Total Cholesterol 204 (H),  (H), HDL 38 (L),  (H)    Hospitalist Progress Note 7/29/2018  -not eating much  -intractable nausea/vomiting  -NPO    H&P  -patient has not been able to eat with minimal fluid intake, food increases her pain  -acidosis due to dehydration and starvation     Treatment  Diet advancement when medically feasible, monitor weight, nutrition rep to see patient for ongoing meal and snack preferences, document percentage of meals eaten    Risk Factors  Chronic pancreatitis, colitis, n/v, abdominal pain   Location within medical record  History and Physical, Progress Notes and Dietitian notes     Thank you,   Ruba Titus MSN, RN, CNL, CNML  Clinical   [email or web address]  [phone]

## 2018-07-30 NOTE — DISCHARGE SUMMARY
Discharge Summary    CHIEF COMPLAINT ON ADMISSION  Chief Complaint   Patient presents with   • LUQ Pain     Pt states she was in here on sunday for her pancreas and it just hasn't gotten any better. Pt states that it is now raadiating to her RUQ.        Reason for Admission  abdomen pain     Admission Date  7/26/2018    CODE STATUS  Full Code    HPI & HOSPITAL COURSE  This is a 63 y.o. female here with abdominal pain.  Patient does have a history of chronic pancreatitis with pseudocyst, has occasional bouts of pancreatitis.  Patient had a CT scan on 7/22 that showed the cyst as well as possible colitis.  Patient at that point time was sent home from the emergency department.  Patient states the pain became worse so she presented back.  Patient was started on Rocephin and Flagyl, finished a 5 day course of antibiotics.  Patient was struggling to tolerate clears, today just switched it to regular since that which she generally eats and she tolerated it well.  Patient has had significant improvement in her abdominal pain.  Patient also was having nausea vomiting which is currently resolved.  Patient is now deemed back to her baseline.      Therefore, she is discharged in fair and stable condition to home with close outpatient follow-up.    The patient met 2-midnight criteria for an inpatient stay at the time of discharge.    Discharge Date  7/30/18    FOLLOW UP ITEMS POST DISCHARGE  Follow-up with primary care provider within 1 week  Emergency department on 911 in case of emergency    DISCHARGE DIAGNOSES  Principal Problem:    Abdominal pain POA: Yes  Active Problems:    Chronic pancreatitis (HCC) POA: Yes  Resolved Problems:    Tachycardia POA: Yes    Intractable nausea and vomiting POA: Yes    Hypokalemia POA: Yes    Metabolic acidosis POA: Yes      FOLLOW UP  No future appointments.  Dr. Matta    Call today  to schedule a follow-up appoinment.      MEDICATIONS ON DISCHARGE     Medication List      CHANGE how you  take these medications      Instructions   aspirin 325 MG Tabs  What changed:  Another medication with the same name was removed. Continue taking this medication, and follow the directions you see here.  Commonly known as:  ASA   Take 325 mg by mouth every four hours as needed for Mild Pain or Inflammation.  Dose:  325 mg        CONTINUE taking these medications      Instructions   cyclobenzaprine 10 MG Tabs  Commonly known as:  FLEXERIL   Take 10 mg by mouth 3 times a day.  Dose:  10 mg     ondansetron 4 MG Tbdp  Commonly known as:  ZOFRAN ODT   Take 1 Tab by mouth every 8 hours as needed.  Dose:  4 mg     oxycodone 15 MG immediate release tablet  Commonly known as:  OXY-IR   Take 15 mg by mouth every four hours as needed for Severe Pain.  Dose:  15 mg            Allergies  Allergies   Allergen Reactions   • Iodine Hives     IVP dye   • Tape Itching     Peels skin off  Paper tape ok, if it is not on to long.       DIET  Orders Placed This Encounter   Procedures   • Diet Order Regular     Standing Status:   Standing     Number of Occurrences:   1     Order Specific Question:   Diet:     Answer:   Regular [1]       ACTIVITY  As tolerated.  Weight bearing as tolerated    CONSULTATIONS  None    PROCEDURES  None    LABORATORY  Lab Results   Component Value Date    SODIUM 138 07/29/2018    POTASSIUM 3.6 07/29/2018    CHLORIDE 107 07/29/2018    CO2 19 (L) 07/29/2018    GLUCOSE 76 07/29/2018    BUN <5 (L) 07/29/2018    CREATININE 0.74 07/29/2018    CREATININE 0.9 11/23/2008        Lab Results   Component Value Date    WBC 8.2 07/27/2018    HEMOGLOBIN 13.1 07/27/2018    HEMATOCRIT 40.1 07/27/2018    PLATELETCT 280 07/27/2018        Total time of the discharge process exceeds 37 minutes.

## 2018-07-30 NOTE — CARE PLAN
Problem: Nutritional:  Goal: Achieve adequate nutritional intake  Diet advancement beyond clear liquids + PO intake >/= 50% of meals   Outcome: PROGRESSING SLOWER THAN EXPECTED

## 2018-07-30 NOTE — PROGRESS NOTES
Initial assessments done, pt A&O x 4, pt complained of nausea and 7/10 pain to LUQ, PRN meds given (See MARs), VS WDL, will continue to monitor.

## 2018-07-30 NOTE — PROGRESS NOTES
Continues to rest. Respirations even and unlabored. Medicated per MAR for nausea. Aware IV pain medication not due at this time. Call light in reach.     Tele Report:   Sinus Rhythm/ Sinus Tach  HR   No ectopy  0.14/ 0.08/ 0.40

## 2018-07-30 NOTE — CARE PLAN
Problem: Knowledge Deficit  Goal: Knowledge of the prescribed therapeutic regimen will improve  Outcome: PROGRESSING AS EXPECTED  Plan of care discussed with patient. Opportunity given for questions. States understanding.     Problem: Mobility  Goal: Risk for activity intolerance will decrease  Outcome: PROGRESSING AS EXPECTED  Encouraged ambulation in halls with staff as tolerated at least 3 times a day.

## 2018-07-30 NOTE — CARE PLAN
Problem: Infection  Goal: Will remain free from infection  Outcome: PROGRESSING AS EXPECTED  Assessed for signs of infection, none noted, hand hygiene done every before and after pt care.    Problem: Pain Management  Goal: Pain level will decrease to patient's comfort goal  Outcome: PROGRESSING AS EXPECTED  Active orders in place and effective to control pt's pain level, will continue to monitor.

## 2018-07-31 LAB
BACTERIA BLD CULT: NORMAL
SIGNIFICANT IND 70042: NORMAL
SITE SITE: NORMAL
SOURCE SOURCE: NORMAL

## 2018-07-31 NOTE — DISCHARGE INSTRUCTIONS
Discharge Instructions    Discharged to home by car with relative. Discharged via wheelchair, hospital escort: Yes.  Special equipment needed: Not Applicable    Be sure to schedule a follow-up appointment with your primary care doctor or any specialists as instructed.     Discharge Plan:   Diet Plan: Discussed  Activity Level: Discussed  Smoking Cessation Offered: Patient Counseled  Confirmed Follow up Appointment: Patient to Call and Schedule Appointment  Confirmed Symptoms Management: Discussed  Medication Reconciliation Updated: Yes  Influenza Vaccine Indication: Indicated: 65 years and older    I understand that a diet low in cholesterol, fat, and sodium is recommended for good health. Unless I have been given specific instructions below for another diet, I accept this instruction as my diet prescription.   Other diet: Regular    Special Instructions: None    · Is patient discharged on Warfarin / Coumadin?   No     Depression / Suicide Risk    As you are discharged from this [facility] facility, it is important to learn how to keep safe from harming yourself.    Recognize the warning signs:  · Abrupt changes in personality, positive or negative- including increase in energy   · Giving away possessions  · Change in eating patterns- significant weight changes-  positive or negative  · Change in sleeping patterns- unable to sleep or sleeping all the time   · Unwillingness or inability to communicate  · Depression  · Unusual sadness, discouragement and loneliness  · Talk of wanting to die  · Neglect of personal appearance   · Rebelliousness- reckless behavior  · Withdrawal from people/activities they love  · Confusion- inability to concentrate     If you or a loved one observes any of these behaviors or has concerns about self-harm, here's what you can do:  · Talk about it- your feelings and reasons for harming yourself  · Remove any means that you might use to hurt yourself (examples: pills, rope, extension  cords, firearm)  · Get professional help from the community (Mental Health, Substance Abuse, psychological counseling)  · Do not be alone:Call your Safe Contact- someone whom you trust who will be there for you.  · Call your local CRISIS HOTLINE 850-7379 or 060-568-3858  · Call your local Children's Mobile Crisis Response Team [place] (984) 321-7956 or www.[employer]  · Call the toll free National Suicide Prevention Hotlines   · National Suicide Prevention Lifeline 722-499-QQYR (4122)  · National Hope Line Network 800-SUICIDE (116-3196)      Nausea and Vomiting, Adult  Introduction  Feeling sick to your stomach (nausea) means that your stomach is upset or you feel like you have to throw up (vomit). Feeling more and more sick to your stomach can lead to throwing up. Throwing up happens when food and liquid from your stomach are thrown up and out the mouth. Throwing up can make you feel weak and cause you to get dehydrated. Dehydration can make you tired and thirsty, make you have a dry mouth, and make it so you pee (urinate) less often. Older adults and people with other diseases or a weak defense system (immune system) are at higher risk for dehydration. If you feel sick to your stomach or if you throw up, it is important to follow instructions from your doctor about how to take care of yourself.  Follow these instructions at home:  Eating and drinking  Follow these instructions as told by your doctor:  · Take an oral rehydration solution (ORS). This is a drink that is sold at pharmacies and stores.  · Drink clear fluids in small amounts as you are able, such as:  ¨ Water.  ¨ Ice chips.  ¨ Diluted fruit juice.  ¨ Low-calorie sports drinks.  · Eat bland, easy-to-digest foods in small amounts as you are able, such as:  ¨ Bananas.  ¨ Applesauce.  ¨ Rice.  ¨ Low-fat (lean) meats.  ¨ Toast.  ¨ Crackers.  · Avoid fluids that have a lot of sugar or caffeine in them.  · Avoid alcohol.  · Avoid spicy or fatty  foods.  General instructions  · Drink enough fluid to keep your pee (urine) clear or pale yellow.  · Wash your hands often. If you cannot use soap and water, use hand .  · Make sure that all people in your home wash their hands well and often.  · Take over-the-counter and prescription medicines only as told by your doctor.  · Rest at home while you get better.  · Watch your condition for any changes.  · Breathe slowly and deeply when you feel sick to your stomach.  · Keep all follow-up visits as told by your doctor. This is important.  Contact a doctor if:  · You have a fever.  · You cannot keep fluids down.  · Your symptoms get worse.  · You have new symptoms.  · You feel sick to your stomach for more than two days.  · You feel light-headed or dizzy.  · You have a headache.  · You have muscle cramps.  Get help right away if:  · You have pain in your chest, neck, arm, or jaw.  · You feel very weak or you pass out (faint).  · You throw up again and again.  · You see blood in your throw-up.  · Your throw-up looks like black coffee grounds.  · You have bloody or black poop (stools) or poop that look like tar.  · You have a very bad headache, a stiff neck, or both.  · You have a rash.  · You have very bad pain, cramping, or bloating in your belly (abdomen).  · You have trouble breathing.  · You are breathing very quickly.  · Your heart is beating very quickly.  · Your skin feels cold and clammy.  · You feel confused.  · You have pain when you pee.  · You have signs of dehydration, such as:  ¨ Dark pee, hardly any pee, or no pee.  ¨ Cracked lips.  ¨ Dry mouth.  ¨ Sunken eyes.  ¨ Sleepiness.  ¨ Weakness.  These symptoms may be an emergency. Do not wait to see if the symptoms will go away. Get medical help right away. Call your local emergency services (911 in the [place] Do not drive yourself to the hospital.   This information is not intended to replace advice given to you by your health care provider. Make sure  you discuss any questions you have with your health care provider.  Document Released: 06/05/2009 Document Revised: 07/07/2017 Document Reviewed: 08/23/2016  © 2017 Elsevier    Low-Fat Diet for Pancreatitis  A low-fat diet can be helpful if you have pancreatitis or a gallbladder condition. With these conditions, your pancreas and gallbladder have trouble digesting fats. A healthy eating plan with less fat will help rest your pancreas and gallbladder and reduce your symptoms.  What do I need to know about this diet?  Eat a low-fat diet.  Reduce your fat intake to less than 20-30% of your total daily calories. This is less than 50-60 g of fat per day.  Remember that you need some fat in your diet. Ask your dietician what your daily goal should be.  Choose nonfat and low-fat healthy foods. Look for the words “nonfat,” “low fat,” or “fat free.”  As a guide, look on the label and choose foods with less than 3 g of fat per serving. Eat only one serving.  Avoid alcohol.  Do not smoke. If you need help quitting, talk with your health care provider.  Eat small frequent meals instead of three large heavy meals.  What foods can I eat?  Grains   Include healthy grains and starches such as potatoes, wheat bread, fiber-rich cereal, and brown rice. Choose whole grain options whenever possible. In adults, whole grains should account for 45-65% of your daily calories.  Fruits and Vegetables   Eat plenty of fruits and vegetables. Fresh fruits and vegetables add fiber to your diet.  Meats and Other Protein Sources   Eat lean meat such as chicken and pork. Trim any fat off of meat before cooking it. Eggs, fish, and beans are other sources of protein. In adults, these foods should account for 10-35% of your daily calories.  Dairy   Choose low-fat milk and dairy options. Dairy includes fat and protein, as well as calcium.  Fats and Oils   Limit high-fat foods such as fried foods, sweets, baked goods, sugary drinks.  Other   Creamy sauces  and condiments, such as mayonnaise, can add extra fat. Think about whether or not you need to use them, or use smaller amounts or low fat options.  What foods are not recommended?  High fat foods, such as:  Baked goods.  Ice cream.  Sinhala toast.  Sweet rolls.  Pizza.  Cheese bread.  Foods covered with batter, butter, creamy sauces, or cheese.  Fried foods.  Sugary drinks and desserts.  Foods that cause gas or bloating  This information is not intended to replace advice given to you by your health care provider. Make sure you discuss any questions you have with your health care provider.  Document Released: 12/23/2014 Document Revised: 05/25/2017 Document Reviewed: 12/01/2014  Elsevier Interactive Patient Education © 2017 Else[employer] Inc.

## 2018-07-31 NOTE — PROGRESS NOTES
Discharge orders in place, pt A&O x 4, discharge instructions discussed with pt and spouse, pt verbalized understanding, steady on feet, declined wheel chair, able leave on foot with , all belongings accounted for, IV/Tele removed.

## 2018-07-31 NOTE — PROGRESS NOTES
Tele strip printed at 0730 shows SR.    Measurements from this printed strip:  MD.14  QRS.08  QT.46    Tele Summary:  Rhythm throughout showed SR  Rate was 60-80.  Ectopy through out the shift showed none.    Tele strip placed in the patient's chart.

## 2018-08-02 LAB
BACTERIA BLD CULT: ABNORMAL
SIGNIFICANT IND 70042: ABNORMAL
SITE SITE: ABNORMAL
SOURCE SOURCE: ABNORMAL

## 2018-12-24 ENCOUNTER — APPOINTMENT (OUTPATIENT)
Dept: RADIOLOGY | Facility: MEDICAL CENTER | Age: 63
End: 2018-12-24
Attending: EMERGENCY MEDICINE
Payer: MEDICARE

## 2018-12-24 ENCOUNTER — HOSPITAL ENCOUNTER (EMERGENCY)
Facility: MEDICAL CENTER | Age: 63
End: 2018-12-24
Attending: EMERGENCY MEDICINE
Payer: MEDICARE

## 2018-12-24 VITALS
BODY MASS INDEX: 19.56 KG/M2 | HEIGHT: 59 IN | WEIGHT: 97 LBS | HEART RATE: 81 BPM | DIASTOLIC BLOOD PRESSURE: 91 MMHG | TEMPERATURE: 98.9 F | OXYGEN SATURATION: 95 % | RESPIRATION RATE: 18 BRPM | SYSTOLIC BLOOD PRESSURE: 147 MMHG

## 2018-12-24 DIAGNOSIS — R10.84 GENERALIZED ABDOMINAL PAIN: ICD-10-CM

## 2018-12-24 LAB
ALBUMIN SERPL BCP-MCNC: 5.1 G/DL (ref 3.2–4.9)
ALBUMIN/GLOB SERPL: 1.5 G/DL
ALP SERPL-CCNC: 100 U/L (ref 30–99)
ALT SERPL-CCNC: 8 U/L (ref 2–50)
ANION GAP SERPL CALC-SCNC: 12 MMOL/L (ref 0–11.9)
APPEARANCE UR: CLEAR
AST SERPL-CCNC: 13 U/L (ref 12–45)
BACTERIA #/AREA URNS HPF: ABNORMAL /HPF
BASOPHILS # BLD AUTO: 1.1 % (ref 0–1.8)
BASOPHILS # BLD: 0.1 K/UL (ref 0–0.12)
BILIRUB SERPL-MCNC: 0.8 MG/DL (ref 0.1–1.5)
BILIRUB UR QL STRIP.AUTO: NEGATIVE
BUN SERPL-MCNC: 17 MG/DL (ref 8–22)
CALCIUM SERPL-MCNC: 9.5 MG/DL (ref 8.4–10.2)
CHLORIDE SERPL-SCNC: 103 MMOL/L (ref 96–112)
CO2 SERPL-SCNC: 20 MMOL/L (ref 20–33)
COLOR UR: YELLOW
CREAT SERPL-MCNC: 0.69 MG/DL (ref 0.5–1.4)
EOSINOPHIL # BLD AUTO: 0.02 K/UL (ref 0–0.51)
EOSINOPHIL NFR BLD: 0.2 % (ref 0–6.9)
EPI CELLS #/AREA URNS HPF: ABNORMAL /HPF
ERYTHROCYTE [DISTWIDTH] IN BLOOD BY AUTOMATED COUNT: 41 FL (ref 35.9–50)
GLOBULIN SER CALC-MCNC: 3.5 G/DL (ref 1.9–3.5)
GLUCOSE SERPL-MCNC: 91 MG/DL (ref 65–99)
GLUCOSE UR STRIP.AUTO-MCNC: NEGATIVE MG/DL
HCT VFR BLD AUTO: 49.9 % (ref 37–47)
HGB BLD-MCNC: 16.7 G/DL (ref 12–16)
IMM GRANULOCYTES # BLD AUTO: 0.04 K/UL (ref 0–0.11)
IMM GRANULOCYTES NFR BLD AUTO: 0.5 % (ref 0–0.9)
KETONES UR STRIP.AUTO-MCNC: ABNORMAL MG/DL
LEUKOCYTE ESTERASE UR QL STRIP.AUTO: ABNORMAL
LIPASE SERPL-CCNC: 19 U/L (ref 7–58)
LYMPHOCYTES # BLD AUTO: 1.52 K/UL (ref 1–4.8)
LYMPHOCYTES NFR BLD: 17.2 % (ref 22–41)
MCH RBC QN AUTO: 28.8 PG (ref 27–33)
MCHC RBC AUTO-ENTMCNC: 33.5 G/DL (ref 33.6–35)
MCV RBC AUTO: 86.2 FL (ref 81.4–97.8)
MICRO URNS: ABNORMAL
MONOCYTES # BLD AUTO: 0.41 K/UL (ref 0–0.85)
MONOCYTES NFR BLD AUTO: 4.6 % (ref 0–13.4)
MUCOUS THREADS #/AREA URNS HPF: ABNORMAL /HPF
NEUTROPHILS # BLD AUTO: 6.76 K/UL (ref 2–7.15)
NEUTROPHILS NFR BLD: 76.4 % (ref 44–72)
NITRITE UR QL STRIP.AUTO: NEGATIVE
NRBC # BLD AUTO: 0 K/UL
NRBC BLD-RTO: 0 /100 WBC
PH UR STRIP.AUTO: 6.5 [PH]
PLATELET # BLD AUTO: 296 K/UL (ref 164–446)
PMV BLD AUTO: 8.8 FL (ref 9–12.9)
POTASSIUM SERPL-SCNC: 4.8 MMOL/L (ref 3.6–5.5)
PROT SERPL-MCNC: 8.6 G/DL (ref 6–8.2)
PROT UR QL STRIP: NEGATIVE MG/DL
RBC # BLD AUTO: 5.79 M/UL (ref 4.2–5.4)
RBC # URNS HPF: ABNORMAL /HPF
RBC UR QL AUTO: NEGATIVE
SODIUM SERPL-SCNC: 135 MMOL/L (ref 135–145)
SP GR UR STRIP.AUTO: 1.01
WBC # BLD AUTO: 8.9 K/UL (ref 4.8–10.8)
WBC #/AREA URNS HPF: ABNORMAL /HPF

## 2018-12-24 PROCEDURE — 74176 CT ABD & PELVIS W/O CONTRAST: CPT

## 2018-12-24 PROCEDURE — 85025 COMPLETE CBC W/AUTO DIFF WBC: CPT

## 2018-12-24 PROCEDURE — 81001 URINALYSIS AUTO W/SCOPE: CPT

## 2018-12-24 PROCEDURE — 96374 THER/PROPH/DIAG INJ IV PUSH: CPT

## 2018-12-24 PROCEDURE — 700111 HCHG RX REV CODE 636 W/ 250 OVERRIDE (IP): Performed by: EMERGENCY MEDICINE

## 2018-12-24 PROCEDURE — 83690 ASSAY OF LIPASE: CPT

## 2018-12-24 PROCEDURE — 80053 COMPREHEN METABOLIC PANEL: CPT

## 2018-12-24 PROCEDURE — 99285 EMERGENCY DEPT VISIT HI MDM: CPT

## 2018-12-24 PROCEDURE — 96375 TX/PRO/DX INJ NEW DRUG ADDON: CPT

## 2018-12-24 PROCEDURE — 700105 HCHG RX REV CODE 258: Performed by: EMERGENCY MEDICINE

## 2018-12-24 PROCEDURE — 96376 TX/PRO/DX INJ SAME DRUG ADON: CPT

## 2018-12-24 RX ORDER — SODIUM CHLORIDE 9 MG/ML
1000 INJECTION, SOLUTION INTRAVENOUS ONCE
Status: COMPLETED | OUTPATIENT
Start: 2018-12-24 | End: 2018-12-24

## 2018-12-24 RX ORDER — METOCLOPRAMIDE HYDROCHLORIDE 5 MG/ML
10 INJECTION INTRAMUSCULAR; INTRAVENOUS ONCE
Status: COMPLETED | OUTPATIENT
Start: 2018-12-24 | End: 2018-12-24

## 2018-12-24 RX ORDER — ONDANSETRON 4 MG/1
4 TABLET, FILM COATED ORAL EVERY 8 HOURS PRN
Qty: 10 TAB | Refills: 0 | Status: SHIPPED | OUTPATIENT
Start: 2018-12-24 | End: 2018-12-26

## 2018-12-24 RX ORDER — MORPHINE SULFATE 10 MG/ML
6 INJECTION, SOLUTION INTRAMUSCULAR; INTRAVENOUS
Status: DISCONTINUED | OUTPATIENT
Start: 2018-12-24 | End: 2018-12-24 | Stop reason: HOSPADM

## 2018-12-24 RX ORDER — GABAPENTIN 300 MG/1
300 CAPSULE ORAL 3 TIMES DAILY
Status: SHIPPED | COMMUNITY
End: 2024-02-22

## 2018-12-24 RX ADMIN — MORPHINE SULFATE 6 MG: 10 INJECTION INTRAVENOUS at 12:23

## 2018-12-24 RX ADMIN — METOCLOPRAMIDE 10 MG: 5 INJECTION, SOLUTION INTRAMUSCULAR; INTRAVENOUS at 12:23

## 2018-12-24 RX ADMIN — SODIUM CHLORIDE 1000 ML: 9 INJECTION, SOLUTION INTRAVENOUS at 12:23

## 2018-12-24 RX ADMIN — MORPHINE SULFATE 4 MG: 10 INJECTION INTRAVENOUS at 14:04

## 2018-12-24 ASSESSMENT — PAIN SCALES - GENERAL: PAINLEVEL_OUTOF10: 5

## 2018-12-24 NOTE — ED PROVIDER NOTES
"ED Provider Note    CHIEF COMPLAINT  Chief Complaint   Patient presents with   • Abdominal Pain   • Nausea/Vomiting/Diarrhea       [facility]  Antonieta Mcdonald is a 63 y.o. female who presents complaining of abdominal pain.  The abdominal pain is located in the epigastrium.  Sharp and stabbing.  8/10 in severity.  Associated with vomiting and diarrhea.  Patient states she has a history of chronic pancreatitis and this seems like her previous episodes.  Patient denies risk factors for food poisoning.    REVIEW OF SYSTEMS  See HPI for further details.  No fever and chills.  No cough or cold symptoms.  All other systems are negative.    PAST MEDICAL HISTORY  Past Medical History:   Diagnosis Date   • Angina     occasional, had cardiac workup \"ait was fine\"   • Arthritis     rheumatoid; hands & R shoulder   • ASTHMA     \"not an issue since pneumonia in 1995\"   • Back pain    • Bowel habit changes     constipation   • Bronchitis 1995   • Cancer (HCC)     colon CA 1977   • Cataract     surgical repair bilateral   • COPD     asthma, denies COPD   • Dental disorder     upper partial denture   • Fall     occasionally due to macular degeneration   • Heart burn    • Heart murmur     as child   • Hepatitis A 1980's   • Hiatus hernia syndrome    • Hypertension     weight loss, taken off medication approx 2014   • Macular degeneration    • Other specified symptom associated with female genital organs     hysterectomy   • Pain     mid back, low back   • PAIN DISORDER    • Panic attack    • Personal history of venous thrombosis and embolism     1970   • Pneumonia 1995   • Psychiatric problem     panic attacks   • Snoring     sleep study done approx 1998, no treatment recommended       FAMILY HISTORY  History reviewed. No pertinent family history.    SOCIAL HISTORY  Social History     Social History   • Marital status:      Spouse name: N/A   • Number of children: N/A   • Years of education: N/A     Social History Main " "Topics   • Smoking status: Former Smoker     Packs/day: 0.50     Years: 32.00     Types: Cigarettes     Start date: 1/1/1978     Quit date: 4/1/2017   • Smokeless tobacco: Never Used      Comment: 1 ppd times 30yrs   • Alcohol use No   • Drug use: No   • Sexual activity: Not on file     Other Topics Concern   • Not on file     Social History Narrative   • No narrative on file       SURGICAL HISTORY  Past Surgical History:   Procedure Laterality Date   • GASTROSCOPY-ENDO N/A 4/8/2016    Procedure: GASTROSCOPY-ENDO W/POSS BIOPSY, DILATION, PANCREAS POLYPECTOMY AND CONTROL OF HEMORRHAGE;  Surgeon: Nathan Gloria M.D.;  Location: [facility];  Service:    • EGD W/ENDOSCOPIC ULTRASOUND N/A 4/8/2016    Procedure: EGD W/ENDOSCOPIC ULTRASOUND;  Surgeon: Nathan Gloria M.D.;  Location: [facility];  Service:    • EGD WITH ASP/BX N/A 4/8/2016    Procedure: EGD WITH ASP/BX - FNA;  Surgeon: Nathan Gloria M.D.;  Location: [facility];  Service:    • CATARACT EXTRACTION WITH IOL  2013   • COLON RESECTION  1983   • LAPAROSCOPY  1980's   • LAPAROTOMY  1977    bilateral salpingo-oophorectomy   • HYSTERECTOMY, TOTAL ABDOMINAL  1975   • TONSILLECTOMY  1957   • EYE SURGERY Bilateral 1950's    muscle repair   • PRIMARY C SECTION  1972, 1973       CURRENT MEDICATIONS  @ He is Tonie@    ALLERGIES  Allergies   Allergen Reactions   • Iodine Hives and Shortness of Breath     IVP dye   • Tape Itching     Peels skin off  Paper tape ok, if it is not on to long.       PHYSICAL EXAM  VITAL SIGNS: /91   Pulse 100   Temp 37.2 °C (98.9 °F) (Oral)   Resp 18   Ht 1.499 m (4' 11\")   Wt 44 kg (97 lb)   SpO2 97%   BMI 19.59 kg/m²   Constitutional: Well developed, Well nourished, No acute distress, Non-toxic appearance.   HENT: Normocephalic, Atraumatic, Bilateral external ears normal, Oropharynx dry, No oral exudates, Nose normal.   Eyes: JARRELL, EOMI, Conjunctiva normal, No discharge.   Neck: Normal " range of motion, No tenderness, Supple, No stridor. No nuchal rigidity  Lymphatic: No lymphadenopathy noted.   Cardiovascular: Normal heart rate, Normal rhythm, No murmurs, No rubs, No gallops.   Thorax & Lungs: Normal breath sounds, No respiratory distress, No wheezing, No chest tenderness.  Abdomen: Normal bowel sounds.  Soft.  Tender epigastrium and right upper quadrant.  No percussion tenderness rebound or guarding  Musculoskeletal: No CVA tenderness  Skin: Warm, Dry, No erythema, No rash.   Back: No tenderness, No CVA tenderness.   Extremities: No edema, No tenderness, No cyanosis, No clubbing. Dorsalis pedis pulses 2+ equal bilaterally. Radial pulses 2+ equal bilaterally    RADIOLOGY/PROCEDURES  CT-RENAL COLIC EVALUATION(A/P W/O)   Final Result      1.  No acute abnormality identified within the abdomen or pelvis      2.  Prior hysterectomy and possibly appendectomy      3.  Colonic diverticulosis            Results for orders placed or performed during the hospital encounter of 12/24/18   CBC WITH DIFFERENTIAL   Result Value Ref Range    WBC 8.9 4.8 - 10.8 K/uL    RBC 5.79 (H) 4.20 - 5.40 M/uL    Hemoglobin 16.7 (H) 12.0 - 16.0 g/dL    Hematocrit 49.9 (H) 37.0 - 47.0 %    MCV 86.2 81.4 - 97.8 fL    MCH 28.8 27.0 - 33.0 pg    MCHC 33.5 (L) 33.6 - 35.0 g/dL    RDW 41.0 35.9 - 50.0 fL    Platelet Count 296 164 - 446 K/uL    MPV 8.8 (L) 9.0 - 12.9 fL    Neutrophils-Polys 76.40 (H) 44.00 - 72.00 %    Lymphocytes 17.20 (L) 22.00 - 41.00 %    Monocytes 4.60 0.00 - 13.40 %    Eosinophils 0.20 0.00 - 6.90 %    Basophils 1.10 0.00 - 1.80 %    Immature Granulocytes 0.50 0.00 - 0.90 %    Nucleated RBC 0.00 /100 WBC    Neutrophils (Absolute) 6.76 2.00 - 7.15 K/uL    Lymphs (Absolute) 1.52 1.00 - 4.80 K/uL    Monos (Absolute) 0.41 0.00 - 0.85 K/uL    Eos (Absolute) 0.02 0.00 - 0.51 K/uL    Baso (Absolute) 0.10 0.00 - 0.12 K/uL    Immature Granulocytes (abs) 0.04 0.00 - 0.11 K/uL    NRBC (Absolute) 0.00 K/uL   COMP  METABOLIC PANEL   Result Value Ref Range    Sodium 135 135 - 145 mmol/L    Potassium 4.8 3.6 - 5.5 mmol/L    Chloride 103 96 - 112 mmol/L    Co2 20 20 - 33 mmol/L    Anion Gap 12.0 (H) 0.0 - 11.9    Glucose 91 65 - 99 mg/dL    Bun 17 8 - 22 mg/dL    Creatinine 0.69 0.50 - 1.40 mg/dL    Calcium 9.5 8.4 - 10.2 mg/dL    AST(SGOT) 13 12 - 45 U/L    ALT(SGPT) 8 2 - 50 U/L    Alkaline Phosphatase 100 (H) 30 - 99 U/L    Total Bilirubin 0.8 0.1 - 1.5 mg/dL    Albumin 5.1 (H) 3.2 - 4.9 g/dL    Total Protein 8.6 (H) 6.0 - 8.2 g/dL    Globulin 3.5 1.9 - 3.5 g/dL    A-G Ratio 1.5 g/dL   LIPASE   Result Value Ref Range    Lipase 19 7 - 58 U/L   URINALYSIS,CULTURE IF INDICATED   Result Value Ref Range    Color Yellow     Character Clear     Specific Gravity 1.015 <1.035    Ph 6.5 5.0 - 8.0    Glucose Negative Negative mg/dL    Ketones Trace (A) Negative mg/dL    Protein Negative Negative mg/dL    Bilirubin Negative Negative    Nitrite Negative Negative    Leukocyte Esterase Small (A) Negative    Occult Blood Negative Negative    Micro Urine Req Microscopic    ESTIMATED GFR   Result Value Ref Range    GFR If African American >60 >60 mL/min/1.73 m 2    GFR If Non African American >60 >60 mL/min/1.73 m 2   URINE MICROSCOPIC (W/UA)   Result Value Ref Range    WBC 5-10 (A) /hpf    RBC 0-2 /hpf    Bacteria Rare (A) None /hpf    Epithelial Cells Few Few /hpf    Mucous Threads Rare /hpf         COURSE & MEDICAL DECISION MAKING  Pertinent Labs & Imaging studies reviewed. (See chart for details)  Differential diagnosis includes chronic pancreatitis versus choledocholithiasis versus gastroenteritis.  Her blood work is unremarkable.  Her CT scan is unremarkable.  Patient was feeling much improved after IV fluids    HYDRATION: Based on the patient's presentation of Acute Diarrhea the patient was given IV fluids. IV Hydration was used because oral hydration was not adequate alone. Upon recheck following hydration, the patient was markedly  improved.      Patient's blood work is unremarkable.  She was reassured and will be discharged home in stable condition.      FINAL IMPRESSION  1.  Abdominal pain, unknown etiology  2.   3.    Please note that this dictation was created using voice recognition software. I have worked with consultants from the vendor as well as technical experts from [facility] to optimize the interface. I have made every reasonable attempt to correct obvious errors, but I expect that there are errors of grammar and possibly content that I did not discover before finalizing the note.    Electronically signed by: Bg Elliott, 12/24/2018 3:05 PM

## 2018-12-24 NOTE — ED TRIAGE NOTES
Pt presents with a hx of pancreatitis, with at least 4 exacerbations of LUQ abdominal pain within the past 3 years.  She reports worsening abdominal pain originating on her LUQ, and migrating to her right side, worsening for the past 3 days with recurring N/V/D.

## 2018-12-24 NOTE — ED NOTES
Med rec updated and complete  Allergies reviewed  Interviewed pt with  at bedside with permission from pt.  Pt reports no vitamins.  Pt reports no antibiotics in the last 30 days.

## 2018-12-26 ENCOUNTER — HOSPITAL ENCOUNTER (OUTPATIENT)
Facility: MEDICAL CENTER | Age: 63
End: 2018-12-29
Attending: EMERGENCY MEDICINE | Admitting: INTERNAL MEDICINE
Payer: MEDICARE

## 2018-12-26 DIAGNOSIS — M54.9 CHRONIC BACK PAIN, UNSPECIFIED BACK LOCATION, UNSPECIFIED BACK PAIN LATERALITY: ICD-10-CM

## 2018-12-26 DIAGNOSIS — R11.15 INTRACTABLE CYCLICAL VOMITING WITH NAUSEA: ICD-10-CM

## 2018-12-26 DIAGNOSIS — F11.20 OPIATE DEPENDENCE, CONTINUOUS (HCC): ICD-10-CM

## 2018-12-26 DIAGNOSIS — R10.13 EPIGASTRIC PAIN: ICD-10-CM

## 2018-12-26 DIAGNOSIS — G89.29 CHRONIC BACK PAIN, UNSPECIFIED BACK LOCATION, UNSPECIFIED BACK PAIN LATERALITY: ICD-10-CM

## 2018-12-26 PROBLEM — Z72.0 TOBACCO ABUSE: Status: ACTIVE | Noted: 2018-12-26

## 2018-12-26 LAB
ALBUMIN SERPL BCP-MCNC: 4.4 G/DL (ref 3.2–4.9)
ALBUMIN/GLOB SERPL: 1.8 G/DL
ALP SERPL-CCNC: 84 U/L (ref 30–99)
ALT SERPL-CCNC: 7 U/L (ref 2–50)
ANION GAP SERPL CALC-SCNC: 8 MMOL/L (ref 0–11.9)
AST SERPL-CCNC: 12 U/L (ref 12–45)
BASOPHILS # BLD AUTO: 1.1 % (ref 0–1.8)
BASOPHILS # BLD: 0.08 K/UL (ref 0–0.12)
BILIRUB SERPL-MCNC: 0.5 MG/DL (ref 0.1–1.5)
BUN SERPL-MCNC: 15 MG/DL (ref 8–22)
CALCIUM SERPL-MCNC: 9.2 MG/DL (ref 8.4–10.2)
CHLORIDE SERPL-SCNC: 106 MMOL/L (ref 96–112)
CO2 SERPL-SCNC: 23 MMOL/L (ref 20–33)
CREAT SERPL-MCNC: 0.59 MG/DL (ref 0.5–1.4)
EOSINOPHIL # BLD AUTO: 0.01 K/UL (ref 0–0.51)
EOSINOPHIL NFR BLD: 0.1 % (ref 0–6.9)
ERYTHROCYTE [DISTWIDTH] IN BLOOD BY AUTOMATED COUNT: 42.2 FL (ref 35.9–50)
GLOBULIN SER CALC-MCNC: 2.5 G/DL (ref 1.9–3.5)
GLUCOSE SERPL-MCNC: 93 MG/DL (ref 65–99)
HCT VFR BLD AUTO: 44.9 % (ref 37–47)
HGB BLD-MCNC: 15 G/DL (ref 12–16)
IMM GRANULOCYTES # BLD AUTO: 0.03 K/UL (ref 0–0.11)
IMM GRANULOCYTES NFR BLD AUTO: 0.4 % (ref 0–0.9)
LIPASE SERPL-CCNC: 22 U/L (ref 7–58)
LYMPHOCYTES # BLD AUTO: 1.36 K/UL (ref 1–4.8)
LYMPHOCYTES NFR BLD: 18.5 % (ref 22–41)
MCH RBC QN AUTO: 29 PG (ref 27–33)
MCHC RBC AUTO-ENTMCNC: 33.4 G/DL (ref 33.6–35)
MCV RBC AUTO: 86.7 FL (ref 81.4–97.8)
MONOCYTES # BLD AUTO: 0.3 K/UL (ref 0–0.85)
MONOCYTES NFR BLD AUTO: 4.1 % (ref 0–13.4)
NEUTROPHILS # BLD AUTO: 5.59 K/UL (ref 2–7.15)
NEUTROPHILS NFR BLD: 75.8 % (ref 44–72)
NRBC # BLD AUTO: 0 K/UL
NRBC BLD-RTO: 0 /100 WBC
PLATELET # BLD AUTO: 285 K/UL (ref 164–446)
PMV BLD AUTO: 9.1 FL (ref 9–12.9)
POTASSIUM SERPL-SCNC: 4 MMOL/L (ref 3.6–5.5)
PROT SERPL-MCNC: 6.9 G/DL (ref 6–8.2)
RBC # BLD AUTO: 5.18 M/UL (ref 4.2–5.4)
SODIUM SERPL-SCNC: 137 MMOL/L (ref 135–145)
WBC # BLD AUTO: 7.4 K/UL (ref 4.8–10.8)

## 2018-12-26 PROCEDURE — 99218 PR INITIAL OBSERVATION CARE,LEVL I: CPT | Mod: 25 | Performed by: INTERNAL MEDICINE

## 2018-12-26 PROCEDURE — 99407 BEHAV CHNG SMOKING > 10 MIN: CPT | Performed by: INTERNAL MEDICINE

## 2018-12-26 PROCEDURE — 80053 COMPREHEN METABOLIC PANEL: CPT

## 2018-12-26 PROCEDURE — 36415 COLL VENOUS BLD VENIPUNCTURE: CPT

## 2018-12-26 PROCEDURE — 99285 EMERGENCY DEPT VISIT HI MDM: CPT

## 2018-12-26 PROCEDURE — 96376 TX/PRO/DX INJ SAME DRUG ADON: CPT

## 2018-12-26 PROCEDURE — 700102 HCHG RX REV CODE 250 W/ 637 OVERRIDE(OP): Performed by: INTERNAL MEDICINE

## 2018-12-26 PROCEDURE — 85025 COMPLETE CBC W/AUTO DIFF WBC: CPT

## 2018-12-26 PROCEDURE — 700111 HCHG RX REV CODE 636 W/ 250 OVERRIDE (IP): Performed by: INTERNAL MEDICINE

## 2018-12-26 PROCEDURE — 96375 TX/PRO/DX INJ NEW DRUG ADDON: CPT

## 2018-12-26 PROCEDURE — 700105 HCHG RX REV CODE 258: Performed by: INTERNAL MEDICINE

## 2018-12-26 PROCEDURE — 83690 ASSAY OF LIPASE: CPT

## 2018-12-26 PROCEDURE — 700111 HCHG RX REV CODE 636 W/ 250 OVERRIDE (IP): Performed by: EMERGENCY MEDICINE

## 2018-12-26 PROCEDURE — 700105 HCHG RX REV CODE 258: Performed by: EMERGENCY MEDICINE

## 2018-12-26 PROCEDURE — G0378 HOSPITAL OBSERVATION PER HR: HCPCS

## 2018-12-26 PROCEDURE — 96374 THER/PROPH/DIAG INJ IV PUSH: CPT

## 2018-12-26 PROCEDURE — A9270 NON-COVERED ITEM OR SERVICE: HCPCS | Performed by: INTERNAL MEDICINE

## 2018-12-26 RX ORDER — ENALAPRILAT 1.25 MG/ML
1.25 INJECTION INTRAVENOUS EVERY 6 HOURS PRN
Status: DISCONTINUED | OUTPATIENT
Start: 2018-12-26 | End: 2018-12-29 | Stop reason: HOSPADM

## 2018-12-26 RX ORDER — MORPHINE SULFATE 4 MG/ML
4 INJECTION, SOLUTION INTRAMUSCULAR; INTRAVENOUS ONCE
Status: COMPLETED | OUTPATIENT
Start: 2018-12-26 | End: 2018-12-26

## 2018-12-26 RX ORDER — ACETAMINOPHEN 325 MG/1
650 TABLET ORAL EVERY 6 HOURS PRN
Status: DISCONTINUED | OUTPATIENT
Start: 2018-12-26 | End: 2018-12-29 | Stop reason: HOSPADM

## 2018-12-26 RX ORDER — ONDANSETRON 4 MG/1
4 TABLET, ORALLY DISINTEGRATING ORAL EVERY 4 HOURS PRN
Status: DISCONTINUED | OUTPATIENT
Start: 2018-12-26 | End: 2018-12-29 | Stop reason: HOSPADM

## 2018-12-26 RX ORDER — AMOXICILLIN 250 MG
2 CAPSULE ORAL 2 TIMES DAILY
Status: DISCONTINUED | OUTPATIENT
Start: 2018-12-26 | End: 2018-12-29 | Stop reason: HOSPADM

## 2018-12-26 RX ORDER — CYCLOBENZAPRINE HCL 10 MG
10 TABLET ORAL 3 TIMES DAILY PRN
Status: DISCONTINUED | OUTPATIENT
Start: 2018-12-26 | End: 2018-12-29 | Stop reason: HOSPADM

## 2018-12-26 RX ORDER — GABAPENTIN 300 MG/1
300 CAPSULE ORAL 3 TIMES DAILY
Status: DISCONTINUED | OUTPATIENT
Start: 2018-12-26 | End: 2018-12-29 | Stop reason: HOSPADM

## 2018-12-26 RX ORDER — OXYCODONE HYDROCHLORIDE 5 MG/1
15 TABLET ORAL EVERY 4 HOURS PRN
Status: DISCONTINUED | OUTPATIENT
Start: 2018-12-26 | End: 2018-12-27

## 2018-12-26 RX ORDER — ONDANSETRON 2 MG/ML
4 INJECTION INTRAMUSCULAR; INTRAVENOUS ONCE
Status: COMPLETED | OUTPATIENT
Start: 2018-12-26 | End: 2018-12-26

## 2018-12-26 RX ORDER — SODIUM CHLORIDE 9 MG/ML
INJECTION, SOLUTION INTRAVENOUS CONTINUOUS
Status: DISCONTINUED | OUTPATIENT
Start: 2018-12-26 | End: 2018-12-29 | Stop reason: HOSPADM

## 2018-12-26 RX ORDER — SODIUM CHLORIDE 9 MG/ML
1000 INJECTION, SOLUTION INTRAVENOUS ONCE
Status: COMPLETED | OUTPATIENT
Start: 2018-12-26 | End: 2018-12-26

## 2018-12-26 RX ORDER — ONDANSETRON 2 MG/ML
4 INJECTION INTRAMUSCULAR; INTRAVENOUS EVERY 4 HOURS PRN
Status: DISCONTINUED | OUTPATIENT
Start: 2018-12-26 | End: 2018-12-29 | Stop reason: HOSPADM

## 2018-12-26 RX ORDER — LORAZEPAM 2 MG/ML
0.5 INJECTION INTRAMUSCULAR EVERY 4 HOURS PRN
Status: DISCONTINUED | OUTPATIENT
Start: 2018-12-26 | End: 2018-12-28

## 2018-12-26 RX ORDER — ASPIRIN 81 MG/1
81 TABLET, CHEWABLE ORAL DAILY
Status: DISCONTINUED | OUTPATIENT
Start: 2018-12-26 | End: 2018-12-29 | Stop reason: HOSPADM

## 2018-12-26 RX ORDER — POLYETHYLENE GLYCOL 3350 17 G/17G
1 POWDER, FOR SOLUTION ORAL
Status: DISCONTINUED | OUTPATIENT
Start: 2018-12-26 | End: 2018-12-29 | Stop reason: HOSPADM

## 2018-12-26 RX ORDER — BISACODYL 10 MG
10 SUPPOSITORY, RECTAL RECTAL
Status: DISCONTINUED | OUTPATIENT
Start: 2018-12-26 | End: 2018-12-29 | Stop reason: HOSPADM

## 2018-12-26 RX ADMIN — ONDANSETRON 4 MG: 2 INJECTION INTRAMUSCULAR; INTRAVENOUS at 09:28

## 2018-12-26 RX ADMIN — GABAPENTIN 300 MG: 300 CAPSULE ORAL at 23:53

## 2018-12-26 RX ADMIN — ONDANSETRON 4 MG: 2 INJECTION INTRAMUSCULAR; INTRAVENOUS at 12:00

## 2018-12-26 RX ADMIN — LORAZEPAM 0.5 MG: 2 INJECTION INTRAMUSCULAR; INTRAVENOUS at 20:18

## 2018-12-26 RX ADMIN — ONDANSETRON 4 MG: 2 INJECTION INTRAMUSCULAR; INTRAVENOUS at 15:25

## 2018-12-26 RX ADMIN — SODIUM CHLORIDE 1000 ML: 9 INJECTION, SOLUTION INTRAVENOUS at 09:28

## 2018-12-26 RX ADMIN — MORPHINE SULFATE 4 MG: 4 INJECTION INTRAVENOUS at 09:28

## 2018-12-26 RX ADMIN — MORPHINE SULFATE 4 MG: 4 INJECTION INTRAVENOUS at 11:39

## 2018-12-26 RX ADMIN — OXYCODONE HYDROCHLORIDE 15 MG: 5 TABLET ORAL at 15:21

## 2018-12-26 RX ADMIN — ONDANSETRON 4 MG: 2 INJECTION INTRAMUSCULAR; INTRAVENOUS at 23:54

## 2018-12-26 RX ADMIN — OXYCODONE HYDROCHLORIDE 15 MG: 5 TABLET ORAL at 23:54

## 2018-12-26 RX ADMIN — SODIUM CHLORIDE: 9 INJECTION, SOLUTION INTRAVENOUS at 13:52

## 2018-12-26 ASSESSMENT — PAIN SCALES - GENERAL
PAINLEVEL_OUTOF10: 5
PAINLEVEL_OUTOF10: 7

## 2018-12-26 ASSESSMENT — ENCOUNTER SYMPTOMS
DEPRESSION: 0
NAUSEA: 1
MYALGIAS: 0
STRIDOR: 0
DIZZINESS: 0
CHILLS: 1
FALLS: 0
ABDOMINAL PAIN: 1
COUGH: 0
WEAKNESS: 0
SHORTNESS OF BREATH: 0
VOMITING: 1
CONSTIPATION: 0
LOSS OF CONSCIOUSNESS: 0
SPUTUM PRODUCTION: 0
TINGLING: 0
DIARRHEA: 1
PALPITATIONS: 0
FEVER: 1
HEADACHES: 0

## 2018-12-26 ASSESSMENT — PAIN DESCRIPTION - DESCRIPTORS: DESCRIPTORS: TENDER

## 2018-12-26 ASSESSMENT — PATIENT HEALTH QUESTIONNAIRE - PHQ9
2. FEELING DOWN, DEPRESSED, IRRITABLE, OR HOPELESS: NOT AT ALL
SUM OF ALL RESPONSES TO PHQ9 QUESTIONS 1 AND 2: 0
1. LITTLE INTEREST OR PLEASURE IN DOING THINGS: NOT AT ALL

## 2018-12-26 ASSESSMENT — LIFESTYLE VARIABLES: EVER_SMOKED: NEVER

## 2018-12-26 NOTE — ED NOTES
Med rec completed per pt.   Antibiotics within last 30 days: No  Patient allergies have been reviewed: Yes  Comments: Pt has not been able to keep medications down for 5 days.

## 2018-12-26 NOTE — PROGRESS NOTES
Pt arrived on unit. POC and medications reviewed with pt. Pt verbalized understanding. Pt refusing all medication at this time. Safety measures in place. Will continue to monitor.

## 2018-12-26 NOTE — ED PROVIDER NOTES
"ED Provider Note    CHIEF COMPLAINT  Chief Complaint   Patient presents with   • Abdominal Pain     X5 days   • N/V     X5 days       HPI  Antonieta Mcdonald is a 63 y.o. female who presents complaining of 5 days of abdominal pain nausea and vomiting.  Patient states that she has a history of chronic pancreatitis and that this feels the same.  She was seen here on the 24th and given IV fluids and had a CT done which was normal.  They sent her home at that time and she tried to keep food and fluids down at home but has been unsuccessful.  She feels dehydrated.  She describes the pain as sharp stabbing in her epigastric area 8/10.  She denies any chest pains or shortness of breath productive cough or sore throat.  She has no PND orthopnea or leg edema.  She has not had any fevers or chills.  She denies any drinking.  She does smoke.  Her home medications are not helping her symptoms.      REVIEW OF SYSTEMS  HEENT:  No ear pain, congestion or sore throat   EYES: no discharge redness or vision changes  CARDIAC: no chest pain, palpitations    PULMONARY: no dyspnea, cough or congestion   GI: See history of present illness  : no dysuria, back pain or hematuria   Neuro: no weakness, numbness aphasia or headache  Musculoskeletal: no swelling deformity or pain no joint swelling  Endocrine: no fevers, sweating, weight loss   SKIN: no rash, erythema or contusions     See history of present illness all other systems are negative      PAST MEDICAL HISTORY  Past Medical History:   Diagnosis Date   • Pneumonia 1995   • Bronchitis 1995   • Hepatitis A 1980's   • Angina     occasional, had cardiac workup \"ait was fine\"   • Arthritis     rheumatoid; hands & R shoulder   • ASTHMA     \"not an issue since pneumonia in 1995\"   • Back pain    • Bowel habit changes     constipation   • Cancer (HCC)     colon CA 1977   • Cataract     surgical repair bilateral   • COPD     asthma, denies COPD   • Dental disorder     upper partial " denture   • Fall     occasionally due to macular degeneration   • Heart burn    • Heart murmur     as child   • Hiatus hernia syndrome    • Hypertension     weight loss, taken off medication approx 2014   • Macular degeneration    • Other specified symptom associated with female genital organs     hysterectomy   • Pain     mid back, low back   • PAIN DISORDER    • Panic attack    • Personal history of venous thrombosis and embolism     1970   • Psychiatric problem     panic attacks   • Snoring     sleep study done approx 1998, no treatment recommended       FAMILY HISTORY  History reviewed. No pertinent family history.    SOCIAL HISTORY  Social History     Social History   • Marital status:      Spouse name: N/A   • Number of children: N/A   • Years of education: N/A     Social History Main Topics   • Smoking status: Former Smoker     Packs/day: 0.50     Years: 32.00     Types: Cigarettes     Start date: 1/1/1978     Quit date: 4/1/2017   • Smokeless tobacco: Never Used      Comment: 1 ppd times 30yrs   • Alcohol use No   • Drug use: No   • Sexual activity: Not on file     Other Topics Concern   • Not on file     Social History Narrative   • No narrative on file       SURGICAL HISTORY  Past Surgical History:   Procedure Laterality Date   • GASTROSCOPY-ENDO N/A 4/8/2016    Procedure: GASTROSCOPY-ENDO W/POSS BIOPSY, DILATION, PANCREAS POLYPECTOMY AND CONTROL OF HEMORRHAGE;  Surgeon: Nathan Gloria M.D.;  Location: [facility];  Service:    • EGD W/ENDOSCOPIC ULTRASOUND N/A 4/8/2016    Procedure: EGD W/ENDOSCOPIC ULTRASOUND;  Surgeon: Nathan Gloria M.D.;  Location: [facility];  Service:    • EGD WITH ASP/BX N/A 4/8/2016    Procedure: EGD WITH ASP/BX - FNA;  Surgeon: Nathan Gloria M.D.;  Location: [facility];  Service:    • CATARACT EXTRACTION WITH IOL  2013   • COLON RESECTION  1983   • LAPAROSCOPY  1980's   • LAPAROTOMY  1977    bilateral salpingo-oophorectomy   •  "HYSTERECTOMY, TOTAL ABDOMINAL  1975   • TONSILLECTOMY  1957   • EYE SURGERY Bilateral 1950's    muscle repair   • PRIMARY C SECTION  1972, 1973       CURRENT MEDICATIONS  Home Medications     Reviewed by Ella Alexandre (Pharmacy [facility]) on 12/26/18 at 0911  Med List Status: Complete   Medication Last Dose Status   aspirin 81 MG tablet 12/21/2018 Active   cyclobenzaprine (FLEXERIL) 10 MG Tab 12/21/2018 Active   gabapentin (NEURONTIN) 300 MG Cap 12/21/2018 Active   oxycodone (OXY-IR) 15 MG immediate release tablet 12/21/2018 Active                 ALLERGIES  Allergies   Allergen Reactions   • Iodine Hives and Shortness of Breath     IVP dye   • Tape Itching     Peels skin off  Paper tape ok, if it is not on to long.       PHYSICAL EXAM  VITAL SIGNS: BP (!) 169/116   Pulse 89   Temp 36.7 °C (98 °F) (Temporal)   Resp 18   Ht 1.499 m (4' 11\")   Wt 46.2 kg (101 lb 13.6 oz)   SpO2 97%   BMI 20.57 kg/m²  Room air O2: 99    Constitutional: Well developed, Well nourished, No acute distress, Non-toxic appearance.   HENT: Normocephalic, Atraumatic, Bilateral external ears normal, Oropharynx dry, No oral exudates, Nose normal.   Eyes: PERRLA, EOMI, Conjunctiva normal, No discharge.   Neck: Normal range of motion, No tenderness, Supple, No stridor.   Lymphatic: No lymphadenopathy noted.   Cardiovascular: Normal heart rate, Normal rhythm, No murmurs, No rubs, No gallops.   Thorax & Lungs: Normal breath sounds, No respiratory distress, No wheezing, No chest tenderness.   Abdomen: Soft with epigastric tenderness to palpation no rebound masses or peritoneal signs bowel sounds are normal.  Skin: Warm, Dry, No erythema, No rash.   Back: No tenderness, No CVA tenderness.   Extremities: Intact distal pulses, No edema, No tenderness, No cyanosis, No clubbing.   Neurologic: Alert & oriented x 3, Normal motor function, Normal sensory function, No focal deficits noted.       RADIOLOGY/PROCEDURES  No orders to display   I reviewed " the patient's CT from her previous visit on the 24th which is unremarkable for any abnormalities      COURSE & MEDICAL DECISION MAKING  Pertinent Labs & Imaging studies reviewed. (See chart for details)  Differential diagnosis: Chronic pancreatitis, colitis, gastritis, dehydration,     Results for orders placed or performed during the hospital encounter of 12/26/18   COMP METABOLIC PANEL   Result Value Ref Range    Sodium 137 135 - 145 mmol/L    Potassium 4.0 3.6 - 5.5 mmol/L    Chloride 106 96 - 112 mmol/L    Co2 23 20 - 33 mmol/L    Anion Gap 8.0 0.0 - 11.9    Glucose 93 65 - 99 mg/dL    Bun 15 8 - 22 mg/dL    Creatinine 0.59 0.50 - 1.40 mg/dL    Calcium 9.2 8.4 - 10.2 mg/dL    AST(SGOT) 12 12 - 45 U/L    ALT(SGPT) 7 2 - 50 U/L    Alkaline Phosphatase 84 30 - 99 U/L    Total Bilirubin 0.5 0.1 - 1.5 mg/dL    Albumin 4.4 3.2 - 4.9 g/dL    Total Protein 6.9 6.0 - 8.2 g/dL    Globulin 2.5 1.9 - 3.5 g/dL    A-G Ratio 1.8 g/dL   LIPASE   Result Value Ref Range    Lipase 22 7 - 58 U/L   CBC WITH DIFFERENTIAL   Result Value Ref Range    WBC 7.4 4.8 - 10.8 K/uL    RBC 5.18 4.20 - 5.40 M/uL    Hemoglobin 15.0 12.0 - 16.0 g/dL    Hematocrit 44.9 37.0 - 47.0 %    MCV 86.7 81.4 - 97.8 fL    MCH 29.0 27.0 - 33.0 pg    MCHC 33.4 (L) 33.6 - 35.0 g/dL    RDW 42.2 35.9 - 50.0 fL    Platelet Count 285 164 - 446 K/uL    MPV 9.1 9.0 - 12.9 fL    Neutrophils-Polys 75.80 (H) 44.00 - 72.00 %    Lymphocytes 18.50 (L) 22.00 - 41.00 %    Monocytes 4.10 0.00 - 13.40 %    Eosinophils 0.10 0.00 - 6.90 %    Basophils 1.10 0.00 - 1.80 %    Immature Granulocytes 0.40 0.00 - 0.90 %    Nucleated RBC 0.00 /100 WBC    Neutrophils (Absolute) 5.59 2.00 - 7.15 K/uL    Lymphs (Absolute) 1.36 1.00 - 4.80 K/uL    Monos (Absolute) 0.30 0.00 - 0.85 K/uL    Eos (Absolute) 0.01 0.00 - 0.51 K/uL    Baso (Absolute) 0.08 0.00 - 0.12 K/uL    Immature Granulocytes (abs) 0.03 0.00 - 0.11 K/uL    NRBC (Absolute) 0.00 K/uL   ESTIMATED GFR   Result Value Ref Range     GFR If African American >60 >60 mL/min/1.73 m 2    GFR If Non African American >60 >60 mL/min/1.73 m 2        HYDRATION: Based on the patient's presentation of Acute Vomiting the patient was given IV fluids. IV Hydration was used because oral hydration was not adequate alone. Upon recheck following hydration, the patient was improved.    11:52 AM after checking on the patient and giving her IV fluids she still is having pain.  I gave her another dose of pain medication.  Since she is not improving at home I spoke with the hospitalist and she will be admitted for IV hydrations and pain control.  I spoke with Dr. Hugo who accepts her for admission.    FINAL IMPRESSION  1. Intractable cyclical vomiting with nausea    2. Epigastric pain            Electronically signed by: Anitha Azevedo, 12/26/2018 8:36 AM

## 2018-12-26 NOTE — ASSESSMENT & PLAN NOTE
Patient states she is still not keeping anything down  Stop Ativan continue Reglan  There is no evidence of constipation noted on her CT scan (images reviewed)  Reduce IVF, strict NPO except meds

## 2018-12-27 PROBLEM — F11.20 OPIATE DEPENDENCE, CONTINUOUS (HCC): Status: ACTIVE | Noted: 2018-12-27

## 2018-12-27 LAB
ALBUMIN SERPL BCP-MCNC: 3.4 G/DL (ref 3.2–4.9)
ALBUMIN/GLOB SERPL: 1.7 G/DL
ALP SERPL-CCNC: 77 U/L (ref 30–99)
ALT SERPL-CCNC: 8 U/L (ref 2–50)
AMPHET UR QL SCN: NEGATIVE
ANION GAP SERPL CALC-SCNC: 5 MMOL/L (ref 0–11.9)
APPEARANCE UR: CLEAR
AST SERPL-CCNC: 9 U/L (ref 12–45)
BARBITURATES UR QL SCN: NEGATIVE
BENZODIAZ UR QL SCN: NEGATIVE
BILIRUB SERPL-MCNC: 0.4 MG/DL (ref 0.1–1.5)
BILIRUB UR QL STRIP.AUTO: NEGATIVE
BUN SERPL-MCNC: 11 MG/DL (ref 8–22)
BZE UR QL SCN: NEGATIVE
CALCIUM SERPL-MCNC: 8.4 MG/DL (ref 8.4–10.2)
CANNABINOIDS UR QL SCN: NEGATIVE
CHLORIDE SERPL-SCNC: 112 MMOL/L (ref 96–112)
CO2 SERPL-SCNC: 21 MMOL/L (ref 20–33)
COLOR UR: YELLOW
CREAT SERPL-MCNC: 0.57 MG/DL (ref 0.5–1.4)
ERYTHROCYTE [DISTWIDTH] IN BLOOD BY AUTOMATED COUNT: 42.7 FL (ref 35.9–50)
GLOBULIN SER CALC-MCNC: 2 G/DL (ref 1.9–3.5)
GLUCOSE SERPL-MCNC: 93 MG/DL (ref 65–99)
GLUCOSE UR STRIP.AUTO-MCNC: NEGATIVE MG/DL
HCT VFR BLD AUTO: 41.6 % (ref 37–47)
HGB BLD-MCNC: 13.7 G/DL (ref 12–16)
KETONES UR STRIP.AUTO-MCNC: NEGATIVE MG/DL
LEUKOCYTE ESTERASE UR QL STRIP.AUTO: NEGATIVE
MCH RBC QN AUTO: 29.5 PG (ref 27–33)
MCHC RBC AUTO-ENTMCNC: 32.9 G/DL (ref 33.6–35)
MCV RBC AUTO: 89.5 FL (ref 81.4–97.8)
METHADONE UR QL SCN: NEGATIVE
MICRO URNS: NORMAL
NITRITE UR QL STRIP.AUTO: NEGATIVE
OPIATES UR QL SCN: POSITIVE
OXYCODONE UR QL SCN: POSITIVE
PCP UR QL SCN: NEGATIVE
PH UR STRIP.AUTO: 6 [PH]
PLATELET # BLD AUTO: 232 K/UL (ref 164–446)
PMV BLD AUTO: 9 FL (ref 9–12.9)
POTASSIUM SERPL-SCNC: 3.9 MMOL/L (ref 3.6–5.5)
PROPOXYPH UR QL SCN: NEGATIVE
PROT SERPL-MCNC: 5.4 G/DL (ref 6–8.2)
PROT UR QL STRIP: NEGATIVE MG/DL
RBC # BLD AUTO: 4.65 M/UL (ref 4.2–5.4)
RBC UR QL AUTO: NEGATIVE
SODIUM SERPL-SCNC: 138 MMOL/L (ref 135–145)
SP GR UR STRIP.AUTO: 1.01
WBC # BLD AUTO: 5.5 K/UL (ref 4.8–10.8)

## 2018-12-27 PROCEDURE — 96372 THER/PROPH/DIAG INJ SC/IM: CPT

## 2018-12-27 PROCEDURE — 700111 HCHG RX REV CODE 636 W/ 250 OVERRIDE (IP): Performed by: INTERNAL MEDICINE

## 2018-12-27 PROCEDURE — A9270 NON-COVERED ITEM OR SERVICE: HCPCS | Performed by: INTERNAL MEDICINE

## 2018-12-27 PROCEDURE — G0378 HOSPITAL OBSERVATION PER HR: HCPCS

## 2018-12-27 PROCEDURE — 36415 COLL VENOUS BLD VENIPUNCTURE: CPT

## 2018-12-27 PROCEDURE — 700102 HCHG RX REV CODE 250 W/ 637 OVERRIDE(OP): Performed by: INTERNAL MEDICINE

## 2018-12-27 PROCEDURE — 80307 DRUG TEST PRSMV CHEM ANLYZR: CPT

## 2018-12-27 PROCEDURE — 96375 TX/PRO/DX INJ NEW DRUG ADDON: CPT

## 2018-12-27 PROCEDURE — 700105 HCHG RX REV CODE 258: Performed by: INTERNAL MEDICINE

## 2018-12-27 PROCEDURE — 99225 PR SUBSEQUENT OBSERVATION CARE,LEVEL II: CPT | Performed by: INTERNAL MEDICINE

## 2018-12-27 PROCEDURE — 85027 COMPLETE CBC AUTOMATED: CPT

## 2018-12-27 PROCEDURE — 96376 TX/PRO/DX INJ SAME DRUG ADON: CPT

## 2018-12-27 PROCEDURE — 81003 URINALYSIS AUTO W/O SCOPE: CPT | Mod: XU

## 2018-12-27 PROCEDURE — 80053 COMPREHEN METABOLIC PANEL: CPT

## 2018-12-27 RX ORDER — OXYCODONE HCL 5 MG/5 ML
15 SOLUTION, ORAL ORAL EVERY 4 HOURS PRN
Status: DISCONTINUED | OUTPATIENT
Start: 2018-12-27 | End: 2018-12-28

## 2018-12-27 RX ORDER — METOCLOPRAMIDE HYDROCHLORIDE 5 MG/ML
10 INJECTION INTRAMUSCULAR; INTRAVENOUS EVERY 6 HOURS
Status: DISCONTINUED | OUTPATIENT
Start: 2018-12-27 | End: 2018-12-29 | Stop reason: HOSPADM

## 2018-12-27 RX ADMIN — ASPIRIN 81 MG CHEWABLE TABLET 81 MG: 81 TABLET CHEWABLE at 05:49

## 2018-12-27 RX ADMIN — GABAPENTIN 300 MG: 300 CAPSULE ORAL at 20:39

## 2018-12-27 RX ADMIN — LORAZEPAM 0.5 MG: 2 INJECTION INTRAMUSCULAR; INTRAVENOUS at 13:13

## 2018-12-27 RX ADMIN — OXYCODONE HYDROCHLORIDE 15 MG: 5 TABLET ORAL at 10:12

## 2018-12-27 RX ADMIN — LORAZEPAM 0.5 MG: 2 INJECTION INTRAMUSCULAR; INTRAVENOUS at 02:51

## 2018-12-27 RX ADMIN — ONDANSETRON 4 MG: 2 INJECTION INTRAMUSCULAR; INTRAVENOUS at 04:35

## 2018-12-27 RX ADMIN — OXYCODONE HYDROCHLORIDE 15 MG: 5 SOLUTION ORAL at 19:23

## 2018-12-27 RX ADMIN — ONDANSETRON 4 MG: 2 INJECTION INTRAMUSCULAR; INTRAVENOUS at 10:12

## 2018-12-27 RX ADMIN — GABAPENTIN 300 MG: 300 CAPSULE ORAL at 04:35

## 2018-12-27 RX ADMIN — LORAZEPAM 0.5 MG: 2 INJECTION INTRAMUSCULAR; INTRAVENOUS at 19:55

## 2018-12-27 RX ADMIN — OXYCODONE HYDROCHLORIDE 15 MG: 5 SOLUTION ORAL at 14:31

## 2018-12-27 RX ADMIN — LORAZEPAM 0.5 MG: 2 INJECTION INTRAMUSCULAR; INTRAVENOUS at 08:54

## 2018-12-27 RX ADMIN — SODIUM CHLORIDE: 9 INJECTION, SOLUTION INTRAVENOUS at 00:02

## 2018-12-27 RX ADMIN — METOCLOPRAMIDE 10 MG: 5 INJECTION, SOLUTION INTRAMUSCULAR; INTRAVENOUS at 20:39

## 2018-12-27 RX ADMIN — ONDANSETRON 4 MG: 2 INJECTION INTRAMUSCULAR; INTRAVENOUS at 19:23

## 2018-12-27 RX ADMIN — METOCLOPRAMIDE 10 MG: 5 INJECTION, SOLUTION INTRAMUSCULAR; INTRAVENOUS at 14:31

## 2018-12-27 RX ADMIN — ENOXAPARIN SODIUM 40 MG: 100 INJECTION SUBCUTANEOUS at 05:49

## 2018-12-27 RX ADMIN — OXYCODONE HYDROCHLORIDE 15 MG: 5 TABLET ORAL at 05:52

## 2018-12-27 ASSESSMENT — PAIN SCALES - GENERAL
PAINLEVEL_OUTOF10: 7

## 2018-12-27 NOTE — CARE PLAN
Problem: Nutritional:  Goal: Achieve adequate nutritional intake  Advance diet beyond CLD and patient will consume 50% or greater of meals  Outcome: NOT MET

## 2018-12-27 NOTE — DIETARY
"Nutrition services: Day 0 of admit.  Antonieta Mcdonald is a 63 y.o. female with admitting DX of intractable nausea and vomiting.     Consult received for poor PO PTA.     Pt with PMH including Panic Attacks, Pain Disorder, Hysterectomy, HTN, Hiatus Hernia Syndrome, Hep A (80's), COPD, Colon CA (1977), Constipation, Back Pain, Asthma, and Arthritis. Pt underweight for age. Per H&P, Pt presented with N/V/D and abdominal pain and does have a history of similar symptoms. Pt with CT scan in ED and noting acute found on 12/24. Pt was sent home with antiemetics. Pt presented again after not being able to keep anything down at home. Pt noted she does have a pancreatic cyst. Per MD assessment and plan, likely gastroenteritis, given IVF and started on multiple anti-emetics. Pt currently on a clear liquid diet with advancement orders. RD to continue to monitor for diet advancement and adequate PO intake.     Assessment:  Height: 149.9 cm (4' 11\")  Weight: 46.2 kg (101 lb 13.6 oz)  Body mass index is 20.57 kg/m².  Diet/Intake: Currently CLD with Low Fiber GI Soft advancement orders as tolerated.     Evaluation:   1. Meds: ASA, Senna (refused 0600)  2. Fluids: NS 100mL/hr   3. Skin: No decubs noted   4. GI/: NO BM recorded though on admit pt c/o N/V/D and abd pain   5. Labs: AST 9 Total Protein 5.4    Malnutrition Risk: Underweight for age, Poor PO PTA 2/2 N/V/D     Recommendations/Plan:  1. Diet per MD discretion, currently with ADAT orders and on a clear liquid diet  2. RD awaiting diet advancement.   3. RD to continue to monitor.             "

## 2018-12-27 NOTE — RESPIRATORY CARE
COPD EDUCATION by COPD CLINICAL EDUCATOR  12/27/2018 at 11:34 AM by Candida Britt     Patient reviewed by COPD education team. Patient does not qualify for COPD program. Patient has Asthma no issues.

## 2018-12-28 PROCEDURE — 96376 TX/PRO/DX INJ SAME DRUG ADON: CPT

## 2018-12-28 PROCEDURE — A9270 NON-COVERED ITEM OR SERVICE: HCPCS | Performed by: INTERNAL MEDICINE

## 2018-12-28 PROCEDURE — 700105 HCHG RX REV CODE 258: Performed by: INTERNAL MEDICINE

## 2018-12-28 PROCEDURE — G0378 HOSPITAL OBSERVATION PER HR: HCPCS

## 2018-12-28 PROCEDURE — 700111 HCHG RX REV CODE 636 W/ 250 OVERRIDE (IP): Performed by: INTERNAL MEDICINE

## 2018-12-28 PROCEDURE — 99225 PR SUBSEQUENT OBSERVATION CARE,LEVEL II: CPT | Performed by: INTERNAL MEDICINE

## 2018-12-28 PROCEDURE — 700102 HCHG RX REV CODE 250 W/ 637 OVERRIDE(OP): Performed by: INTERNAL MEDICINE

## 2018-12-28 PROCEDURE — 96372 THER/PROPH/DIAG INJ SC/IM: CPT

## 2018-12-28 RX ORDER — OXYCODONE HYDROCHLORIDE 5 MG/1
15 TABLET ORAL EVERY 4 HOURS PRN
Status: DISCONTINUED | OUTPATIENT
Start: 2018-12-28 | End: 2018-12-29 | Stop reason: HOSPADM

## 2018-12-28 RX ADMIN — ONDANSETRON 4 MG: 4 TABLET, ORALLY DISINTEGRATING ORAL at 07:45

## 2018-12-28 RX ADMIN — ENOXAPARIN SODIUM 40 MG: 100 INJECTION SUBCUTANEOUS at 06:06

## 2018-12-28 RX ADMIN — SODIUM CHLORIDE: 9 INJECTION, SOLUTION INTRAVENOUS at 03:42

## 2018-12-28 RX ADMIN — LORAZEPAM 0.5 MG: 2 INJECTION INTRAMUSCULAR; INTRAVENOUS at 00:13

## 2018-12-28 RX ADMIN — OXYCODONE HYDROCHLORIDE 15 MG: 5 SOLUTION ORAL at 07:46

## 2018-12-28 RX ADMIN — METOCLOPRAMIDE 10 MG: 5 INJECTION, SOLUTION INTRAMUSCULAR; INTRAVENOUS at 06:06

## 2018-12-28 RX ADMIN — METOCLOPRAMIDE 10 MG: 5 INJECTION, SOLUTION INTRAMUSCULAR; INTRAVENOUS at 11:43

## 2018-12-28 RX ADMIN — OXYCODONE HYDROCHLORIDE 15 MG: 5 TABLET ORAL at 16:56

## 2018-12-28 RX ADMIN — OXYCODONE HYDROCHLORIDE 15 MG: 5 TABLET ORAL at 21:06

## 2018-12-28 RX ADMIN — METOCLOPRAMIDE 10 MG: 5 INJECTION, SOLUTION INTRAMUSCULAR; INTRAVENOUS at 00:15

## 2018-12-28 RX ADMIN — METOCLOPRAMIDE 10 MG: 5 INJECTION, SOLUTION INTRAMUSCULAR; INTRAVENOUS at 16:57

## 2018-12-28 RX ADMIN — ASPIRIN 81 MG CHEWABLE TABLET 81 MG: 81 TABLET CHEWABLE at 06:06

## 2018-12-28 RX ADMIN — GABAPENTIN 300 MG: 300 CAPSULE ORAL at 21:06

## 2018-12-28 RX ADMIN — OXYCODONE HYDROCHLORIDE 15 MG: 5 SOLUTION ORAL at 12:53

## 2018-12-28 RX ADMIN — GABAPENTIN 300 MG: 300 CAPSULE ORAL at 06:06

## 2018-12-28 RX ADMIN — ONDANSETRON 4 MG: 2 INJECTION INTRAMUSCULAR; INTRAVENOUS at 16:57

## 2018-12-28 RX ADMIN — ONDANSETRON 4 MG: 2 INJECTION INTRAMUSCULAR; INTRAVENOUS at 21:06

## 2018-12-28 RX ADMIN — OXYCODONE HYDROCHLORIDE 15 MG: 5 SOLUTION ORAL at 03:38

## 2018-12-28 RX ADMIN — GABAPENTIN 300 MG: 300 CAPSULE ORAL at 16:56

## 2018-12-28 RX ADMIN — ONDANSETRON 4 MG: 4 TABLET, ORALLY DISINTEGRATING ORAL at 12:53

## 2018-12-28 ASSESSMENT — COGNITIVE AND FUNCTIONAL STATUS - GENERAL
SUGGESTED CMS G CODE MODIFIER MOBILITY: CH
DAILY ACTIVITIY SCORE: 24
SUGGESTED CMS G CODE MODIFIER DAILY ACTIVITY: CH
MOBILITY SCORE: 24

## 2018-12-28 ASSESSMENT — ENCOUNTER SYMPTOMS
WEAKNESS: 1
EYE REDNESS: 0
COUGH: 0
SHORTNESS OF BREATH: 1
SORE THROAT: 0
CHILLS: 1
SINUS PAIN: 0
SHORTNESS OF BREATH: 0
VOMITING: 1
WEIGHT LOSS: 1
ABDOMINAL PAIN: 1
FEVER: 1
NAUSEA: 1
HEADACHES: 0
DIZZINESS: 0
NERVOUS/ANXIOUS: 1
EYE DISCHARGE: 0
DIARRHEA: 1
NERVOUS/ANXIOUS: 0

## 2018-12-28 ASSESSMENT — PAIN SCALES - GENERAL
PAINLEVEL_OUTOF10: 8
PAINLEVEL_OUTOF10: 6
PAINLEVEL_OUTOF10: 6
PAINLEVEL_OUTOF10: 7
PAINLEVEL_OUTOF10: 8

## 2018-12-28 ASSESSMENT — LIFESTYLE VARIABLES: SUBSTANCE_ABUSE: 0

## 2018-12-28 NOTE — PROGRESS NOTES
"Received report from NOC RN; assumed pt care. Pt A&Ox4, sitting up in bed. Pt states she \"feels better.\" Though pt states nausea is better she is requesting Zofran.  States 6/10 abdominal pain. Advanced diet to GI soft, will continue to monitor.    "

## 2018-12-28 NOTE — CARE PLAN
Problem: Bowel/Gastric:  Goal: Normal bowel function is maintained or improved  Outcome: PROGRESSING AS EXPECTED  Advanced to GI soft, will continue to monitor.     Problem: Pain Management  Goal: Pain level will decrease to patient's comfort goal  Outcome: PROGRESSING AS EXPECTED  Pt notified to inform the RN of any pain greater than comfort goal.

## 2018-12-28 NOTE — PROGRESS NOTES
"Hospital Medicine Daily Progress Note    Date of Service  12/27/2018    Chief Complaint  Nausea, vomiting, abdominal pain, diarrhea    Hospital Course    *63-year-old female on chronic opiate therapy of 15 mg oxycodone every 4 hours for chronic back pain presents to the emergency room with nausea vomiting abdominal pain and diarrhea she thought this was pancreatitis which she has had previous and presented to the emergency room on [place] [address] CT scan showed nothing acute and labs were unremarkable she was discharged after receiving IV fluids and given antiemetics she presents again with same she has subjective fever and chills stated that she felt dehydrated-creatinine was at or better than baseline and her BUN was 15 with normal sodium and potassium, lipase was normal CT images from 12/24 were reviewed and were there was no evidence of constipation.*      Interval Problem Update  Patient wants something else for pain that \"she will be able to keep down\"  She has several emesis bags around her but all of them are still in their paper wrapper, she has empty saltines wrappers on her bedside table.  I asked if she kept the saltines down and she claims that she vomited them-this is not witnessed by nursing.  I told her we could change her pain medicine to liquid oxycodone, but that I would not escalate the dose.  Patient was agreeable with this plan.  Discussed with RN  Patient has not had witnessed emesis    Consultants/Specialty  None    Code Status  Full    Disposition  Home    Review of Systems  Review of Systems   Constitutional: Positive for chills, fever, malaise/fatigue and weight loss.   HENT: Negative for sinus pain and sore throat.    Eyes: Negative for discharge and redness.   Respiratory: Positive for shortness of breath.    Cardiovascular: Positive for chest pain.   Gastrointestinal: Positive for abdominal pain, diarrhea, nausea and vomiting.   Genitourinary: Negative for dysuria.   Skin: Negative for " itching and rash.   Neurological: Positive for weakness.   Psychiatric/Behavioral: Negative for substance abuse. The patient is nervous/anxious.         Physical Exam  Temp:  [36.4 °C (97.6 °F)-36.9 °C (98.4 °F)] 36.9 °C (98.4 °F)  Pulse:  [65-94] 83  Resp:  [16-20] 20  BP: (122-152)/(71-82) 143/80    Physical Exam   Constitutional: She is oriented to person, place, and time. She appears well-developed. No distress.   HENT:   Head: Normocephalic and atraumatic.   Right Ear: External ear normal.   Left Ear: External ear normal.   Nose: Nose normal.   Mouth/Throat: Oropharynx is clear and moist.   Not ketotic   Eyes: Pupils are equal, round, and reactive to light. Conjunctivae and EOM are normal. Right eye exhibits no discharge. Left eye exhibits no discharge. No scleral icterus.   Neck: Neck supple.   Cardiovascular: Normal rate and regular rhythm.    Pulmonary/Chest: Effort normal and breath sounds normal.   Abdominal: Soft. Bowel sounds are normal. She exhibits no distension. There is no tenderness.   Musculoskeletal: She exhibits no edema or tenderness.   Neurological: She is alert and oriented to person, place, and time. No cranial nerve deficit.   Skin: Skin is warm and dry. She is not diaphoretic.   Psychiatric: She has a normal mood and affect.   Nursing note and vitals reviewed.      Fluids    Intake/Output Summary (Last 24 hours) at 12/27/18 1625  Last data filed at 12/27/18 0800   Gross per 24 hour   Intake              180 ml   Output                0 ml   Net              180 ml       Laboratory  Recent Labs      12/26/18   1015  12/27/18   0446   WBC  7.4  5.5   RBC  5.18  4.65   HEMOGLOBIN  15.0  13.7   HEMATOCRIT  44.9  41.6   MCV  86.7  89.5   MCH  29.0  29.5   MCHC  33.4*  32.9*   RDW  42.2  42.7   PLATELETCT  285  232   MPV  9.1  9.0     Recent Labs      12/26/18   1015  12/27/18   0446   SODIUM  137  138   POTASSIUM  4.0  3.9   CHLORIDE  106  112   CO2  23  21   GLUCOSE  93  93   BUN  15  11  "  CREATININE  0.59  0.57   CALCIUM  9.2  8.4                   Imaging  No orders to display        Assessment/Plan  Intractable nausea and vomiting- (present on admission)   Assessment & Plan    Patient states she is still not keeping anything down  Continue antiemetics, add Reglan  There is no evidence of constipation noted on her CT scan (images reviewed)  Continue IV fluids     Abdominal pain- (present on admission)   Assessment & Plan    Patient's abdomen is completely soft and nontender  She has decreased bowel sounds  She is not distended     Opiate dependence, continuous (HCC)   Assessment & Plan    Patient is on chronic oxycodone 15 mg every 4 hours  She is asking for something else that \"I can keep down\" I stated we would not escalate her pain medication but we can change to liquid form  She is agreeable with this plan     Tobacco abuse   Assessment & Plan    -Tobacco cessation counseling and education provided for more than 10 minutes. Nicotine replacement options provided including patch, and further medical treatments including Wellbutrin and chantix.      Chronic back pain- (present on admission)   Assessment & Plan    -Continue home meds including gabapentin, Flexeril          VTE prophylaxis: Lovenox      "

## 2018-12-28 NOTE — ASSESSMENT & PLAN NOTE
Patient is on chronic oxycodone 15 mg every 4 hours  Continue  No escalation as all patients complaints are subjective

## 2018-12-29 VITALS
TEMPERATURE: 97.9 F | RESPIRATION RATE: 16 BRPM | DIASTOLIC BLOOD PRESSURE: 76 MMHG | HEART RATE: 83 BPM | OXYGEN SATURATION: 92 % | BODY MASS INDEX: 20.53 KG/M2 | SYSTOLIC BLOOD PRESSURE: 124 MMHG | WEIGHT: 101.85 LBS | HEIGHT: 59 IN

## 2018-12-29 PROCEDURE — G0378 HOSPITAL OBSERVATION PER HR: HCPCS

## 2018-12-29 PROCEDURE — A9270 NON-COVERED ITEM OR SERVICE: HCPCS | Performed by: INTERNAL MEDICINE

## 2018-12-29 PROCEDURE — 700111 HCHG RX REV CODE 636 W/ 250 OVERRIDE (IP): Performed by: INTERNAL MEDICINE

## 2018-12-29 PROCEDURE — 700102 HCHG RX REV CODE 250 W/ 637 OVERRIDE(OP): Performed by: INTERNAL MEDICINE

## 2018-12-29 PROCEDURE — 96376 TX/PRO/DX INJ SAME DRUG ADON: CPT

## 2018-12-29 PROCEDURE — 96372 THER/PROPH/DIAG INJ SC/IM: CPT

## 2018-12-29 PROCEDURE — 99217 PR OBSERVATION CARE DISCHARGE: CPT | Performed by: INTERNAL MEDICINE

## 2018-12-29 PROCEDURE — 700105 HCHG RX REV CODE 258: Performed by: INTERNAL MEDICINE

## 2018-12-29 RX ORDER — OXYCODONE HYDROCHLORIDE 15 MG/1
15 TABLET ORAL
Qty: 20 TAB | Refills: 0 | Status: SHIPPED | OUTPATIENT
Start: 2018-12-29 | End: 2019-01-01

## 2018-12-29 RX ADMIN — METOCLOPRAMIDE 10 MG: 5 INJECTION, SOLUTION INTRAMUSCULAR; INTRAVENOUS at 01:18

## 2018-12-29 RX ADMIN — CYCLOBENZAPRINE HYDROCHLORIDE 10 MG: 10 TABLET, FILM COATED ORAL at 05:44

## 2018-12-29 RX ADMIN — SODIUM CHLORIDE: 9 INJECTION, SOLUTION INTRAVENOUS at 10:07

## 2018-12-29 RX ADMIN — OXYCODONE HYDROCHLORIDE 15 MG: 5 TABLET ORAL at 05:39

## 2018-12-29 RX ADMIN — OXYCODONE HYDROCHLORIDE 15 MG: 5 TABLET ORAL at 14:02

## 2018-12-29 RX ADMIN — OXYCODONE HYDROCHLORIDE 15 MG: 5 TABLET ORAL at 01:18

## 2018-12-29 RX ADMIN — METOCLOPRAMIDE 10 MG: 5 INJECTION, SOLUTION INTRAMUSCULAR; INTRAVENOUS at 05:40

## 2018-12-29 RX ADMIN — METOCLOPRAMIDE 10 MG: 5 INJECTION, SOLUTION INTRAMUSCULAR; INTRAVENOUS at 11:39

## 2018-12-29 RX ADMIN — ONDANSETRON 4 MG: 2 INJECTION INTRAMUSCULAR; INTRAVENOUS at 10:04

## 2018-12-29 RX ADMIN — GABAPENTIN 300 MG: 300 CAPSULE ORAL at 11:39

## 2018-12-29 RX ADMIN — GABAPENTIN 300 MG: 300 CAPSULE ORAL at 05:40

## 2018-12-29 RX ADMIN — OXYCODONE HYDROCHLORIDE 15 MG: 5 TABLET ORAL at 10:04

## 2018-12-29 RX ADMIN — ASPIRIN 81 MG CHEWABLE TABLET 81 MG: 81 TABLET CHEWABLE at 05:39

## 2018-12-29 RX ADMIN — ENOXAPARIN SODIUM 40 MG: 100 INJECTION SUBCUTANEOUS at 05:40

## 2018-12-29 ASSESSMENT — PAIN SCALES - GENERAL
PAINLEVEL_OUTOF10: 4
PAINLEVEL_OUTOF10: 7
PAINLEVEL_OUTOF10: 7
PAINLEVEL_OUTOF10: 6
PAINLEVEL_OUTOF10: 5

## 2018-12-29 NOTE — PROGRESS NOTES
Hospital Medicine Daily Progress Note    Date of Service  12/28/2018    Chief Complaint  Nausea, vomiting, abdominal pain, diarrhea    Hospital Course    *63-year-old female on chronic opiate therapy of 15 mg oxycodone every 4 hours for chronic back pain presents to the emergency room with nausea vomiting abdominal pain and diarrhea she thought this was pancreatitis which she has had previous and presented to the emergency room on [place] [facility] CT scan showed nothing acute and labs were unremarkable she was discharged after receiving IV fluids and given antiemetics she presents again with same she has subjective fever and chills stated that she felt dehydrated-creatinine was at or better than baseline and her BUN was 15 with normal sodium and potassium, lipase was normal CT images from 12/24 were reviewed and were there was no evidence of constipation.*      Interval Problem Update  Still w/o witnessed emesis  Asking for IV ativan for nausea  Asked again for different pain meds  States if not then prefers pill Oxy   reviewed, patient received 150 Oxy 15 11/28 (30d supply)  Toxicology- except Oxy    Consultants/Specialty  None    Code Status  Full    Disposition  Home    Review of Systems  Review of Systems   Constitutional: Positive for chills, fever and malaise/fatigue.   HENT: Negative for sore throat.    Eyes: Negative for discharge and redness.   Respiratory: Negative for cough and shortness of breath.    Cardiovascular: Negative for chest pain.   Gastrointestinal: Positive for abdominal pain, nausea and vomiting.        No BM today   Genitourinary: Negative for dysuria.   Skin: Negative for itching and rash.   Neurological: Positive for weakness. Negative for dizziness and headaches.   Psychiatric/Behavioral: The patient is not nervous/anxious.         Physical Exam  Temp:  [36.5 °C (97.7 °F)-36.9 °C (98.5 °F)] 36.9 °C (98.5 °F)  Pulse:  [77-82] 78  Resp:  [16-18] 16  BP: (130-160)/(78-87) 160/84    Physical  Exam   Constitutional: She is oriented to person, place, and time. She appears well-developed. No distress.   HENT:   Head: Normocephalic and atraumatic.   Right Ear: External ear normal.   Left Ear: External ear normal.   Nose: Nose normal.   Mouth/Throat: Oropharynx is clear and moist. No oropharyngeal exudate.   Eyes: Pupils are equal, round, and reactive to light. Conjunctivae and EOM are normal. Right eye exhibits no discharge. Left eye exhibits no discharge. No scleral icterus.   Neck: Normal range of motion. Neck supple. No tracheal deviation present. No thyromegaly present.   Cardiovascular: Normal rate and regular rhythm.    Pulmonary/Chest: Effort normal and breath sounds normal.   Abdominal: Soft. Bowel sounds are normal. She exhibits no distension. There is tenderness (diffuse). There is no rebound and no guarding.   Musculoskeletal: She exhibits no edema or tenderness.   Neurological: She is alert and oriented to person, place, and time. No cranial nerve deficit.   Skin: Skin is warm and dry. She is not diaphoretic.   Psychiatric:   Affect is off   Nursing note and vitals reviewed.      Fluids    Intake/Output Summary (Last 24 hours) at 12/28/18 1808  Last data filed at 12/28/18 1000   Gross per 24 hour   Intake              600 ml   Output                0 ml   Net              600 ml       Laboratory  Recent Labs      12/26/18   1015  12/27/18   0446   WBC  7.4  5.5   RBC  5.18  4.65   HEMOGLOBIN  15.0  13.7   HEMATOCRIT  44.9  41.6   MCV  86.7  89.5   MCH  29.0  29.5   MCHC  33.4*  32.9*   RDW  42.2  42.7   PLATELETCT  285  232   MPV  9.1  9.0     Recent Labs      12/26/18   1015  12/27/18   0446   SODIUM  137  138   POTASSIUM  4.0  3.9   CHLORIDE  106  112   CO2  23  21   GLUCOSE  93  93   BUN  15  11   CREATININE  0.59  0.57   CALCIUM  9.2  8.4                   Imaging  No orders to display        Assessment/Plan  Intractable nausea and vomiting- (present on admission)   Assessment & Plan     "Patient states she is still not keeping anything down  Continue antiemetics, add Reglan  There is no evidence of constipation noted on her CT scan (images reviewed)  Continue IV fluids     Abdominal pain- (present on admission)   Assessment & Plan    Patient's abdomen is completely soft and nontender  She has decreased bowel sounds  She is not distended     Opiate dependence, continuous (HCC)   Assessment & Plan    Patient is on chronic oxycodone 15 mg every 4 hours  She is asking for something else that \"I can keep down\" I stated we would not escalate her pain medication but we can change to liquid form  She is agreeable with this plan     Tobacco abuse   Assessment & Plan    -Tobacco cessation counseling and education provided for more than 10 minutes. Nicotine replacement options provided including patch, and further medical treatments including Wellbutrin and chantix.      Chronic back pain- (present on admission)   Assessment & Plan    -Continue home meds including gabapentin, Flexeril          VTE prophylaxis: Lovenox      "

## 2018-12-29 NOTE — PROGRESS NOTES
Received report from Triston DARNELL. Assumed care. This pt is AOx4, denies pain, Patient and RN discussed plan of care including monitor BS, IV abx: questions answered. Chart reviewed. Call light in place, fall precautions in place, patient educated on importance of calling for assistance. No additional needs at this time.

## 2018-12-29 NOTE — CARE PLAN
Problem: Discharge Barriers/Planning  Goal: Patient's continuum of care needs will be met    Intervention: Assess potential discharge barriers on admission and throughout hospital stay  All discharge criteria met

## 2018-12-29 NOTE — PROGRESS NOTES
Discharging patient home per MD order.  Pt demonstrated understanding of discharge instructions, follow up appointments, home medications, prescriptions. Ambulating without assistance, voiding without difficulty, pain well controlled, tolerating oral medications, tolerating diet. Pt verbalized understanding of discharge instructions and educational handouts, all questions answered.  Pt discharged off unit with hospital escort at 1415.

## 2018-12-29 NOTE — DISCHARGE INSTRUCTIONS
Discharge Instructions    Discharged to home by car with relative. Discharged via wheelchair, hospital escort: Yes.  Special equipment needed: Not Applicable    Be sure to schedule a follow-up appointment with your primary care doctor or any specialists as instructed.     Discharge Plan:   Diet Plan: Discussed  Activity Level: Discussed  Confirmed Follow up Appointment: Appointment Scheduled  Confirmed Symptoms Management: Discussed  Medication Reconciliation Updated: Yes  Influenza Vaccine Indication: Patient Refuses, Not indicated: Previously immunized this influenza season and > 8 years of age    I understand that a diet low in cholesterol, fat, and sodium is recommended for good health. Unless I have been given specific instructions below for another diet, I accept this instruction as my diet prescription.   Other diet: regular as tolerated    Special Instructions: None    · Is patient discharged on Warfarin / Coumadin?   No     Depression / Suicide Risk    As you are discharged from this [facility] facility, it is important to learn how to keep safe from harming yourself.    Recognize the warning signs:  · Abrupt changes in personality, positive or negative- including increase in energy   · Giving away possessions  · Change in eating patterns- significant weight changes-  positive or negative  · Change in sleeping patterns- unable to sleep or sleeping all the time   · Unwillingness or inability to communicate  · Depression  · Unusual sadness, discouragement and loneliness  · Talk of wanting to die  · Neglect of personal appearance   · Rebelliousness- reckless behavior  · Withdrawal from people/activities they love  · Confusion- inability to concentrate     If you or a loved one observes any of these behaviors or has concerns about self-harm, here's what you can do:  · Talk about it- your feelings and reasons for harming yourself  · Remove any means that you might use to hurt yourself (examples: pills, rope,  extension cords, firearm)  · Get professional help from the community (Mental Health, Substance Abuse, psychological counseling)  · Do not be alone:Call your Safe Contact- someone whom you trust who will be there for you.  · Call your local CRISIS HOTLINE 742-3131 or 580-951-5693  · Call your local Children's Mobile Crisis Response Team [place] (586) 709-8589 or www.[employer]  · Call the toll free National Suicide Prevention Hotlines   · National Suicide Prevention Lifeline 824-561-OUDB (3579)  · National Hope Line Network 800-SUICIDE (314-7283)

## 2018-12-29 NOTE — PROGRESS NOTES
Received report from Triston DARNELL. Assumed care. This pt is AOx4, reports pain, will medicate per MAR. Patient and RN discussed plan of care including pain management, nausea management, IV fluids, NPO: questions answered. Chart reviewed. Call light in place, fall precautions in place, patient educated on importance of calling for assistance. No additional needs at this time.

## 2018-12-30 NOTE — DISCHARGE SUMMARY
Discharge Summary    CHIEF COMPLAINT ON ADMISSION  Chief Complaint   Patient presents with   • Abdominal Pain     X5 days   • N/V     X5 days       Reason for Admission  abdominal pain; N/V/D     Admission Date  12/26/2018    CODE STATUS  Prior    HPI & HOSPITAL COURSE      *63-year-old female on chronic opiate therapy of 15 mg oxycodone every 4 hours for chronic back pain presents to the emergency room with nausea vomiting abdominal pain and diarrhea she thought this was pancreatitis which she has had previous and presented to the emergency room on [place] CT scan showed nothing acute and labs were unremarkable she was discharged after receiving IV fluids and given antiemetics she presents again with same she has subjective fever and chills stated that she felt dehydrated-creatinine was at or better than baseline and her BUN was 15 with normal sodium and potassium, lipase was normal CT images from 12/24 were reviewed and were there was no evidence of constipation.*      Her first day in the hospital she was noted to have empty saltine wrappers on her table.  Emesis bags were still in their paper packaging.  She claims to have vomited and her abdomen was completely benign.  Nursing stated they had not witnessed any emesis nor cleaned up any bags.    The following day patient was still claiming that she was having emesis-however her  had been with her for several hours and stated that there had been no emesis.  She did have some tenderness on exam that day however but her abdomen was completely soft.     Diet was discontinued, pain medications were at the same dose she received at home, however she was allowed 6/day rather than 5/day.    I reviewed her [facility] aware report indicating that she had refilled her medications for 150 tablets on 11/28, this would mean she was out of medication around [place].  It is possible this is the reason she presented to the hospital as she was probably out of her  home pain medicine.    Once I made it clear that we would refill her medication until she follows up with her physician she was able to tolerate a diet and had no emesis.    Patient follows up with her pain management physician on 1/2, we will provide Rx to bridge her to that time.      Therefore, she is discharged in good and stable condition to home with close outpatient follow-up.    Discharge Date  12/29/2018    FOLLOW UP ITEMS POST DISCHARGE  Pain management    DISCHARGE DIAGNOSES  Active Problems:    Intractable nausea and vomiting POA: Yes    Abdominal pain POA: Yes    Chronic back pain POA: Yes    Tobacco abuse POA: Unknown    Opiate dependence, continuous (HCC) POA: Unknown  Resolved Problems:    * No resolved hospital problems. *      FOLLOW UP  Future Appointments  Date Time Provider Department Center   1/10/2019 8:00 AM Germaine Schrader M.D. [facility] None     Pcp Not In Computer            MEDICATIONS ON DISCHARGE     Medication List      CHANGE how you take these medications      Instructions   oxycodone 15 MG immediate release tablet  What changed:  additional instructions  Commonly known as:  OXY-IR   Take 1 Tab by mouth 5 Times a Day for 3 days. Patient sees pain management on 1/2/2018  Dose:  15 mg        CONTINUE taking these medications      Instructions   aspirin 81 MG tablet   Take 81 mg by mouth every day.  Dose:  81 mg     cyclobenzaprine 10 MG Tabs  Commonly known as:  FLEXERIL   Take 10 mg by mouth 3 times a day.  Dose:  10 mg     gabapentin 300 MG Caps  Commonly known as:  NEURONTIN   Take 300 mg by mouth 3 times a day.  Dose:  300 mg            Allergies  Allergies   Allergen Reactions   • Iodine Hives and Shortness of Breath     IVP dye   • Tape Itching     Peels skin off  Paper tape ok, if it is not on to long.       DIET  As tolerated    ACTIVITY  As tolerated    LABORATORY  Lab Results   Component Value Date    SODIUM 138 12/27/2018    POTASSIUM 3.9 12/27/2018    CHLORIDE 112 12/27/2018     CO2 21 12/27/2018    GLUCOSE 93 12/27/2018    BUN 11 12/27/2018    CREATININE 0.57 12/27/2018    CREATININE 0.9 11/23/2008        Lab Results   Component Value Date    WBC 5.5 12/27/2018    HEMOGLOBIN 13.7 12/27/2018    HEMATOCRIT 41.6 12/27/2018    PLATELETCT 232 12/27/2018

## 2019-01-09 ENCOUNTER — TELEPHONE (OUTPATIENT)
Dept: MEDICAL GROUP | Facility: LAB | Age: 64
End: 2019-01-09

## 2019-01-09 NOTE — TELEPHONE ENCOUNTER
NEW PATIENT VISIT PRE-VISIT PLANNING    1.  [facility] Patient is checked in Patient Demographics? YES    2.  Immunizations were updated in Epic using WebIZ?: No WebIZ record       •  Web Iz Recommendations:     3.  Is this appointment scheduled as a Hospital Follow-Up? Yes, visit was at [facility].     4.  Patient is due for the following Health Maintenance Topics:   Health Maintenance Due   Topic Date Due   • Annual Wellness Visit  1955   • IMM HEP B VACCINE (1 of 3 - Risk 3-dose series) 03/27/1972   • IMM DTaP/Tdap/Td Vaccine (1 - Tdap) 03/27/1972   • PAP SMEAR  03/27/1974   • COLONOSCOPY  03/27/2003   • IMM ZOSTER VACCINES (1 of 2) 03/27/2003   • MAMMOGRAM  04/18/2009   • BONE DENSITY  03/27/2018   • IMM PNEUMOCOCCAL 65+ (ADULT) LOW/MEDIUM RISK SERIES (1 of 2 - PCV13) 03/27/2018   • IMM INFLUENZA (1) 09/01/2018           5. Orders for overdue Health Maintenance topics pended in Pre-Charting? N\A/Provider would like to speak with patient first.    6.  Reviewed/Updated the following with patient:   •   Preferred Pharmacy? NO       •   Preferred Lab? NO       •   Preferred Communication? NO       •   Allergies? NO       •   Medications? NO       •   Social History? NO       •   Family History (document living status of immediate family members and if + hx of cancer, diabetes, hypertension, hyperlipidemia, heart attack, stroke) NO    7.  Updated Care Team?       •   DME Company (gait device, O2, CPAP, etc.) NO       •   Other Specialists (eye doctor, derm, GYN, cardiology, endo, etc): NO    8.  Patient was NOT informed to arrive 15 min prior to their   scheduled appointment and bring in their medication bottles.

## 2019-07-20 ENCOUNTER — APPOINTMENT (OUTPATIENT)
Dept: RADIOLOGY | Facility: MEDICAL CENTER | Age: 64
DRG: 190 | End: 2019-07-20
Attending: EMERGENCY MEDICINE
Payer: MEDICARE

## 2019-07-20 ENCOUNTER — HOSPITAL ENCOUNTER (INPATIENT)
Facility: MEDICAL CENTER | Age: 64
LOS: 5 days | DRG: 190 | End: 2019-07-25
Attending: EMERGENCY MEDICINE | Admitting: INTERNAL MEDICINE
Payer: MEDICARE

## 2019-07-20 DIAGNOSIS — G89.29 CHRONIC MIDLINE LOW BACK PAIN WITH SCIATICA, SCIATICA LATERALITY UNSPECIFIED: ICD-10-CM

## 2019-07-20 DIAGNOSIS — J44.1 ACUTE EXACERBATION OF CHRONIC OBSTRUCTIVE PULMONARY DISEASE (COPD) (HCC): ICD-10-CM

## 2019-07-20 DIAGNOSIS — M54.40 CHRONIC MIDLINE LOW BACK PAIN WITH SCIATICA, SCIATICA LATERALITY UNSPECIFIED: ICD-10-CM

## 2019-07-20 DIAGNOSIS — R09.02 HYPOXIA: ICD-10-CM

## 2019-07-20 PROBLEM — F41.9 ANXIETY: Status: ACTIVE | Noted: 2019-07-20

## 2019-07-20 PROBLEM — E87.0 HYPERNATREMIA: Status: ACTIVE | Noted: 2019-07-20

## 2019-07-20 PROBLEM — E87.6 HYPOKALEMIA: Status: ACTIVE | Noted: 2019-07-20

## 2019-07-20 PROBLEM — J96.01 ACUTE RESPIRATORY FAILURE WITH HYPOXEMIA (HCC): Status: ACTIVE | Noted: 2019-07-20

## 2019-07-20 PROBLEM — F10.10 ALCOHOL ABUSE: Status: ACTIVE | Noted: 2019-07-20

## 2019-07-20 LAB
ALBUMIN SERPL BCP-MCNC: 4.2 G/DL (ref 3.2–4.9)
ALBUMIN/GLOB SERPL: 1.3 G/DL
ALP SERPL-CCNC: 101 U/L (ref 30–99)
ALT SERPL-CCNC: 11 U/L (ref 2–50)
ANION GAP SERPL CALC-SCNC: 16 MMOL/L (ref 0–11.9)
AST SERPL-CCNC: 17 U/L (ref 12–45)
BASOPHILS # BLD AUTO: 1.4 % (ref 0–1.8)
BASOPHILS # BLD: 0.08 K/UL (ref 0–0.12)
BILIRUB SERPL-MCNC: 0.4 MG/DL (ref 0.1–1.5)
BUN SERPL-MCNC: 12 MG/DL (ref 8–22)
CALCIUM SERPL-MCNC: 8.7 MG/DL (ref 8.4–10.2)
CHLORIDE SERPL-SCNC: 107 MMOL/L (ref 96–112)
CO2 SERPL-SCNC: 23 MMOL/L (ref 20–33)
CREAT SERPL-MCNC: 0.77 MG/DL (ref 0.5–1.4)
D DIMER PPP IA.FEU-MCNC: 0.73 UG/ML (FEU) (ref 0–0.5)
EOSINOPHIL # BLD AUTO: 0.07 K/UL (ref 0–0.51)
EOSINOPHIL NFR BLD: 1.2 % (ref 0–6.9)
ERYTHROCYTE [DISTWIDTH] IN BLOOD BY AUTOMATED COUNT: 53.5 FL (ref 35.9–50)
GLOBULIN SER CALC-MCNC: 3.2 G/DL (ref 1.9–3.5)
GLUCOSE SERPL-MCNC: 109 MG/DL (ref 65–99)
HCT VFR BLD AUTO: 36.8 % (ref 37–47)
HGB BLD-MCNC: 12 G/DL (ref 12–16)
IMM GRANULOCYTES # BLD AUTO: 0.03 K/UL (ref 0–0.11)
IMM GRANULOCYTES NFR BLD AUTO: 0.5 % (ref 0–0.9)
INR PPP: 0.89 (ref 0.87–1.13)
LACTATE BLD-SCNC: 2.2 MMOL/L (ref 0.5–2)
LYMPHOCYTES # BLD AUTO: 2.47 K/UL (ref 1–4.8)
LYMPHOCYTES NFR BLD: 42.3 % (ref 22–41)
MCH RBC QN AUTO: 30.2 PG (ref 27–33)
MCHC RBC AUTO-ENTMCNC: 32.6 G/DL (ref 33.6–35)
MCV RBC AUTO: 92.7 FL (ref 81.4–97.8)
MONOCYTES # BLD AUTO: 0.29 K/UL (ref 0–0.85)
MONOCYTES NFR BLD AUTO: 5 % (ref 0–13.4)
NEUTROPHILS # BLD AUTO: 2.9 K/UL (ref 2–7.15)
NEUTROPHILS NFR BLD: 49.6 % (ref 44–72)
NRBC # BLD AUTO: 0 K/UL
NRBC BLD-RTO: 0 /100 WBC
NT-PROBNP SERPL IA-MCNC: 115 PG/ML (ref 0–125)
PLATELET # BLD AUTO: 241 K/UL (ref 164–446)
PMV BLD AUTO: 9.1 FL (ref 9–12.9)
POTASSIUM SERPL-SCNC: 3.3 MMOL/L (ref 3.6–5.5)
PROT SERPL-MCNC: 7.4 G/DL (ref 6–8.2)
PROTHROMBIN TIME: 12.1 SEC (ref 12–14.6)
RBC # BLD AUTO: 3.97 M/UL (ref 4.2–5.4)
SODIUM SERPL-SCNC: 146 MMOL/L (ref 135–145)
TROPONIN T SERPL-MCNC: 7 NG/L (ref 6–19)
WBC # BLD AUTO: 5.8 K/UL (ref 4.8–10.8)

## 2019-07-20 PROCEDURE — 84484 ASSAY OF TROPONIN QUANT: CPT

## 2019-07-20 PROCEDURE — 770020 HCHG ROOM/CARE - TELE (206)

## 2019-07-20 PROCEDURE — 83880 ASSAY OF NATRIURETIC PEPTIDE: CPT

## 2019-07-20 PROCEDURE — 93970 EXTREMITY STUDY: CPT

## 2019-07-20 PROCEDURE — 94640 AIRWAY INHALATION TREATMENT: CPT

## 2019-07-20 PROCEDURE — 96375 TX/PRO/DX INJ NEW DRUG ADDON: CPT

## 2019-07-20 PROCEDURE — A9270 NON-COVERED ITEM OR SERVICE: HCPCS | Performed by: INTERNAL MEDICINE

## 2019-07-20 PROCEDURE — 700101 HCHG RX REV CODE 250: Performed by: EMERGENCY MEDICINE

## 2019-07-20 PROCEDURE — 96365 THER/PROPH/DIAG IV INF INIT: CPT

## 2019-07-20 PROCEDURE — 94760 N-INVAS EAR/PLS OXIMETRY 1: CPT

## 2019-07-20 PROCEDURE — 700101 HCHG RX REV CODE 250: Performed by: INTERNAL MEDICINE

## 2019-07-20 PROCEDURE — 87040 BLOOD CULTURE FOR BACTERIA: CPT

## 2019-07-20 PROCEDURE — 85379 FIBRIN DEGRADATION QUANT: CPT

## 2019-07-20 PROCEDURE — 700105 HCHG RX REV CODE 258: Performed by: EMERGENCY MEDICINE

## 2019-07-20 PROCEDURE — 71045 X-RAY EXAM CHEST 1 VIEW: CPT

## 2019-07-20 PROCEDURE — 85610 PROTHROMBIN TIME: CPT

## 2019-07-20 PROCEDURE — 84145 PROCALCITONIN (PCT): CPT

## 2019-07-20 PROCEDURE — 700102 HCHG RX REV CODE 250 W/ 637 OVERRIDE(OP): Performed by: INTERNAL MEDICINE

## 2019-07-20 PROCEDURE — 80053 COMPREHEN METABOLIC PANEL: CPT

## 2019-07-20 PROCEDURE — 700111 HCHG RX REV CODE 636 W/ 250 OVERRIDE (IP): Performed by: INTERNAL MEDICINE

## 2019-07-20 PROCEDURE — 83605 ASSAY OF LACTIC ACID: CPT

## 2019-07-20 PROCEDURE — 99285 EMERGENCY DEPT VISIT HI MDM: CPT

## 2019-07-20 PROCEDURE — 85025 COMPLETE CBC W/AUTO DIFF WBC: CPT

## 2019-07-20 PROCEDURE — 36415 COLL VENOUS BLD VENIPUNCTURE: CPT

## 2019-07-20 PROCEDURE — 700111 HCHG RX REV CODE 636 W/ 250 OVERRIDE (IP): Performed by: EMERGENCY MEDICINE

## 2019-07-20 PROCEDURE — 99222 1ST HOSP IP/OBS MODERATE 55: CPT | Mod: AI | Performed by: INTERNAL MEDICINE

## 2019-07-20 RX ORDER — ONDANSETRON 2 MG/ML
4 INJECTION INTRAMUSCULAR; INTRAVENOUS EVERY 4 HOURS PRN
Status: DISCONTINUED | OUTPATIENT
Start: 2019-07-20 | End: 2019-07-25 | Stop reason: HOSPADM

## 2019-07-20 RX ORDER — BENZONATATE 100 MG/1
100 CAPSULE ORAL 3 TIMES DAILY PRN
Status: DISCONTINUED | OUTPATIENT
Start: 2019-07-20 | End: 2019-07-25 | Stop reason: HOSPADM

## 2019-07-20 RX ORDER — IPRATROPIUM BROMIDE AND ALBUTEROL SULFATE 2.5; .5 MG/3ML; MG/3ML
3 SOLUTION RESPIRATORY (INHALATION)
Status: COMPLETED | OUTPATIENT
Start: 2019-07-20 | End: 2019-07-20

## 2019-07-20 RX ORDER — METHYLPREDNISOLONE SODIUM SUCCINATE 125 MG/2ML
125 INJECTION, POWDER, LYOPHILIZED, FOR SOLUTION INTRAMUSCULAR; INTRAVENOUS EVERY 6 HOURS
Status: DISCONTINUED | OUTPATIENT
Start: 2019-07-21 | End: 2019-07-23

## 2019-07-20 RX ORDER — DOXYCYCLINE 100 MG/1
100 TABLET ORAL EVERY 12 HOURS
Status: DISCONTINUED | OUTPATIENT
Start: 2019-07-20 | End: 2019-07-25 | Stop reason: HOSPADM

## 2019-07-20 RX ORDER — NICOTINE 21 MG/24HR
21 PATCH, TRANSDERMAL 24 HOURS TRANSDERMAL
Status: DISCONTINUED | OUTPATIENT
Start: 2019-07-21 | End: 2019-07-25 | Stop reason: HOSPADM

## 2019-07-20 RX ORDER — SODIUM CHLORIDE AND POTASSIUM CHLORIDE 150; 900 MG/100ML; MG/100ML
INJECTION, SOLUTION INTRAVENOUS CONTINUOUS
Status: DISCONTINUED | OUTPATIENT
Start: 2019-07-20 | End: 2019-07-21

## 2019-07-20 RX ORDER — ALBUTEROL SULFATE 90 UG/1
2 AEROSOL, METERED RESPIRATORY (INHALATION) EVERY 6 HOURS PRN
COMMUNITY
Start: 2019-06-20 | End: 2019-09-10 | Stop reason: SDUPTHER

## 2019-07-20 RX ORDER — GUAIFENESIN 600 MG/1
1200 TABLET, EXTENDED RELEASE ORAL EVERY 12 HOURS
Status: DISCONTINUED | OUTPATIENT
Start: 2019-07-20 | End: 2019-07-25 | Stop reason: HOSPADM

## 2019-07-20 RX ORDER — METHYLPREDNISOLONE SODIUM SUCCINATE 125 MG/2ML
125 INJECTION, POWDER, LYOPHILIZED, FOR SOLUTION INTRAMUSCULAR; INTRAVENOUS ONCE
Status: COMPLETED | OUTPATIENT
Start: 2019-07-20 | End: 2019-07-20

## 2019-07-20 RX ORDER — OXYCODONE HYDROCHLORIDE 5 MG/1
15 TABLET ORAL EVERY 6 HOURS PRN
Status: ON HOLD | COMMUNITY
Start: 2012-07-20 | End: 2019-07-21

## 2019-07-20 RX ORDER — SODIUM CHLORIDE 9 MG/ML
1000 INJECTION, SOLUTION INTRAVENOUS ONCE
Status: COMPLETED | OUTPATIENT
Start: 2019-07-20 | End: 2019-07-20

## 2019-07-20 RX ORDER — POLYETHYLENE GLYCOL 3350 17 G/17G
1 POWDER, FOR SOLUTION ORAL
Status: DISCONTINUED | OUTPATIENT
Start: 2019-07-20 | End: 2019-07-25 | Stop reason: HOSPADM

## 2019-07-20 RX ORDER — LORAZEPAM 2 MG/ML
1 INJECTION INTRAMUSCULAR EVERY 4 HOURS PRN
Status: DISCONTINUED | OUTPATIENT
Start: 2019-07-20 | End: 2019-07-22

## 2019-07-20 RX ORDER — ONDANSETRON 4 MG/1
4 TABLET, ORALLY DISINTEGRATING ORAL EVERY 4 HOURS PRN
Status: DISCONTINUED | OUTPATIENT
Start: 2019-07-20 | End: 2019-07-25 | Stop reason: HOSPADM

## 2019-07-20 RX ORDER — ACETAMINOPHEN 325 MG/1
650 TABLET ORAL EVERY 6 HOURS PRN
Status: DISCONTINUED | OUTPATIENT
Start: 2019-07-20 | End: 2019-07-25 | Stop reason: HOSPADM

## 2019-07-20 RX ORDER — BISACODYL 10 MG
10 SUPPOSITORY, RECTAL RECTAL
Status: DISCONTINUED | OUTPATIENT
Start: 2019-07-20 | End: 2019-07-25 | Stop reason: HOSPADM

## 2019-07-20 RX ORDER — IPRATROPIUM BROMIDE AND ALBUTEROL SULFATE 2.5; .5 MG/3ML; MG/3ML
3 SOLUTION RESPIRATORY (INHALATION)
Status: DISCONTINUED | OUTPATIENT
Start: 2019-07-21 | End: 2019-07-21

## 2019-07-20 RX ADMIN — DOXYCYCLINE 100 MG: 100 TABLET, FILM COATED ORAL at 23:38

## 2019-07-20 RX ADMIN — SODIUM CHLORIDE 1000 ML: 9 INJECTION, SOLUTION INTRAVENOUS at 21:21

## 2019-07-20 RX ADMIN — AMPICILLIN SODIUM AND SULBACTAM SODIUM 3 G: 2; 1 INJECTION, POWDER, FOR SOLUTION INTRAMUSCULAR; INTRAVENOUS at 21:20

## 2019-07-20 RX ADMIN — GUAIFENESIN 1200 MG: 600 TABLET, EXTENDED RELEASE ORAL at 23:38

## 2019-07-20 RX ADMIN — METHYLPREDNISOLONE SODIUM SUCCINATE 125 MG: 125 INJECTION, POWDER, FOR SOLUTION INTRAMUSCULAR; INTRAVENOUS at 21:13

## 2019-07-20 RX ADMIN — POTASSIUM CHLORIDE AND SODIUM CHLORIDE: 900; 150 INJECTION, SOLUTION INTRAVENOUS at 23:39

## 2019-07-20 RX ADMIN — IPRATROPIUM BROMIDE AND ALBUTEROL SULFATE 3 ML: .5; 3 SOLUTION RESPIRATORY (INHALATION) at 21:09

## 2019-07-20 RX ADMIN — LORAZEPAM 1 MG: 2 INJECTION INTRAMUSCULAR; INTRAVENOUS at 23:39

## 2019-07-20 ASSESSMENT — COPD QUESTIONNAIRES
HAVE YOU SMOKED AT LEAST 100 CIGARETTES IN YOUR ENTIRE LIFE: YES
DO YOU EVER COUGH UP ANY MUCUS OR PHLEGM?: NO/ONLY WITH OCCASIONAL COLDS OR INFECTIONS
DURING THE PAST 4 WEEKS HOW MUCH DID YOU FEEL SHORT OF BREATH: MOST  OR ALL OF THE TIME
HAVE YOU SMOKED AT LEAST 100 CIGARETTES IN YOUR ENTIRE LIFE: YES
DURING THE PAST 4 WEEKS HOW MUCH DID YOU FEEL SHORT OF BREATH: SOME OF THE TIME
COPD SCREENING SCORE: 5
DO YOU EVER COUGH UP ANY MUCUS OR PHLEGM?: NO/ONLY WITH OCCASIONAL COLDS OR INFECTIONS
COPD SCREENING SCORE: 6
IN THE PAST 12 MONTHS DO YOU DO LESS THAN YOU USED TO BECAUSE OF YOUR BREATHING PROBLEMS: DISAGREE/UNSURE

## 2019-07-20 ASSESSMENT — ENCOUNTER SYMPTOMS
CHILLS: 0
EYE REDNESS: 0
MYALGIAS: 0
FEVER: 0
WEAKNESS: 0
SORE THROAT: 0
HEADACHES: 1
DIZZINESS: 1
DIAPHORESIS: 0
VOMITING: 0
NAUSEA: 0
WEIGHT LOSS: 1
SPUTUM PRODUCTION: 1
EYE DISCHARGE: 0
COUGH: 1
DEPRESSION: 0
ROS GI COMMENTS: POOR APPETITE
NERVOUS/ANXIOUS: 1

## 2019-07-20 ASSESSMENT — LIFESTYLE VARIABLES
SUBSTANCE_ABUSE: 0
EVER_SMOKED: YES
ALCOHOL_USE: NO
EVER_SMOKED: YES

## 2019-07-20 ASSESSMENT — PAIN DESCRIPTION - DESCRIPTORS: DESCRIPTORS: BURNING

## 2019-07-21 LAB
ALBUMIN SERPL BCP-MCNC: 3.3 G/DL (ref 3.2–4.9)
ALBUMIN/GLOB SERPL: 1.2 G/DL
ALP SERPL-CCNC: 96 U/L (ref 30–99)
ALT SERPL-CCNC: 10 U/L (ref 2–50)
ANION GAP SERPL CALC-SCNC: 8 MMOL/L (ref 0–11.9)
AST SERPL-CCNC: 14 U/L (ref 12–45)
BASOPHILS # BLD AUTO: 0.4 % (ref 0–1.8)
BASOPHILS # BLD: 0.03 K/UL (ref 0–0.12)
BILIRUB SERPL-MCNC: 0.7 MG/DL (ref 0.1–1.5)
BUN SERPL-MCNC: 13 MG/DL (ref 8–22)
CALCIUM SERPL-MCNC: 7.9 MG/DL (ref 8.4–10.2)
CHLORIDE SERPL-SCNC: 113 MMOL/L (ref 96–112)
CO2 SERPL-SCNC: 20 MMOL/L (ref 20–33)
CREAT SERPL-MCNC: 0.79 MG/DL (ref 0.5–1.4)
EOSINOPHIL # BLD AUTO: 0.02 K/UL (ref 0–0.51)
EOSINOPHIL NFR BLD: 0.3 % (ref 0–6.9)
ERYTHROCYTE [DISTWIDTH] IN BLOOD BY AUTOMATED COUNT: 53.4 FL (ref 35.9–50)
GLOBULIN SER CALC-MCNC: 2.7 G/DL (ref 1.9–3.5)
GLUCOSE SERPL-MCNC: 144 MG/DL (ref 65–99)
HCT VFR BLD AUTO: 34.1 % (ref 37–47)
HGB BLD-MCNC: 11 G/DL (ref 12–16)
IMM GRANULOCYTES # BLD AUTO: 0.06 K/UL (ref 0–0.11)
IMM GRANULOCYTES NFR BLD AUTO: 0.8 % (ref 0–0.9)
LACTATE BLD-SCNC: 1 MMOL/L (ref 0.5–2)
LACTATE BLD-SCNC: 2.5 MMOL/L (ref 0.5–2)
LACTATE BLD-SCNC: 3 MMOL/L (ref 0.5–2)
LYMPHOCYTES # BLD AUTO: 0.39 K/UL (ref 1–4.8)
LYMPHOCYTES NFR BLD: 5.2 % (ref 22–41)
MAGNESIUM SERPL-MCNC: 1.6 MG/DL (ref 1.5–2.5)
MCH RBC QN AUTO: 29.9 PG (ref 27–33)
MCHC RBC AUTO-ENTMCNC: 32.3 G/DL (ref 33.6–35)
MCV RBC AUTO: 92.7 FL (ref 81.4–97.8)
MONOCYTES # BLD AUTO: 0.07 K/UL (ref 0–0.85)
MONOCYTES NFR BLD AUTO: 0.9 % (ref 0–13.4)
NEUTROPHILS # BLD AUTO: 7 K/UL (ref 2–7.15)
NEUTROPHILS NFR BLD: 92.4 % (ref 44–72)
NRBC # BLD AUTO: 0 K/UL
NRBC BLD-RTO: 0 /100 WBC
NT-PROBNP SERPL IA-MCNC: 624 PG/ML (ref 0–125)
PLATELET # BLD AUTO: 217 K/UL (ref 164–446)
PMV BLD AUTO: 9.2 FL (ref 9–12.9)
POTASSIUM SERPL-SCNC: 3.7 MMOL/L (ref 3.6–5.5)
PROCALCITONIN SERPL-MCNC: <0.05 NG/ML
PROT SERPL-MCNC: 6 G/DL (ref 6–8.2)
RBC # BLD AUTO: 3.68 M/UL (ref 4.2–5.4)
SODIUM SERPL-SCNC: 141 MMOL/L (ref 135–145)
WBC # BLD AUTO: 7.6 K/UL (ref 4.8–10.8)

## 2019-07-21 PROCEDURE — 94760 N-INVAS EAR/PLS OXIMETRY 1: CPT

## 2019-07-21 PROCEDURE — 700101 HCHG RX REV CODE 250: Performed by: INTERNAL MEDICINE

## 2019-07-21 PROCEDURE — 700105 HCHG RX REV CODE 258: Performed by: INTERNAL MEDICINE

## 2019-07-21 PROCEDURE — A9270 NON-COVERED ITEM OR SERVICE: HCPCS | Performed by: INTERNAL MEDICINE

## 2019-07-21 PROCEDURE — A9270 NON-COVERED ITEM OR SERVICE: HCPCS | Performed by: HOSPITALIST

## 2019-07-21 PROCEDURE — 99232 SBSQ HOSP IP/OBS MODERATE 35: CPT | Performed by: INTERNAL MEDICINE

## 2019-07-21 PROCEDURE — 83735 ASSAY OF MAGNESIUM: CPT

## 2019-07-21 PROCEDURE — 700111 HCHG RX REV CODE 636 W/ 250 OVERRIDE (IP): Performed by: HOSPITALIST

## 2019-07-21 PROCEDURE — 83880 ASSAY OF NATRIURETIC PEPTIDE: CPT

## 2019-07-21 PROCEDURE — 94640 AIRWAY INHALATION TREATMENT: CPT

## 2019-07-21 PROCEDURE — 700102 HCHG RX REV CODE 250 W/ 637 OVERRIDE(OP): Performed by: INTERNAL MEDICINE

## 2019-07-21 PROCEDURE — 80053 COMPREHEN METABOLIC PANEL: CPT

## 2019-07-21 PROCEDURE — 770020 HCHG ROOM/CARE - TELE (206)

## 2019-07-21 PROCEDURE — 700111 HCHG RX REV CODE 636 W/ 250 OVERRIDE (IP): Performed by: INTERNAL MEDICINE

## 2019-07-21 PROCEDURE — 700102 HCHG RX REV CODE 250 W/ 637 OVERRIDE(OP): Performed by: HOSPITALIST

## 2019-07-21 PROCEDURE — 85025 COMPLETE CBC W/AUTO DIFF WBC: CPT

## 2019-07-21 PROCEDURE — 87040 BLOOD CULTURE FOR BACTERIA: CPT

## 2019-07-21 PROCEDURE — 83605 ASSAY OF LACTIC ACID: CPT

## 2019-07-21 RX ORDER — IPRATROPIUM BROMIDE AND ALBUTEROL SULFATE 2.5; .5 MG/3ML; MG/3ML
3 SOLUTION RESPIRATORY (INHALATION)
Status: DISCONTINUED | OUTPATIENT
Start: 2019-07-22 | End: 2019-07-22

## 2019-07-21 RX ORDER — GABAPENTIN 300 MG/1
300 CAPSULE ORAL 3 TIMES DAILY
Status: DISCONTINUED | OUTPATIENT
Start: 2019-07-21 | End: 2019-07-25 | Stop reason: HOSPADM

## 2019-07-21 RX ORDER — SODIUM CHLORIDE 9 MG/ML
INJECTION, SOLUTION INTRAVENOUS ONCE
Status: COMPLETED | OUTPATIENT
Start: 2019-07-21 | End: 2019-07-21

## 2019-07-21 RX ORDER — GABAPENTIN 250 MG/5ML
300 SOLUTION ORAL 3 TIMES DAILY
Status: DISCONTINUED | OUTPATIENT
Start: 2019-07-21 | End: 2019-07-21

## 2019-07-21 RX ORDER — HYDRALAZINE HYDROCHLORIDE 20 MG/ML
10 INJECTION INTRAMUSCULAR; INTRAVENOUS ONCE
Status: COMPLETED | OUTPATIENT
Start: 2019-07-21 | End: 2019-07-21

## 2019-07-21 RX ORDER — OXYCODONE HYDROCHLORIDE 5 MG/1
15 TABLET ORAL 4 TIMES DAILY
Status: DISCONTINUED | OUTPATIENT
Start: 2019-07-21 | End: 2019-07-23

## 2019-07-21 RX ADMIN — LORAZEPAM 1 MG: 2 INJECTION INTRAMUSCULAR; INTRAVENOUS at 17:26

## 2019-07-21 RX ADMIN — METHYLPREDNISOLONE SODIUM SUCCINATE 125 MG: 125 INJECTION, POWDER, FOR SOLUTION INTRAMUSCULAR; INTRAVENOUS at 23:22

## 2019-07-21 RX ADMIN — OXYCODONE HYDROCHLORIDE 15 MG: 5 TABLET ORAL at 20:42

## 2019-07-21 RX ADMIN — IPRATROPIUM BROMIDE AND ALBUTEROL SULFATE 3 ML: .5; 3 SOLUTION RESPIRATORY (INHALATION) at 20:17

## 2019-07-21 RX ADMIN — SODIUM CHLORIDE: 9 INJECTION, SOLUTION INTRAVENOUS at 10:55

## 2019-07-21 RX ADMIN — AMPICILLIN SODIUM AND SULBACTAM SODIUM 3 G: 2; 1 INJECTION, POWDER, FOR SOLUTION INTRAMUSCULAR; INTRAVENOUS at 23:22

## 2019-07-21 RX ADMIN — LORAZEPAM 1 MG: 2 INJECTION INTRAMUSCULAR; INTRAVENOUS at 21:35

## 2019-07-21 RX ADMIN — METHYLPREDNISOLONE SODIUM SUCCINATE 125 MG: 125 INJECTION, POWDER, FOR SOLUTION INTRAMUSCULAR; INTRAVENOUS at 12:32

## 2019-07-21 RX ADMIN — METHYLPREDNISOLONE SODIUM SUCCINATE 125 MG: 125 INJECTION, POWDER, FOR SOLUTION INTRAMUSCULAR; INTRAVENOUS at 05:41

## 2019-07-21 RX ADMIN — DOXYCYCLINE 100 MG: 100 TABLET, FILM COATED ORAL at 05:41

## 2019-07-21 RX ADMIN — DOXYCYCLINE 100 MG: 100 TABLET, FILM COATED ORAL at 17:26

## 2019-07-21 RX ADMIN — GABAPENTIN 300 MG: 300 CAPSULE ORAL at 12:32

## 2019-07-21 RX ADMIN — IPRATROPIUM BROMIDE AND ALBUTEROL SULFATE 3 ML: .5; 3 SOLUTION RESPIRATORY (INHALATION) at 14:09

## 2019-07-21 RX ADMIN — AMPICILLIN SODIUM AND SULBACTAM SODIUM 3 G: 2; 1 INJECTION, POWDER, FOR SOLUTION INTRAMUSCULAR; INTRAVENOUS at 05:41

## 2019-07-21 RX ADMIN — POTASSIUM CHLORIDE AND SODIUM CHLORIDE: 900; 150 INJECTION, SOLUTION INTRAVENOUS at 05:45

## 2019-07-21 RX ADMIN — LORAZEPAM 1 MG: 2 INJECTION INTRAMUSCULAR; INTRAVENOUS at 12:32

## 2019-07-21 RX ADMIN — METHYLPREDNISOLONE SODIUM SUCCINATE 125 MG: 125 INJECTION, POWDER, FOR SOLUTION INTRAMUSCULAR; INTRAVENOUS at 17:35

## 2019-07-21 RX ADMIN — AMPICILLIN SODIUM AND SULBACTAM SODIUM 3 G: 2; 1 INJECTION, POWDER, FOR SOLUTION INTRAMUSCULAR; INTRAVENOUS at 12:32

## 2019-07-21 RX ADMIN — LORAZEPAM 1 MG: 2 INJECTION INTRAMUSCULAR; INTRAVENOUS at 07:43

## 2019-07-21 RX ADMIN — IPRATROPIUM BROMIDE AND ALBUTEROL SULFATE 3 ML: .5; 3 SOLUTION RESPIRATORY (INHALATION) at 10:02

## 2019-07-21 RX ADMIN — OXYCODONE HYDROCHLORIDE 15 MG: 5 TABLET ORAL at 17:26

## 2019-07-21 RX ADMIN — GUAIFENESIN 1200 MG: 600 TABLET, EXTENDED RELEASE ORAL at 17:26

## 2019-07-21 RX ADMIN — GABAPENTIN 300 MG: 300 CAPSULE ORAL at 17:26

## 2019-07-21 RX ADMIN — IPRATROPIUM BROMIDE AND ALBUTEROL SULFATE 3 ML: .5; 3 SOLUTION RESPIRATORY (INHALATION) at 02:14

## 2019-07-21 RX ADMIN — HYDRALAZINE HYDROCHLORIDE 10 MG: 20 INJECTION INTRAMUSCULAR; INTRAVENOUS at 22:17

## 2019-07-21 RX ADMIN — OXYCODONE HYDROCHLORIDE 15 MG: 5 TABLET ORAL at 10:55

## 2019-07-21 RX ADMIN — AMPICILLIN SODIUM AND SULBACTAM SODIUM 3 G: 2; 1 INJECTION, POWDER, FOR SOLUTION INTRAMUSCULAR; INTRAVENOUS at 17:26

## 2019-07-21 RX ADMIN — GABAPENTIN 300 MG: 300 CAPSULE ORAL at 06:32

## 2019-07-21 RX ADMIN — IPRATROPIUM BROMIDE AND ALBUTEROL SULFATE 3 ML: .5; 3 SOLUTION RESPIRATORY (INHALATION) at 06:35

## 2019-07-21 RX ADMIN — ENOXAPARIN SODIUM 40 MG: 100 INJECTION SUBCUTANEOUS at 05:48

## 2019-07-21 RX ADMIN — GUAIFENESIN 1200 MG: 600 TABLET, EXTENDED RELEASE ORAL at 05:41

## 2019-07-21 ASSESSMENT — ENCOUNTER SYMPTOMS
NERVOUS/ANXIOUS: 1
SHORTNESS OF BREATH: 1
BLOOD IN STOOL: 0
HEADACHES: 0
COUGH: 1
MYALGIAS: 0
NAUSEA: 0
SPUTUM PRODUCTION: 1
BACK PAIN: 0
PALPITATIONS: 0
DIZZINESS: 0
WHEEZING: 1
ABDOMINAL PAIN: 0
FEVER: 0
DIARRHEA: 0
VOMITING: 0
SORE THROAT: 0
HEARTBURN: 0
CHILLS: 0
HALLUCINATIONS: 0
WEAKNESS: 1
DEPRESSION: 0
FOCAL WEAKNESS: 0

## 2019-07-21 ASSESSMENT — LIFESTYLE VARIABLES: EVER_SMOKED: YES

## 2019-07-21 NOTE — ED TRIAGE NOTES
"Pt here with c/o    Chief Complaint   Patient presents with   • Shortness of Breath     x 7 hours treated for pneumonia and bronchitis 2 weeks ago   • Chest Pain     burning.  \"It hurts to breathe\"       /93   Pulse (!) 109   Temp 37.8 °C (100 °F) (Temporal)   Resp (!) 22   Ht 1.499 m (4' 11\")   Wt 51 kg (112 lb 7 oz)   SpO2 (!) 87%   BMI 22.71 kg/m²   "

## 2019-07-21 NOTE — ED PROVIDER NOTES
"ED Provider Note    CHIEF COMPLAINT  Chief Complaint   Patient presents with   • Shortness of Breath     x 7 hours treated for pneumonia and bronchitis 2 weeks ago   • Chest Pain     burning.  \"It hurts to breathe\"       HPI  Antonieta Mcdonald is a 64 y.o. female who presents to the Emergency Department with a history of smoking for 30 years, last cigarette today.  She states she was treated for pneumonia with Azithromycin and Albuterol in [place].  She came home and states she was never feeling like she could breathe well.  She has never had PFTs in the past.  She denies having a pulmonary embolism in the past, but states she had surgery a long time ago where a clot was removed from her leg over 44 years ago.  She has never had blood thinners.  She denies any objective fevers, but has had a feeling of fever, she last coughed up sputum 2 days ago.   No nasal congestion      REVIEW OF SYSTEMS  Positive for cough, shorntess of breath, Negative for leg swelling.  As above all other systems are negative.    PAST MEDICAL HISTORY   has a past medical history of Angina; Arthritis; ASTHMA; Back pain; Bowel habit changes; Bronchitis (1995); Cancer (HCC); Cataract; COPD; Dental disorder; Fall; Heart burn; Heart murmur; Hepatitis A (1980's); Hiatus hernia syndrome; Hypertension; Macular degeneration; Other specified symptom associated with female genital organs; Pain; PAIN DISORDER; Panic attack; Personal history of venous thrombosis and embolism; Pneumonia (1995); Psychiatric problem; and Snoring. She also has no past medical history of CAD (coronary artery disease); CATARACT; Liver disease; or Psychiatric disorder.    FAMILY HISTORY  No family history on file.     SOCIAL HISTORY  Social History     Social History Main Topics   • Smoking status: Former Smoker     Packs/day: 0.50     Years: 32.00     Types: Cigarettes     Start date: 1/1/1978     Quit date: 4/1/2017   • Smokeless tobacco: Never Used      Comment: 1 " "ppd times 30yrs   • Alcohol use No   • Drug use: No   • Sexual activity: Not on file       SURGICAL HISTORY   has a past surgical history that includes hysterectomy, total abdominal (1975); tonsillectomy (1957); primary c section (1972, 1973); colon resection (1983); cataract extraction with iol (2013); eye surgery (Bilateral, 1950's); laparotomy (1977); laparoscopy (1980's); gastroscopy-endo (N/A, 4/8/2016); egd w/endoscopic ultrasound (N/A, 4/8/2016); and egd with asp/bx (N/A, 4/8/2016).    CURRENT MEDICATIONS  Reviewed.  See Encounter Summary.  Include   Current Facility-Administered Medications:       Current Outpatient Prescriptions:   •  aspirin 81 MG tablet, Take 81 mg by mouth every day., Disp: , Rfl:   •  gabapentin (NEURONTIN) 300 MG Cap, Take 300 mg by mouth 3 times a day., Disp: , Rfl:   •  cyclobenzaprine (FLEXERIL) 10 MG Tab, Take 10 mg by mouth 3 times a day., Disp: , Rfl:     ALLERGIES  Allergies   Allergen Reactions   • Iodine Hives and Shortness of Breath     IVP dye   • Tape Itching     Peels skin off  Paper tape ok, if it is not on to long.       PHYSICAL EXAM  VITAL SIGNS: /93   Pulse 92   Temp 37.8 °C (100 °F) (Temporal)   Resp (!) 22   Ht 1.499 m (4' 11\")   Wt 51 kg (112 lb 7 oz)   SpO2 99%   BMI 22.71 kg/m²   Constitutional: Pulse ox 98% on room air, pursed lip breathing , able to answer questions  HENT: Nose is normal in appearance, external ears are normal,  moist mucous membranes  Eyes: Anicteric,  pupils are equal round and reactive, there is no conjunctival drainage or pallor   Neck: The trachea is midline, there is no obvious mass or meningeal signs  Cardiovascular: Good perfusion,  regular rate and rhythm without murmurs gallops or rubs  Thorax & Lungs: Respiratory rate and effort are labored. There is poor chest excursion with respiration. Bilateral wheezes inspiratory.  Abdomen: Abdomen is normal in appearance, no gross peritoneal signs  normal bowel sounds, no pain with " cough  :   No CVA tenderness to palpation  Musculoskeletal: No deformities noted in all 4 extremities.   Skin: Visualized skin is warm without rash.  N  Psychiatric: Normal mood and mentation    RADIOLOGY/PROCEDURES  Imaging Studies:    DX-CHEST-PORTABLE (1 VIEW)   Final Result      No acute cardiopulmonary abnormality.      US-EXTREMITY VENOUS LOWER BILAT    (Results Pending)         Pertinent Labs   Results for orders placed or performed during the hospital encounter of 07/20/19   CBC w/ Differential   Result Value Ref Range    WBC 5.8 4.8 - 10.8 K/uL    RBC 3.97 (L) 4.20 - 5.40 M/uL    Hemoglobin 12.0 12.0 - 16.0 g/dL    Hematocrit 36.8 (L) 37.0 - 47.0 %    MCV 92.7 81.4 - 97.8 fL    MCH 30.2 27.0 - 33.0 pg    MCHC 32.6 (L) 33.6 - 35.0 g/dL    RDW 53.5 (H) 35.9 - 50.0 fL    Platelet Count 241 164 - 446 K/uL    MPV 9.1 9.0 - 12.9 fL    Neutrophils-Polys 49.60 44.00 - 72.00 %    Lymphocytes 42.30 (H) 22.00 - 41.00 %    Monocytes 5.00 0.00 - 13.40 %    Eosinophils 1.20 0.00 - 6.90 %    Basophils 1.40 0.00 - 1.80 %    Immature Granulocytes 0.50 0.00 - 0.90 %    Nucleated RBC 0.00 /100 WBC    Neutrophils (Absolute) 2.90 2.00 - 7.15 K/uL    Lymphs (Absolute) 2.47 1.00 - 4.80 K/uL    Monos (Absolute) 0.29 0.00 - 0.85 K/uL    Eos (Absolute) 0.07 0.00 - 0.51 K/uL    Baso (Absolute) 0.08 0.00 - 0.12 K/uL    Immature Granulocytes (abs) 0.03 0.00 - 0.11 K/uL    NRBC (Absolute) 0.00 K/uL   Complete Metabolic Panel (CMP)   Result Value Ref Range    Sodium 146 (H) 135 - 145 mmol/L    Potassium 3.3 (L) 3.6 - 5.5 mmol/L    Chloride 107 96 - 112 mmol/L    Co2 23 20 - 33 mmol/L    Anion Gap 16.0 (H) 0.0 - 11.9    Glucose 109 (H) 65 - 99 mg/dL    Bun 12 8 - 22 mg/dL    Creatinine 0.77 0.50 - 1.40 mg/dL    Calcium 8.7 8.4 - 10.2 mg/dL    AST(SGOT) 17 12 - 45 U/L    ALT(SGPT) 11 2 - 50 U/L    Alkaline Phosphatase 101 (H) 30 - 99 U/L    Total Bilirubin 0.4 0.1 - 1.5 mg/dL    Albumin 4.2 3.2 - 4.9 g/dL    Total Protein 7.4 6.0 - 8.2  g/dL    Globulin 3.2 1.9 - 3.5 g/dL    A-G Ratio 1.3 g/dL   proBrain Natriuretic Peptide, NT   Result Value Ref Range    NT-proBNP 115 0 - 125 pg/mL   Troponin STAT   Result Value Ref Range    Troponin T 7 6 - 19 ng/L   LACTIC ACID   Result Value Ref Range    Lactic Acid 2.2 (H) 0.5 - 2.0 mmol/L   PT/INR   Result Value Ref Range    PT 12.1 12.0 - 14.6 sec    INR 0.89 0.87 - 1.13   D-Dimer (only helpful in low pre-test probability wells critieria. Do not order if patient ruled out by PERC criteria. See Weblinks at top of Labs section)   Result Value Ref Range    D-Dimer Screen 0.73 (H) 0.00 - 0.50 ug/mL (FEU)   ESTIMATED GFR   Result Value Ref Range    GFR If African American >60 >60 mL/min/1.73 m 2    GFR If Non African American >60 >60 mL/min/1.73 m 2         OURSE & MEDICAL DECISION MAKING  Nursing notes and vital signs were reviewed. (See chart for details)  The patients records were reviewed, history was obtained from the patient;     The patient presents with shortness of breath, and the differential diagnosis includes but is not limited to COPD exacerbation, pneumonia, less likely PE anxiety or ACS.       Initial orders in the Emergency Department included sepsis protocol, troponin, BNP, EKG and initial treatment in the Emergency Department included IV Solumdedrol and Duoneb treatment and the patient received IV  NS as lactic acid was elevatedd.  Question of pneumonia with sputum production, will cover with Unasyn at this time with IL NS as elevated lactic acid and hypoxemia..    ED testing reveals persistent hypoxemia despite neb treatment, US shows no DVT, suspect COPD exacerbation, patient agrees to admission      FINAL IMPRESSION  1. HYPOXEMIA  2. COPD Exacerbation  3.  Lactic Acidosis       DISPOSITION  Admit    Electronically signed by: Hailey Mata, 7/20/2019 7:50 PM

## 2019-07-21 NOTE — FLOWSHEET NOTE
07/21/19 1410   Interdisciplinary Plan of Care-Goals (Indications)   Bronchodilator Indications History / Diagnosis   Interdisciplinary Plan of Care-Outcomes    Bronchodilator Outcome Diminished Wheezing and Volume of Air Movement Increased   Education   Education Yes - Pt. / Family has been Instructed in use of Respiratory Medications and Adverse Reactions   RT Assessment of Delivered Medications   Evaluation of Medication Delivery Daily Yes-- Pt /Family has been Instructed in use of Respiratory Medications and Adverse Reactions   SVN Group   #SVN Performed Yes   Given By: Mouthpiece   Respiratory WDL   Respiratory (WDL) X   Chest Exam   Chest Observation Barrel Chest   Work Of Breathing / Effort Mild   Respiration 16   Pulse 97   Breath Sounds   Pre/Post Intervention Pre Intervention Assessment   RUL Breath Sounds Clear   RML Breath Sounds Expiratory Wheezes   RLL Breath Sounds Diminished   RODY Breath Sounds Clear   LLL Breath Sounds Expiratory Wheezes   Oximetry   #Pulse Oximetry (Single Determination) Yes   Oxygen   Pulse Oximetry 92 %   O2 (LPM) 0   O2 Daily Delivery Respiratory  Room Air with O2 Available

## 2019-07-21 NOTE — H&P
"Hospital Medicine History & Physical Note    Date of Service  7/20/2019    Primary Care Physician  Germaine Schrader M.D.    Consultants  none    Code Status  She does not want to be intubated so DNR    Chief Complaint  Cough SOB    History of Presenting Illness  64 y.o. female who presented 7/20/2019 with cough and shortness of breath over the last 3 weeks.  She was originally placed on Z-Hiram when she was visiting her children in [place] she also used albuterol at the time she was coughing up green sputum, since that time she has not had improvement in her cough or her shortness of breath she said fevers up to 101 along with sweats and chills her appetite is been poor but she has had no overt nausea or vomiting she says she has a headache due to her shortness of breath as well as chest pain due to shortness of breath and persistent coughing she at times also feels dizzy.  They have been [place] their way [place] and [place] from [facility] which took 4 days due to the fact that they lost a couple of their cats in the process and were trying to ermelinda them to get them back.  Apparently some time during the journey she also resumed drinking which previously she had stopped.  She denies drug use she continues to smoke 1/2 pack/day      Review of Systems  Review of Systems   Constitutional: Positive for weight loss. Negative for chills, diaphoresis, fever and malaise/fatigue.   HENT: Negative for congestion and sore throat.    Eyes: Negative for discharge and redness.   Respiratory: Positive for cough and sputum production (prior green but none last few days).    Cardiovascular: Positive for chest pain (2/2 coughing and SOB \" not heart pain\").   Gastrointestinal: Negative for nausea and vomiting.        Poor appetite   Genitourinary: Negative for dysuria, frequency and hematuria.   Musculoskeletal: Negative for joint pain and myalgias.   Skin: Negative for itching and rash.   Neurological: Positive for dizziness and " headaches (2/2 SOB). Negative for weakness.   Psychiatric/Behavioral: Negative for depression and substance abuse. The patient is nervous/anxious.         ? EtOH relapse       Past Medical History   has a past medical history of Alcohol abuse (7/20/2019); Angina; Arthritis; ASTHMA; Back pain; Bowel habit changes; Bronchitis (1995); Cancer (HCC); Cataract; COPD; Dental disorder; Fall; Heart burn; Heart murmur; Hepatitis A (1980's); Hiatus hernia syndrome; Hypertension; Macular degeneration; Other specified symptom associated with female genital organs; Pain; PAIN DISORDER; Panic attack; Personal history of venous thrombosis and embolism; Pneumonia (1995); Psychiatric problem; and Snoring. She also has no past medical history of CAD (coronary artery disease); CATARACT; or Liver disease.    Surgical History   has a past surgical history that includes hysterectomy, total abdominal (1975); tonsillectomy (1957); primary c section (1972, 1973); colon resection (1983); cataract extraction with iol (2013); eye surgery (Bilateral, 1950's); laparotomy (1977); laparoscopy (1980's); gastroscopy-endo (N/A, 4/8/2016); egd w/endoscopic ultrasound (N/A, 4/8/2016); and egd with asp/bx (N/A, 4/8/2016).     Family History  Lung cancer, esophageal cancer, heart disease  No diabetes    Social History   reports that she quit smoking about 2 years ago. Her smoking use included Cigarettes. She started smoking about 41 years ago. She has a 16.00 pack-year smoking history. She has never used smokeless tobacco. She reports that she does not drink alcohol or use drugs.  Lives with her spouse, has 2 children in [place].  She indicates that she had previously abstain from alcohol but had a relapse during her trip down from [place] but will not quantify how much she has been drinking.    Allergies  Allergies   Allergen Reactions   • Iodine Hives and Shortness of Breath     IVP dye   • Tape Itching     Peels skin off  Paper tape ok, if it is not on  to long.       Medications  Prior to Admission Medications   Prescriptions Last Dose Informant Patient Reported? Taking?   aspirin 81 MG tablet   Yes No   Sig: Take 81 mg by mouth every day.   cyclobenzaprine (FLEXERIL) 10 MG Tab  Patient Yes No   Sig: Take 10 mg by mouth 3 times a day.   gabapentin (NEURONTIN) 300 MG Cap  Patient Yes No   Sig: Take 300 mg by mouth 3 times a day.      Facility-Administered Medications: None       Physical Exam  Temp:  [37.8 °C (100 °F)] 37.8 °C (100 °F)  Pulse:  [] 92  Resp:  [22] 22  BP: (155)/(93) 155/93  SpO2:  [87 %-99 %] 99 %    Physical Exam   Constitutional: She is oriented to person, place, and time. She appears well-developed. No distress.   Thin, mildly disheveled   HENT:   Head: Normocephalic and atraumatic.   Mouth/Throat: Oropharynx is clear and moist.   Poor dentition   Eyes: Pupils are equal, round, and reactive to light. Conjunctivae and EOM are normal. Right eye exhibits no discharge. Left eye exhibits no discharge. No scleral icterus.   Neck: Neck supple.   Cardiovascular: Normal rate and regular rhythm.    Pulmonary/Chest: No respiratory distress. She has wheezes. She has no rales. She exhibits no tenderness.   Diffuse coarse wheezes throughout with persistent coarse coughing   Abdominal: Soft. Bowel sounds are normal. She exhibits no distension. There is no tenderness.   Musculoskeletal: She exhibits no edema or tenderness.   Neurological: She is alert and oriented to person, place, and time.   patient is extremely fidgety   Skin: Skin is warm and dry. She is not diaphoretic.   Psychiatric:   anxious   Nursing note and vitals reviewed.      Laboratory:  Recent Labs      07/20/19 2005   WBC  5.8   RBC  3.97*   HEMOGLOBIN  12.0   HEMATOCRIT  36.8*   MCV  92.7   MCH  30.2   MCHC  32.6*   RDW  53.5*   PLATELETCT  241   MPV  9.1     Recent Labs      07/20/19 2005   SODIUM  146*   POTASSIUM  3.3*   CHLORIDE  107   CO2  23   GLUCOSE  109*   BUN  12    CREATININE  0.77   CALCIUM  8.7     Recent Labs      07/20/19 2005   ALTSGPT  11   ASTSGOT  17   ALKPHOSPHAT  101*   TBILIRUBIN  0.4   GLUCOSE  109*     Recent Labs      07/20/19 2005   INR  0.89     Recent Labs      07/20/19 2005   NTPROBNP  115         Recent Labs      07/20/19 2005   TROPONINT  7       Urinalysis:    No results found     Imaging:  US-EXTREMITY VENOUS LOWER BILAT   Final Result      No evidence of Bilateral  lower extremity deep venous thrombosis.            INTERPRETING LOCATION: [address] [facility], [place], [address]      DX-CHEST-PORTABLE (1 VIEW)   Final Result      No acute cardiopulmonary abnormality.            Assessment/Plan:  I anticipate this patient will require at least two midnights for appropriate medical management, necessitating inpatient admission.    Hypernatremia   Assessment & Plan    IVF     Hypokalemia   Assessment & Plan    NS 20 K+  Check Mg     Alcohol abuse   Assessment & Plan    Patient previously was abstinent but relapsed during her trip from [place]  She will not quantify her current alcohol use     Anxiety   Assessment & Plan    Patient is very fidgety presumably from steroids however will check urine toxicology  Ativan as needed     Acute respiratory failure with hypoxemia (HCC)   Assessment & Plan    Due to COPD exacerbation cannot rule out pneumonia but chest x-ray appears to be clear  However given her fever, will treat with Unasyn and doxycycline along with RT protocol and mucolytics     Acute exacerbation of chronic obstructive pulmonary disease (COPD) (HCC)   Assessment & Plan    IV Solu-Medrol, RT protocol, mucolytics  Antibiotics due to fever     Tobacco abuse- (present on admission)   Assessment & Plan    Tobacco cessation counseling per RT and COPD coordinator         VTE prophylaxis: lovenox

## 2019-07-21 NOTE — PROGRESS NOTES
"2 RN skin check complete.   Devices in place Nasal cannula.  Skin assessed under devices: Intact without redness.  Confirmed pressure ulcers found on N/A.  New potential pressure ulcers noted on N/A. Wound consult placed N/A.      Pt is fully independent and able to turn and reposition self, ambulates occasionally. Skin is intact with no redness over bony prominences. Small 0.25\" dry scab to left plantar foot.   "

## 2019-07-21 NOTE — CARE PLAN
Problem: Communication  Goal: The ability to communicate needs accurately and effectively will improve  Outcome: PROGRESSING AS EXPECTED    Intervention: [place] patient and significant other/support system to call light to alert staff of needs   07/21/19 0135   OTHER   Oriented to: All of the Following : Location of Bathroom, Visiting Policy, Unit Routine, Call Light and Bedside Controls, Bedside Rail Policy, Smoking Policy, Rights and Responsibilities, Bedside Report, and Patient Education Notebook;Location of bathroom;Visiting Policy;Unit Routine;Call Light & Bedside Controls;Bedside Rail Policy;Smoking Policy         Problem: Safety  Goal: Will remain free from falls  Outcome: PROGRESSING AS EXPECTED    Intervention: Assess risk factors for falls   07/21/19 0135   OTHER   Fall Risk Risk to Fall - 0 - 1 point   Risk for Injury-Any positive answers results in the pt being at high risk for fall related injury Not Applicable   Mobility Status Assessment 0-Ambulates & Transfers Independently. No Assistance Required   History of fall 0   Pt Calls for Assistance Yes     Intervention: Implement fall precautions   07/21/19 0135   OTHER   Environmental Precautions Treaded Slipper Socks on Patient;Personal Belongings, Wastebasket, Call Bell etc. in Easy Reach;Communication Sign for Patients & Families;Bed in Low Position;Report Given to Other Health Care Providers Regarding Fall Risk;Mobility Assessed & Appropriate Sign Placed   IV Pole on Same Side of Bed as Bathroom Yes

## 2019-07-21 NOTE — ASSESSMENT & PLAN NOTE
Patient is very fidgety presumably from steroids and breathing treatments  Slightly better off iv steroids, will ambulate today

## 2019-07-21 NOTE — FLOWSHEET NOTE
07/20/19 2045   Type of Assessment   Assessment Yes   Patient History   Pulmonary Diagnosis COPD   Home O2 Use Prior To Admission? No   Home Treatments/Frequency No   COPD Risk Screening   Do you have a history of COPD? No   COPD Population Screener   During the past 4 weeks, how much did you feel short of breath? 1   Do you ever cough up any mucus or phlegm? 0   In the past 12 months, you do less than you used to because of your breathing problems 0   Have you smoked at least 100 cigarettes in your entire life? 2   How old are you? 2   COPD Screening Score 5   COPD Coordinator Recommended Yes   Smoking History   Have you ever smoked Yes   Have you smoked in the last 12 months Yes   Have you quit smoking No   Do you have any pipes/cigarettes/lighter/matches/etc in your possesion No   Level Of Consciousness   Level of Consciousness Alert   Respiratory WDL   Respiratory (WDL) X   Chest Exam   Chest Observation Barrel Chest   Work Of Breathing / Effort Moderate   Respiration (!) 22   Pulse 98   Heart Rate (Monitored) 97   Breath Sounds   Pre/Post Intervention Pre Intervention Assessment   RUL Breath Sounds Expiratory Wheezes   RML Breath Sounds Expiratory Wheezes   RLL Breath Sounds Expiratory Wheezes   RODY Breath Sounds Expiratory Wheezes   LLL Breath Sounds Expiratory Wheezes   Secretions   Cough Dry;Congested   Protocol Pathways   Protocol Pathways Yes   Bronchodilator Protocol   Med Order With RCP No   Is this an exacerbation of COPD/Asthma? Yes - Bronchodilators Q4 hours for 24 hours   Bronchodilator Indications History / Diagnosis   Breath Sounds Criteria 2    Benefit Criteria 1    Pulse Criteria 0    Respiratory Rate Criteria 1    S.O.B. Criteria 1    Criteria Total 5   Point Values 7-9 - Q4 and PRN   Bronchodilator Goals/Outcome Diminished Wheezing and Volume of Air Movement Increased   O2 Protocol   O2 (LPM) 3   Pulse Oximetry 96 %

## 2019-07-21 NOTE — PROGRESS NOTES
[facility] Medicine Daily Progress Note    Date of Service  7/21/2019    Chief Complaint  64 y.o. female admitted 7/20/2019 with SOB    Hospital Course    History of tobacco, alcohol use, anxiety admitted for shortness of breath found to have COPD exacerbation and likely pneumonia      Interval Problem Update  7/21: Tolerating Solu-Medrol but it makes her anxious, treating with Ativan.  Tolerating Unasyn and doxy.  Still wheezy, on 3 L    Consultants/Specialty  None    Code Status  DNR/DNI    Disposition  Wean oxygen  Need outpatient PFTs in 6 to 8 weeks  Needs maintenance inhaler on discharge    Review of Systems  Review of Systems   Constitutional: Positive for malaise/fatigue. Negative for chills and fever.   HENT: Negative for sore throat.    Respiratory: Positive for cough, sputum production, shortness of breath and wheezing.    Cardiovascular: Negative for chest pain and palpitations.   Gastrointestinal: Negative for abdominal pain, blood in stool, diarrhea, heartburn, nausea and vomiting.   Genitourinary: Negative for dysuria and frequency.   Musculoskeletal: Negative for back pain and myalgias.   Neurological: Positive for weakness. Negative for dizziness, focal weakness and headaches.   Psychiatric/Behavioral: Negative for depression and hallucinations. The patient is nervous/anxious.    All other systems reviewed and are negative.       Physical Exam  Temp:  [36.7 °C (98.1 °F)-37.8 °C (100 °F)] 36.8 °C (98.2 °F)  Pulse:  [] 97  Resp:  [16-22] 16  BP: (122-162)/(56-96) 122/56  SpO2:  [87 %-99 %] 92 %    Physical Exam   Constitutional: She is oriented to person, place, and time. She appears well-developed and well-nourished. She appears distressed.   HENT:   Head: Normocephalic.   Mouth/Throat: No oropharyngeal exudate.   Eyes: Pupils are equal, round, and reactive to light. No scleral icterus.   Neck: Normal range of motion. Neck supple. No JVD present. No thyromegaly present.   Cardiovascular: Normal  rate and regular rhythm.    No murmur heard.  Pulmonary/Chest: Effort normal. She has wheezes. She has rales.   Abdominal: Soft. Bowel sounds are normal. She exhibits no distension. There is no tenderness.   Musculoskeletal: She exhibits edema (Trace). She exhibits no tenderness.   Neurological: She is alert and oriented to person, place, and time. She displays tremor. No cranial nerve deficit. Coordination normal.   Skin: Skin is warm and dry. No rash noted. No erythema.   Psychiatric:   Anxious       Fluids    Intake/Output Summary (Last 24 hours) at 07/21/19 1548  Last data filed at 07/21/19 1331   Gross per 24 hour   Intake             2960 ml   Output                0 ml   Net             2960 ml       Laboratory  Recent Labs      07/20/19 2005 07/21/19 0426   WBC  5.8  7.6   RBC  3.97*  3.68*   HEMOGLOBIN  12.0  11.0*   HEMATOCRIT  36.8*  34.1*   MCV  92.7  92.7   MCH  30.2  29.9   MCHC  32.6*  32.3*   RDW  53.5*  53.4*   PLATELETCT  241  217   MPV  9.1  9.2     Recent Labs      07/20/19 2005 07/21/19 0426   SODIUM  146*  141   POTASSIUM  3.3*  3.7   CHLORIDE  107  113*   CO2  23  20   GLUCOSE  109*  144*   BUN  12  13   CREATININE  0.77  0.79   CALCIUM  8.7  7.9*     Recent Labs      07/20/19 2005   INR  0.89               Imaging  US-EXTREMITY VENOUS LOWER BILAT   Final Result      No evidence of Bilateral  lower extremity deep venous thrombosis.            INTERPRETING LOCATION: [address] [place] [address], [address]      DX-CHEST-PORTABLE (1 VIEW)   Final Result      No acute cardiopulmonary abnormality.      EC-ECHOCARDIOGRAM COMPLETE W/O CONT    (Results Pending)        Assessment/Plan  * Acute exacerbation of chronic obstructive pulmonary disease (COPD) ([facility])   Assessment & Plan    IV Solu-Medrol, RT protocol, mucolytics  Antibiotics due to fever  She has never had pulmonary function testing, I recommended this be done as an outpatient in 6 to 8 weeks  I will likely prescribe maintenance inhaler on  discharge  Mucinex  DuoNeb's     Hypernatremia   Assessment & Plan    IVF     Hypokalemia   Assessment & Plan    NS 20 K+  Check Mg     Alcohol abuse   Assessment & Plan    Patient previously was abstinent but relapsed during her trip from [place]  She will not quantify her current alcohol use  Watch for etoh withdrawal     Anxiety   Assessment & Plan    Patient is very fidgety presumably from steroids however will check urine toxicology  Ativan as needed  Watch for etoh withdrawal     Acute respiratory failure with hypoxemia (HCC)   Assessment & Plan    Due to COPD exacerbation cannot rule out pneumonia  Continue Unasyn and doxy     Tobacco abuse- (present on admission)   Assessment & Plan    Tobacco cessation for 5min, [id number]  Patch and gum          VTE prophylaxis: Lovenox

## 2019-07-21 NOTE — FLOWSHEET NOTE
07/20/19 2045   Type of Assessment   Assessment Yes   Patient History   Home O2 Use Prior To Admission? No   Home Treatments/Frequency No   COPD Risk Screening   Do you have a history of COPD? No   COPD Population Screener   During the past 4 weeks, how much did you feel short of breath? 1   Do you ever cough up any mucus or phlegm? 0   In the past 12 months, you do less than you used to because of your breathing problems 0   Have you smoked at least 100 cigarettes in your entire life? 2   How old are you? 1   COPD Screening Score 5   COPD Coordinator Recommended Yes   Smoking History   Have you ever smoked Yes   Have you smoked in the last 12 months Yes   Have you quit smoking No   Do you have any pipes/cigarettes/lighter/matches/etc in your possesion No   Level Of Consciousness   Level of Consciousness Alert   Respiratory WDL   Respiratory (WDL) X   Chest Exam   Chest Observation Barrel Chest   Work Of Breathing / Effort Moderate   Respiration (!) 22   Pulse 98   Heart Rate (Monitored) 97   Breath Sounds   Pre/Post Intervention Pre Intervention Assessment   RUL Breath Sounds Expiratory Wheezes   RML Breath Sounds Expiratory Wheezes   RLL Breath Sounds Expiratory Wheezes   RODY Breath Sounds Expiratory Wheezes   LLL Breath Sounds Expiratory Wheezes   Secretions   Cough Dry;Congested   Bronchodilator Protocol   Bronchodilator Indications History / Diagnosis   O2 Protocol   O2 (LPM) 3   Pulse Oximetry 96 %

## 2019-07-21 NOTE — ASSESSMENT & PLAN NOTE
Wheezing improving slowly, oxygen saturation is slowly improving with 94% measured on room air at rest today  Continue oral steroids, will need prolonged taper  Continue symbicort and xopenex

## 2019-07-21 NOTE — PROGRESS NOTES
"Patient requested for ativan to be given with steroids because the steroids makes her \"jittery\". Bed in low locked position, call bell within reach. Continue to monitor.  "

## 2019-07-21 NOTE — PROGRESS NOTES
"2300: Pt arrived to unit via gurney. Ambulated from gurney to bed by self steady gait. Tele monitor applied, vitals taken. Pt assessed. A&O x4. Admit profile and med rec complete. Discussed POC with pt, including Abx, IVF w, IV steroids and Scheduled RT. Welcome folder provided and discussed. Communication board filled out. Questions and concerns addressed, verbalized understanding. Fall precautions in place. Pt demonstrates ability to use call light appropriately. Pt is a \"low risk for falls\" bed alarm not indicated, pt verbalizes understanding to use call light for staff SBA to BR.   0045: Repeat Lactic 2.5 from 2.2. Dr Do notified and updated on hypernatremia and V/S. No further orders at this time. x2 more Lactic  scheduled.   0530: Pt stated slept on and off throughout the night. Voices no questions or concerns at this time.   0700: Report given to Vanessa DARNELL.   "

## 2019-07-21 NOTE — FLOWSHEET NOTE
07/21/19 1002   Interdisciplinary Plan of Care-Outcomes    Bronchodilator Outcome Diminished Wheezing and Volume of Air Movement Increased   Education   Education Yes - Pt. / Family has been Instructed in use of Respiratory Medications and Adverse Reactions   RT Assessment of Delivered Medications   Evaluation of Medication Delivery Daily Yes-- Pt /Family has been Instructed in use of Respiratory Medications and Adverse Reactions   SVN Group   #SVN Performed Yes   Given By: Mouthpiece   Respiratory WDL   Respiratory (WDL) X   Chest Exam   Work Of Breathing / Effort Mild   Respiration 17   Pulse 98   Breath Sounds   Pre/Post Intervention Pre Intervention Assessment   RUL Breath Sounds Expiratory Wheezes   RML Breath Sounds Expiratory Wheezes   RLL Breath Sounds Expiratory Wheezes   RODY Breath Sounds Expiratory Wheezes   LLL Breath Sounds Expiratory Wheezes   Oximetry   #Pulse Oximetry (Single Determination) Yes   Oxygen   Pulse Oximetry 94 %   O2 (LPM) 2   O2 Daily Delivery Respiratory  Nasal Cannula

## 2019-07-21 NOTE — FLOWSHEET NOTE
07/21/19 0635   Interdisciplinary Plan of Care-Goals (Indications)   Bronchodilator Indications History / Diagnosis   Interdisciplinary Plan of Care-Outcomes    Bronchodilator Outcome Diminished Wheezing and Volume of Air Movement Increased   Education   Education Yes - Pt. / Family has been Instructed in use of Respiratory Medications and Adverse Reactions   RT Assessment of Delivered Medications   Evaluation of Medication Delivery Daily Yes-- Pt /Family has been Instructed in use of Respiratory Medications and Adverse Reactions   SVN Group   #SVN Performed Yes   Given By: Mouthpiece   Respiratory WDL   Respiratory (WDL) X   Chest Exam   Chest Observation Barrel Chest   Work Of Breathing / Effort Mild   Respiration 19   Pulse 97   Breath Sounds   Pre/Post Intervention Pre Intervention Assessment   RUL Breath Sounds Expiratory Wheezes   RML Breath Sounds Clear   RLL Breath Sounds Expiratory Wheezes   RODY Breath Sounds Expiratory Wheezes   LLL Breath Sounds Diminished   Oximetry   #Pulse Oximetry (Single Determination) Yes   Oxygen   Pulse Oximetry 95 %   O2 (LPM) 3   O2 Daily Delivery Respiratory  Nasal Cannula

## 2019-07-21 NOTE — FLOWSHEET NOTE
07/21/19 0214   Interdisciplinary Plan of Care-Goals (Indications)   Bronchodilator Indications History / Diagnosis   Interdisciplinary Plan of Care-Outcomes    Bronchodilator Outcome Diminished Wheezing and Volume of Air Movement Increased   Education   Education Yes - Pt. / Family has been Instructed in use of Respiratory Equipment;Yes - Pt. / Family has been Instructed in use of Respiratory Medications and Adverse Reactions   RT Assessment of Delivered Medications   Evaluation of Medication Delivery Daily Yes-- Pt /Family has been Instructed in use of Respiratory Medications and Adverse Reactions   SVN Group   #SVN Performed Yes   Given By: Mouthpiece   Respiratory WDL   Respiratory (WDL) X   Chest Exam   Work Of Breathing / Effort Mild   Respiration 19   Pulse 97   Breath Sounds   Pre/Post Intervention Pre Intervention Assessment   RUL Breath Sounds Expiratory Wheezes;Diminished   RODY Breath Sounds Expiratory Wheezes;Diminished   Secretions   Cough Congested   Oximetry   #Pulse Oximetry (Single Determination) Yes   Oxygen   Pulse Oximetry 92 %   O2 (LPM) 0  (found on room air)   O2 Daily Delivery Respiratory  Room Air with O2 Available

## 2019-07-21 NOTE — PROGRESS NOTES
Telemetry Shift Summary    Rhythm SR ST  HR Range   Ectopy oPVC  Measurements 0.16/0.06/0.38        Normal Values  Rhythm SR  HR Range    Measurements 0.12-0.20 / 0.06-0.10  / 0.30-0.52

## 2019-07-21 NOTE — PROGRESS NOTES
Report received from Ana DARNELL. No new changes. patient needed ativan after steroids given. patient stated she still does not feel good. IVF running. Bed in low locked position, call bell within reach. Continue to monitor.

## 2019-07-21 NOTE — FLOWSHEET NOTE
07/20/19 2045   Interdisciplinary Plan of Care-Goals (Indications)   Bronchodilator Indications History / Diagnosis   Interdisciplinary Plan of Care-Outcomes    Bronchodilator Outcome Diminished Wheezing and Volume of Air Movement Increased   Education   Education Yes - Pt. / Family has been Instructed in use of Respiratory Equipment;Yes - Pt. / Family has been Instructed in use of Respiratory Medications and Adverse Reactions   RT Assessment of Delivered Medications   Evaluation of Medication Delivery Daily Yes-- Pt /Family has been Instructed in use of Respiratory Medications and Adverse Reactions   SVN Group   #SVN Performed Yes   Given By: Mouthpiece   Date SVN Last Changed 07/20/19   Date SVN Next Change Due (Q 7 Days) 07/27/19   Respiratory WDL   Respiratory (WDL) X   Chest Exam   Chest Observation Barrel Chest   Work Of Breathing / Effort Moderate   Respiration (!) 22   Pulse 98   Heart Rate (Monitored) 97   Breath Sounds   Pre/Post Intervention Pre Intervention Assessment   RUL Breath Sounds Expiratory Wheezes   RML Breath Sounds Expiratory Wheezes   RLL Breath Sounds Expiratory Wheezes   RODY Breath Sounds Expiratory Wheezes   LLL Breath Sounds Expiratory Wheezes   Secretions   Cough Dry;Congested   Oximetry   #Pulse Oximetry (Single Determination) Yes   Oxygen   Home O2 Use Prior To Admission? No   Pulse Oximetry 96 %   O2 (LPM) 3

## 2019-07-21 NOTE — ASSESSMENT & PLAN NOTE
Patient previously was abstinent but relapsed during her trip from [place]  No withdrawal symptoms

## 2019-07-22 ENCOUNTER — APPOINTMENT (OUTPATIENT)
Dept: CARDIOLOGY | Facility: MEDICAL CENTER | Age: 64
DRG: 190 | End: 2019-07-22
Attending: INTERNAL MEDICINE
Payer: MEDICARE

## 2019-07-22 LAB
ALBUMIN SERPL BCP-MCNC: 3.1 G/DL (ref 3.2–4.9)
BASOPHILS # BLD AUTO: 0.1 % (ref 0–1.8)
BASOPHILS # BLD: 0.01 K/UL (ref 0–0.12)
BUN SERPL-MCNC: 12 MG/DL (ref 8–22)
CALCIUM SERPL-MCNC: 7.8 MG/DL (ref 8.4–10.2)
CHLORIDE SERPL-SCNC: 109 MMOL/L (ref 96–112)
CO2 SERPL-SCNC: 21 MMOL/L (ref 20–33)
CREAT SERPL-MCNC: 0.71 MG/DL (ref 0.5–1.4)
EOSINOPHIL # BLD AUTO: 0.05 K/UL (ref 0–0.51)
EOSINOPHIL NFR BLD: 0.5 % (ref 0–6.9)
ERYTHROCYTE [DISTWIDTH] IN BLOOD BY AUTOMATED COUNT: 56.8 FL (ref 35.9–50)
GLUCOSE SERPL-MCNC: 154 MG/DL (ref 65–99)
HCT VFR BLD AUTO: 33.7 % (ref 37–47)
HGB BLD-MCNC: 10.8 G/DL (ref 12–16)
IMM GRANULOCYTES # BLD AUTO: 0.13 K/UL (ref 0–0.11)
IMM GRANULOCYTES NFR BLD AUTO: 1.2 % (ref 0–0.9)
LV EJECT FRACT  99904: 65
LV EJECT FRACT MOD 2C 99903: 74.4
LV EJECT FRACT MOD 4C 99902: 70.06
LV EJECT FRACT MOD BP 99901: 72
LYMPHOCYTES # BLD AUTO: 0.28 K/UL (ref 1–4.8)
LYMPHOCYTES NFR BLD: 2.6 % (ref 22–41)
MCH RBC QN AUTO: 30.7 PG (ref 27–33)
MCHC RBC AUTO-ENTMCNC: 32 G/DL (ref 33.6–35)
MCV RBC AUTO: 95.7 FL (ref 81.4–97.8)
MONOCYTES # BLD AUTO: 0.21 K/UL (ref 0–0.85)
MONOCYTES NFR BLD AUTO: 2 % (ref 0–13.4)
NEUTROPHILS # BLD AUTO: 9.92 K/UL (ref 2–7.15)
NEUTROPHILS NFR BLD: 93.6 % (ref 44–72)
NRBC # BLD AUTO: 0 K/UL
NRBC BLD-RTO: 0 /100 WBC
PHOSPHATE SERPL-MCNC: 3.2 MG/DL (ref 2.5–4.5)
PLATELET # BLD AUTO: 196 K/UL (ref 164–446)
PMV BLD AUTO: 9.9 FL (ref 9–12.9)
POTASSIUM SERPL-SCNC: 3.9 MMOL/L (ref 3.6–5.5)
RBC # BLD AUTO: 3.52 M/UL (ref 4.2–5.4)
SODIUM SERPL-SCNC: 139 MMOL/L (ref 135–145)
WBC # BLD AUTO: 10.6 K/UL (ref 4.8–10.8)

## 2019-07-22 PROCEDURE — 99232 SBSQ HOSP IP/OBS MODERATE 35: CPT | Performed by: INTERNAL MEDICINE

## 2019-07-22 PROCEDURE — 770020 HCHG ROOM/CARE - TELE (206)

## 2019-07-22 PROCEDURE — 85025 COMPLETE CBC W/AUTO DIFF WBC: CPT

## 2019-07-22 PROCEDURE — 80307 DRUG TEST PRSMV CHEM ANLYZR: CPT

## 2019-07-22 PROCEDURE — A9270 NON-COVERED ITEM OR SERVICE: HCPCS | Performed by: INTERNAL MEDICINE

## 2019-07-22 PROCEDURE — 93306 TTE W/DOPPLER COMPLETE: CPT | Mod: 26 | Performed by: INTERNAL MEDICINE

## 2019-07-22 PROCEDURE — 700102 HCHG RX REV CODE 250 W/ 637 OVERRIDE(OP): Performed by: INTERNAL MEDICINE

## 2019-07-22 PROCEDURE — 700105 HCHG RX REV CODE 258: Performed by: INTERNAL MEDICINE

## 2019-07-22 PROCEDURE — 700102 HCHG RX REV CODE 250 W/ 637 OVERRIDE(OP): Performed by: HOSPITALIST

## 2019-07-22 PROCEDURE — 94640 AIRWAY INHALATION TREATMENT: CPT

## 2019-07-22 PROCEDURE — 94760 N-INVAS EAR/PLS OXIMETRY 1: CPT

## 2019-07-22 PROCEDURE — 700111 HCHG RX REV CODE 636 W/ 250 OVERRIDE (IP): Performed by: INTERNAL MEDICINE

## 2019-07-22 PROCEDURE — 80069 RENAL FUNCTION PANEL: CPT

## 2019-07-22 PROCEDURE — 93306 TTE W/DOPPLER COMPLETE: CPT

## 2019-07-22 PROCEDURE — 700101 HCHG RX REV CODE 250: Performed by: INTERNAL MEDICINE

## 2019-07-22 PROCEDURE — A9270 NON-COVERED ITEM OR SERVICE: HCPCS | Performed by: HOSPITALIST

## 2019-07-22 RX ORDER — LORAZEPAM 1 MG/1
1 TABLET ORAL EVERY 4 HOURS PRN
Status: DISCONTINUED | OUTPATIENT
Start: 2019-07-22 | End: 2019-07-23

## 2019-07-22 RX ORDER — LEVALBUTEROL INHALATION SOLUTION 1.25 MG/3ML
1.25 SOLUTION RESPIRATORY (INHALATION)
Status: DISCONTINUED | OUTPATIENT
Start: 2019-07-22 | End: 2019-07-22

## 2019-07-22 RX ORDER — LORAZEPAM 1 MG/1
4 TABLET ORAL
Status: DISCONTINUED | OUTPATIENT
Start: 2019-07-22 | End: 2019-07-23

## 2019-07-22 RX ORDER — LORAZEPAM 2 MG/ML
1 INJECTION INTRAMUSCULAR
Status: DISCONTINUED | OUTPATIENT
Start: 2019-07-22 | End: 2019-07-23

## 2019-07-22 RX ORDER — TIOTROPIUM BROMIDE 18 UG/1
1 CAPSULE ORAL; RESPIRATORY (INHALATION)
Status: DISCONTINUED | OUTPATIENT
Start: 2019-07-22 | End: 2019-07-25 | Stop reason: HOSPADM

## 2019-07-22 RX ORDER — LORAZEPAM 1 MG/1
2 TABLET ORAL
Status: DISCONTINUED | OUTPATIENT
Start: 2019-07-22 | End: 2019-07-23

## 2019-07-22 RX ORDER — LORAZEPAM 1 MG/1
3 TABLET ORAL
Status: DISCONTINUED | OUTPATIENT
Start: 2019-07-22 | End: 2019-07-23

## 2019-07-22 RX ORDER — LORAZEPAM 2 MG/ML
0.5 INJECTION INTRAMUSCULAR EVERY 4 HOURS PRN
Status: DISCONTINUED | OUTPATIENT
Start: 2019-07-22 | End: 2019-07-23

## 2019-07-22 RX ORDER — LEVALBUTEROL INHALATION SOLUTION 1.25 MG/3ML
1.25 SOLUTION RESPIRATORY (INHALATION) 4 TIMES DAILY
Status: DISCONTINUED | OUTPATIENT
Start: 2019-07-22 | End: 2019-07-22

## 2019-07-22 RX ORDER — LORAZEPAM 0.5 MG/1
0.5 TABLET ORAL EVERY 4 HOURS PRN
Status: DISCONTINUED | OUTPATIENT
Start: 2019-07-22 | End: 2019-07-23

## 2019-07-22 RX ORDER — LORAZEPAM 2 MG/ML
1.5 INJECTION INTRAMUSCULAR
Status: DISCONTINUED | OUTPATIENT
Start: 2019-07-22 | End: 2019-07-23

## 2019-07-22 RX ORDER — LEVALBUTEROL INHALATION SOLUTION 1.25 MG/3ML
1.25 SOLUTION RESPIRATORY (INHALATION)
Status: DISCONTINUED | OUTPATIENT
Start: 2019-07-22 | End: 2019-07-25 | Stop reason: HOSPADM

## 2019-07-22 RX ORDER — LORAZEPAM 2 MG/ML
2 INJECTION INTRAMUSCULAR
Status: DISCONTINUED | OUTPATIENT
Start: 2019-07-22 | End: 2019-07-23

## 2019-07-22 RX ORDER — BUDESONIDE AND FORMOTEROL FUMARATE DIHYDRATE 160; 4.5 UG/1; UG/1
2 AEROSOL RESPIRATORY (INHALATION)
Status: DISCONTINUED | OUTPATIENT
Start: 2019-07-22 | End: 2019-07-25 | Stop reason: HOSPADM

## 2019-07-22 RX ADMIN — OXYCODONE HYDROCHLORIDE 15 MG: 5 TABLET ORAL at 13:42

## 2019-07-22 RX ADMIN — GABAPENTIN 300 MG: 300 CAPSULE ORAL at 12:28

## 2019-07-22 RX ADMIN — LORAZEPAM 1 MG: 1 TABLET ORAL at 10:05

## 2019-07-22 RX ADMIN — GABAPENTIN 300 MG: 300 CAPSULE ORAL at 17:15

## 2019-07-22 RX ADMIN — LORAZEPAM 1 MG: 2 INJECTION INTRAMUSCULAR; INTRAVENOUS at 05:46

## 2019-07-22 RX ADMIN — GUAIFENESIN 1200 MG: 600 TABLET, EXTENDED RELEASE ORAL at 05:46

## 2019-07-22 RX ADMIN — IPRATROPIUM BROMIDE AND ALBUTEROL SULFATE 3 ML: .5; 3 SOLUTION RESPIRATORY (INHALATION) at 07:07

## 2019-07-22 RX ADMIN — NICOTINE 21 MG: 21 PATCH, EXTENDED RELEASE TRANSDERMAL at 05:46

## 2019-07-22 RX ADMIN — LEVALBUTEROL HYDROCHLORIDE 1.25 MG: 1.25 SOLUTION RESPIRATORY (INHALATION) at 12:05

## 2019-07-22 RX ADMIN — LEVALBUTEROL HYDROCHLORIDE 1.25 MG: 1.25 SOLUTION RESPIRATORY (INHALATION) at 19:17

## 2019-07-22 RX ADMIN — METHYLPREDNISOLONE SODIUM SUCCINATE 125 MG: 125 INJECTION, POWDER, FOR SOLUTION INTRAMUSCULAR; INTRAVENOUS at 20:34

## 2019-07-22 RX ADMIN — AMPICILLIN SODIUM AND SULBACTAM SODIUM 3 G: 2; 1 INJECTION, POWDER, FOR SOLUTION INTRAMUSCULAR; INTRAVENOUS at 05:46

## 2019-07-22 RX ADMIN — BUDESONIDE AND FORMOTEROL FUMARATE DIHYDRATE 2 PUFF: 160; 4.5 AEROSOL RESPIRATORY (INHALATION) at 19:26

## 2019-07-22 RX ADMIN — LEVALBUTEROL HYDROCHLORIDE 1.25 MG: 1.25 SOLUTION RESPIRATORY (INHALATION) at 15:52

## 2019-07-22 RX ADMIN — BUDESONIDE AND FORMOTEROL FUMARATE DIHYDRATE 2 PUFF: 160; 4.5 AEROSOL RESPIRATORY (INHALATION) at 12:03

## 2019-07-22 RX ADMIN — GUAIFENESIN 1200 MG: 600 TABLET, EXTENDED RELEASE ORAL at 17:15

## 2019-07-22 RX ADMIN — METHYLPREDNISOLONE SODIUM SUCCINATE 125 MG: 125 INJECTION, POWDER, FOR SOLUTION INTRAMUSCULAR; INTRAVENOUS at 05:46

## 2019-07-22 RX ADMIN — LORAZEPAM 1 MG: 1 TABLET ORAL at 21:46

## 2019-07-22 RX ADMIN — BENZONATATE 100 MG: 100 CAPSULE ORAL at 10:05

## 2019-07-22 RX ADMIN — DOXYCYCLINE 100 MG: 100 TABLET, FILM COATED ORAL at 17:15

## 2019-07-22 RX ADMIN — OXYCODONE HYDROCHLORIDE 15 MG: 5 TABLET ORAL at 09:17

## 2019-07-22 RX ADMIN — TIOTROPIUM BROMIDE 1 CAPSULE: 18 CAPSULE ORAL; RESPIRATORY (INHALATION) at 12:03

## 2019-07-22 RX ADMIN — BENZONATATE 100 MG: 100 CAPSULE ORAL at 17:23

## 2019-07-22 RX ADMIN — AMPICILLIN SODIUM AND SULBACTAM SODIUM 3 G: 2; 1 INJECTION, POWDER, FOR SOLUTION INTRAMUSCULAR; INTRAVENOUS at 12:29

## 2019-07-22 RX ADMIN — ENOXAPARIN SODIUM 40 MG: 100 INJECTION SUBCUTANEOUS at 06:09

## 2019-07-22 RX ADMIN — OXYCODONE HYDROCHLORIDE 15 MG: 5 TABLET ORAL at 17:15

## 2019-07-22 RX ADMIN — OXYCODONE HYDROCHLORIDE 15 MG: 5 TABLET ORAL at 20:34

## 2019-07-22 RX ADMIN — AMPICILLIN SODIUM AND SULBACTAM SODIUM 3 G: 2; 1 INJECTION, POWDER, FOR SOLUTION INTRAMUSCULAR; INTRAVENOUS at 17:17

## 2019-07-22 RX ADMIN — METHYLPREDNISOLONE SODIUM SUCCINATE 125 MG: 125 INJECTION, POWDER, FOR SOLUTION INTRAMUSCULAR; INTRAVENOUS at 12:29

## 2019-07-22 RX ADMIN — GABAPENTIN 300 MG: 300 CAPSULE ORAL at 05:45

## 2019-07-22 RX ADMIN — LORAZEPAM 1 MG: 1 TABLET ORAL at 16:08

## 2019-07-22 RX ADMIN — DOXYCYCLINE 100 MG: 100 TABLET, FILM COATED ORAL at 05:46

## 2019-07-22 ASSESSMENT — LIFESTYLE VARIABLES
TOTAL SCORE: 1
HEADACHE, FULLNESS IN HEAD: MILD
NAUSEA AND VOMITING: NO NAUSEA AND NO VOMITING
HOW MANY TIMES IN THE PAST YEAR HAVE YOU HAD 5 OR MORE DRINKS IN A DAY: 7
TOTAL SCORE: 1
TREMOR: MODERATE TREMOR WITH ARMS EXTENDED
AGITATION: SOMEWHAT MORE THAN NORMAL ACTIVITY
TOTAL SCORE: 1
DO YOU DRINK ALCOHOL: YES
ORIENTATION AND CLOUDING OF SENSORIUM: ORIENTED AND CAN DO SERIAL ADDITIONS
AVERAGE NUMBER OF DAYS PER WEEK YOU HAVE A DRINK CONTAINING ALCOHOL: 0
EVER HAD A DRINK FIRST THING IN THE MORNING TO STEADY YOUR NERVES TO GET RID OF A HANGOVER: NO
EVER FELT BAD OR GUILTY ABOUT YOUR DRINKING: YES
HAVE YOU EVER FELT YOU SHOULD CUT DOWN ON YOUR DRINKING: NO
ON A TYPICAL DAY WHEN YOU DRINK ALCOHOL HOW MANY DRINKS DO YOU HAVE: 2
AUDITORY DISTURBANCES: NOT PRESENT
HAVE PEOPLE ANNOYED YOU BY CRITICIZING YOUR DRINKING: NO
CONSUMPTION TOTAL: POSITIVE
ANXIETY: *
PAROXYSMAL SWEATS: NO SWEAT VISIBLE
VISUAL DISTURBANCES: VERY MILD SENSITIVITY
TOTAL SCORE: 10

## 2019-07-22 ASSESSMENT — ENCOUNTER SYMPTOMS
FEVER: 0
VOMITING: 0
FOCAL WEAKNESS: 0
NERVOUS/ANXIOUS: 1
DIARRHEA: 0
DEPRESSION: 0
WHEEZING: 1
SORE THROAT: 0
DIZZINESS: 0
BLOOD IN STOOL: 0
COUGH: 1
PALPITATIONS: 0
NAUSEA: 0
SHORTNESS OF BREATH: 1
BACK PAIN: 0
HEADACHES: 0
CHILLS: 0
WEAKNESS: 1
MYALGIAS: 0
HEARTBURN: 0
HALLUCINATIONS: 0
SPUTUM PRODUCTION: 0
ABDOMINAL PAIN: 0

## 2019-07-22 NOTE — CARE PLAN
Problem: Infection  Goal: Will remain free from infection  Outcome: PROGRESSING AS EXPECTED  Pt educated on hand and oral hygiene, standard precautions followed.     Problem: Venous Thromboembolism (VTW)/Deep Vein Thrombosis (DVT) Prevention:  Goal: Patient will participate in Venous Thrombosis (VTE)/Deep Vein Thrombosis (DVT)Prevention Measures  Outcome: PROGRESSING AS EXPECTED  Pt ambulates and has been receiving Lovenox for DVT prevention.

## 2019-07-22 NOTE — PROGRESS NOTES
Telemetry Strip     Strip printed: 0656  Measurements from am strip were as follows:  Rhythm:sr  HR: 95  Measurements: .14/.08/.40       Telemetry Shift Summary:    Rhythm: sr  HR: 90-110s  Ectopy: opac, pvc           Normal Values  Rhythm SR  HR Range    Measurements 0.12-0.20 / 0.06-0.10  / 0.30-0.52

## 2019-07-22 NOTE — CARE PLAN
Problem: Bronchoconstriction:  Goal: Improve in air movement and diminished wheezing  Respiratory Therapy Update    Interdisciplinary Plan of Care-Goals (Indications)  Bronchodilator Indications: History / Diagnosis;Physical Exam / Hyperinflation / Wheezing (bronchospasm);Strong Subjective / Objective Improvement (07/22/19 1550)  Interdisciplinary Plan of Care-Outcomes   Bronchodilator Outcome: Diminished Wheezing and Volume of Air Movement Increased;Patient at Stable Baseline (07/22/19 1550)          #SVN Performed: Yes (07/22/19 1550)    Cough: Congested;Tight (07/22/19 1550)  Sputum Amount: Unable to Evaluate (07/22/19 1550)  Sputum Color: Unable to Evaluate (07/22/19 1550)  Sputum Consistency: Unable to Evaluate (07/22/19 0704)                  O2 (LPM): 2 (07/22/19 1606)  O2 Daily Delivery Respiratory : Silicone Nasal Cannula (07/22/19 1606)    Breath Sounds  Pre/Post Intervention: Pre Intervention Assessment (07/22/19 1550)  RUL Breath Sounds: Expiratory Wheezes;Fine Crackles (07/22/19 1550)  RML Breath Sounds: Expiratory Wheezes (07/22/19 1550)  RLL Breath Sounds: Diminished (07/22/19 1550)  RODY Breath Sounds: Expiratory Wheezes;Fine Crackles (07/22/19 1550)  LLL Breath Sounds: Diminished (07/22/19 1550)        Events/Summary/Plan: SVN done in fowlers via m/p.  Pt. found on RA.  SpO2 = 87%.  Pt. back on 2L O2. (07/22/19 1550)

## 2019-07-22 NOTE — PROGRESS NOTES
[facility] Medicine Daily Progress Note    Date of Service  7/22/2019    Chief Complaint  64 y.o. female admitted 7/20/2019 with SOB    Hospital Course    History of tobacco, alcohol use, anxiety admitted for shortness of breath found to have COPD exacerbation and likely pneumonia      Interval Problem Update  7/21: Tolerating Solu-Medrol but it makes her anxious, treating with Ativan.  Tolerating Unasyn and doxy.  Still wheezy, on 3 L  7/22: Remains on 2 to 3 L, very wheezy, anxious.  Nebulizer changed to Xopenex, Symbicort and Spiriva added.  Echo normal    Consultants/Specialty  None    Code Status  DNR/DNI    Disposition  Wean oxygen  Need outpatient PFTs in 6 to 8 weeks  Needs maintenance inhaler on discharge    Review of Systems  Review of Systems   Constitutional: Positive for malaise/fatigue. Negative for chills and fever.        Feels no better than yesterday   HENT: Negative for sore throat.    Respiratory: Positive for cough, shortness of breath and wheezing. Negative for sputum production.    Cardiovascular: Negative for chest pain and palpitations.   Gastrointestinal: Negative for abdominal pain, blood in stool, diarrhea, heartburn, nausea and vomiting.   Genitourinary: Negative for dysuria and frequency.   Musculoskeletal: Negative for back pain and myalgias.   Neurological: Positive for weakness. Negative for dizziness, focal weakness and headaches.   Psychiatric/Behavioral: Negative for depression and hallucinations. The patient is nervous/anxious.    All other systems reviewed and are negative.       Physical Exam  Temp:  [36.3 °C (97.3 °F)-36.9 °C (98.4 °F)] 36.8 °C (98.2 °F)  Pulse:  [] 109  Resp:  [16-20] 18  BP: (137-170)/(81-95) 152/89  SpO2:  [87 %-95 %] 92 %    Physical Exam   Constitutional: She is oriented to person, place, and time. She appears well-developed and well-nourished. She appears distressed.   HENT:   Head: Normocephalic.   Mouth/Throat: No oropharyngeal exudate.   Eyes:  Pupils are equal, round, and reactive to light. No scleral icterus.   Neck: Normal range of motion. Neck supple. No JVD present. No tracheal deviation present.   Cardiovascular: Normal rate and regular rhythm.    No murmur heard.  Pulmonary/Chest: Effort normal. She has wheezes. She has rales.   Abdominal: Soft. Bowel sounds are normal. There is no rebound and no guarding.   Musculoskeletal: She exhibits no edema or tenderness.   Neurological: She is alert and oriented to person, place, and time. She displays tremor. No cranial nerve deficit. Coordination normal.   Skin: Skin is warm and dry. No rash noted. No erythema.   Psychiatric:   Anxious       Fluids    Intake/Output Summary (Last 24 hours) at 07/22/19 1624  Last data filed at 07/22/19 0920   Gross per 24 hour   Intake              180 ml   Output                0 ml   Net              180 ml       Laboratory  Recent Labs      07/20/19 2005 07/21/19 0426 07/22/19   0420   WBC  5.8  7.6  10.6   RBC  3.97*  3.68*  3.52*   HEMOGLOBIN  12.0  11.0*  10.8*   HEMATOCRIT  36.8*  34.1*  33.7*   MCV  92.7  92.7  95.7   MCH  30.2  29.9  30.7   MCHC  32.6*  32.3*  32.0*   RDW  53.5*  53.4*  56.8*   PLATELETCT  241  217  196   MPV  9.1  9.2  9.9     Recent Labs      07/20/19 2005 07/21/19   0426 07/22/19   0420   SODIUM  146*  141  139   POTASSIUM  3.3*  3.7  3.9   CHLORIDE  107  113*  109   CO2  23  20  21   GLUCOSE  109*  144*  154*   BUN  12  13  12   CREATININE  0.77  0.79  0.71   CALCIUM  8.7  7.9*  7.8*     Recent Labs      07/20/19 2005   INR  0.89               Imaging  EC-ECHOCARDIOGRAM COMPLETE W/O CONT   Final Result      US-EXTREMITY VENOUS LOWER BILAT   Final Result      No evidence of Bilateral  lower extremity deep venous thrombosis.            INTERPRETING LOCATION: [address] [place] [address], [id number]      DX-CHEST-PORTABLE (1 VIEW)   Final Result      No acute cardiopulmonary abnormality.           Assessment/Plan  * Acute exacerbation of chronic  obstructive pulmonary disease (COPD) ([facility])   Assessment & Plan    Still with significant wheezing  Continue IV Solu-Medrol, RT protocol, mucolytics  Continue Unasyn/Doxy (due to fever on admission)  She has never had pulmonary function testing, I recommended this be done as an outpatient in 6 to 8 weeks  Start Symbicort and Spiriva  Change nebs to xopenex to reduce anxiety potential  Mucinex     Hypernatremia   Assessment & Plan    IVF     Hypokalemia   Assessment & Plan    Repeat in AM     Alcohol abuse   Assessment & Plan    Patient previously was abstinent but relapsed during her trip from [place]  She will not quantify her current alcohol use  Watch for etoh withdrawal     Anxiety   Assessment & Plan    Patient is very fidgety presumably from steroids  Ativan as needed  Watch for etoh withdrawal (heavy drinking history)     Acute respiratory failure with hypoxemia ([facility])   Assessment & Plan    Due to COPD exacerbation cannot rule out pneumonia  Continue Unasyn and doxy     Tobacco abuse- (present on admission)   Assessment & Plan    Counseled  Patch and gum          VTE prophylaxis: Lovenox

## 2019-07-22 NOTE — PROGRESS NOTES
Report received from MANN Glover. Patient resting comfortably in bed. Respiratory therapy giving treatment. Declines any needs at this time. Call light in reach. Bed alarm on.

## 2019-07-22 NOTE — PROGRESS NOTES
BP reassessed after administration of Ativan.  MD paged for elevated BP, 170/95.  10mg of hydralazine ordered by MD and administered by this RN.  Pt will be reassessed in 1 hr.

## 2019-07-22 NOTE — CARE PLAN
Problem: Bronchoconstriction:  Goal: Improve in air movement and diminished wheezing    Intervention: Evaluate and manage medication effects  Respiratory Therapy Update    Interdisciplinary Plan of Care-Goals (Indications)  Bronchodilator Indications: History / Diagnosis;Physical Exam / Hyperinflation / Wheezing (bronchospasm);Strong Subjective / Objective Improvement (07/22/19 1204)  Interdisciplinary Plan of Care-Outcomes   Bronchodilator Outcome: Diminished Wheezing and Volume of Air Movement Increased;Patient at Stable Baseline (07/22/19 1204)          #SVN Performed: Yes (07/22/19 1204)    Cough: Congested;Non Productive;Strong (07/22/19 1204)  Sputum Amount: Unable to Evaluate (07/22/19 1204)  Sputum Color: Unable to Evaluate (07/22/19 1204)  Sputum Consistency: Unable to Evaluate (07/22/19 0704)                  O2 (LPM): 2 (07/22/19 1204)  O2 Daily Delivery Respiratory : Silicone Nasal Cannula (07/22/19 1204)    Breath Sounds  Pre/Post Intervention: Pre Intervention Assessment (07/22/19 1204)  RUL Breath Sounds: Crackles;Expiratory Wheezes (07/22/19 1204)  RML Breath Sounds: Crackles;Expiratory Wheezes (07/22/19 1204)  RLL Breath Sounds: Diminished;Expiratory Wheezes (07/22/19 1204)  RODY Breath Sounds: Crackles;Expiratory Wheezes (07/22/19 1204)  LLL Breath Sounds: Diminished;Expiratory Wheezes (07/22/19 1204)        Events/Summary/Plan: PRN svn with Xopenex requested by pt. f/b MDIs.  Pt. alert and cooperative. (07/22/19 1204)

## 2019-07-22 NOTE — PROGRESS NOTES
Report received from MANN Mendez. Plan of care discussed. Patient resting comfortably in bed, declines any further needs at this time. Safety precautions in place.

## 2019-07-22 NOTE — CARE PLAN
Problem: Bronchoconstriction:  Goal: Improve in air movement and diminished wheezing    Intervention: Evaluate and manage medication effects  Respiratory Therapy Update    Interdisciplinary Plan of Care-Goals (Indications)  Bronchodilator Indications: History / Diagnosis;Physical Exam / Hyperinflation / Wheezing (bronchospasm);Strong Subjective / Objective Improvement (07/22/19 0704)  Interdisciplinary Plan of Care-Outcomes   Bronchodilator Outcome: Diminished Wheezing and Volume of Air Movement Increased;Patient at Stable Baseline (07/22/19 0704)          #SVN Performed: Yes (07/22/19 0704)    Cough: Congested;Non Productive;Tight (07/22/19 0704)  Sputum Amount: Unable to Evaluate (07/22/19 0704)  Sputum Color: Unable to Evaluate (07/22/19 0704)  Sputum Consistency: Unable to Evaluate (07/22/19 0704)                  O2 (LPM): 0 (07/22/19 0704)  O2 Daily Delivery Respiratory : Room Air with O2 Available (07/22/19 0704)    Breath Sounds  Pre/Post Intervention: Pre Intervention Assessment (07/22/19 0704)  RUL Breath Sounds: Crackles;Diminished;Expiratory Wheezes (07/22/19 0704)  RML Breath Sounds: Diminished;Expiratory Wheezes (07/22/19 0704)  RLL Breath Sounds: Diminished (07/22/19 0704)  RODY Breath Sounds: Crackles;Diminished;Expiratory Wheezes (07/22/19 0704)  LLL Breath Sounds: Diminished (07/22/19 0704)      Events/Summary/Plan: SVN done via m/p in fowlers w/o distress at this time. (07/22/19 0704)

## 2019-07-22 NOTE — CARE PLAN
"Problem: Safety  Goal: Will remain free from falls  Outcome: PROGRESSING AS EXPECTED  Reinforced fall risk precautions. Bed alarm on, Non-skid socks on feet, call light in reach. Stand by assist/Independent to the bathroom.      Problem: Infection  Goal: Will remain free from infection  Outcome: PROGRESSING AS EXPECTED  Good hand and oral hygiene promoted. Afebrile, WBCs WNL. Standard precaution in place. Perform hand washing. Administer ABX as prescribed.    Problem: Knowledge Deficit  Goal: Knowledge of disease process/condition, treatment plan, diagnostic tests, and medications will improve  Outcome: PROGRESSING AS EXPECTED  Discuss POC with Pt. Encourage patient to ask questions and be involved in plan of care. Assess Pt's knowledge of disease process, treatment plan, diagnostic test, labs, and medications; educate, and give information as needed.     Problem: Psychosocial Needs:  Goal: Level of anxiety will decrease  Outcome: PROGRESSING AS EXPECTED  Patient reports feeling anxious and \"jittery\" throughout the day. Patient feels this is due to side effect of steroid. Ativan given according to ordered PRN schedule.       "

## 2019-07-22 NOTE — FLOWSHEET NOTE
07/21/19 2017   Events/Summary/Plan   Non-Invasive Resp Device Site Inspection Completed Intact   Interdisciplinary Plan of Care-Goals (Indications)   Bronchodilator Indications History / Diagnosis   Interdisciplinary Plan of Care-Outcomes    Bronchodilator Outcome Diminished Wheezing and Volume of Air Movement Increased   Education   Education Yes - Pt. / Family has been Instructed in use of Respiratory Equipment;Yes - Pt. / Family has been Instructed in use of Respiratory Medications and Adverse Reactions   RT Assessment of Delivered Medications   Evaluation of Medication Delivery Daily Yes-- Pt /Family has been Instructed in use of Respiratory Medications and Adverse Reactions   SVN Group   #SVN Performed Yes   Given By: Mouthpiece   Respiratory WDL   Respiratory (WDL) X   Chest Exam   Work Of Breathing / Effort Mild   Respiration 16   Pulse 91   Breath Sounds   Pre/Post Intervention Pre Intervention Assessment   RUL Breath Sounds Expiratory Wheezes   RML Breath Sounds Diminished   RLL Breath Sounds Diminished   RODY Breath Sounds Expiratory Wheezes   LLL Breath Sounds Diminished   Oximetry   #Pulse Oximetry (Single Determination) Yes   Oxygen   Pulse Oximetry 92 %   O2 (LPM) 0   O2 Daily Delivery Respiratory  Room Air with O2 Available

## 2019-07-22 NOTE — PROGRESS NOTES
Patient resting in bed. No complaints at the moment. Bed in low locked position, call bell within reach. Continue to monitor.

## 2019-07-22 NOTE — PROGRESS NOTES
Pt assessed and plan of care discussed.  Pt is hypertensive and anxious with hand tremors.  PRN ativan will be administered when available.  Pt has expiratory wheezes t/o all lobes.  Scheduled pain medication administered for back and lower L rib pain. All needs met at this time.

## 2019-07-22 NOTE — PROGRESS NOTES
Telemetry Shift Summary    Rhythm SR/ST  HR Range 90s-115  Ectopy rare to occasional PVCs  Measurements 0.16/0.08/0.16        Normal Values  Rhythm SR  HR Range    Measurements 0.12-0.20 / 0.06-0.10  / 0.30-0.52

## 2019-07-23 LAB
ALBUMIN SERPL BCP-MCNC: 3.2 G/DL (ref 3.2–4.9)
AMPHET UR QL SCN: NEGATIVE
BARBITURATES UR QL SCN: NEGATIVE
BASOPHILS # BLD AUTO: 0.1 % (ref 0–1.8)
BASOPHILS # BLD: 0.01 K/UL (ref 0–0.12)
BENZODIAZ UR QL SCN: NEGATIVE
BUN SERPL-MCNC: 17 MG/DL (ref 8–22)
BZE UR QL SCN: NEGATIVE
CALCIUM SERPL-MCNC: 7.7 MG/DL (ref 8.4–10.2)
CANNABINOIDS UR QL SCN: NEGATIVE
CHLORIDE SERPL-SCNC: 104 MMOL/L (ref 96–112)
CO2 SERPL-SCNC: 24 MMOL/L (ref 20–33)
CREAT SERPL-MCNC: 0.69 MG/DL (ref 0.5–1.4)
EOSINOPHIL # BLD AUTO: 0 K/UL (ref 0–0.51)
EOSINOPHIL NFR BLD: 0 % (ref 0–6.9)
ERYTHROCYTE [DISTWIDTH] IN BLOOD BY AUTOMATED COUNT: 57.9 FL (ref 35.9–50)
GLUCOSE SERPL-MCNC: 176 MG/DL (ref 65–99)
HCT VFR BLD AUTO: 31.3 % (ref 37–47)
HGB BLD-MCNC: 9.9 G/DL (ref 12–16)
IMM GRANULOCYTES # BLD AUTO: 0.24 K/UL (ref 0–0.11)
IMM GRANULOCYTES NFR BLD AUTO: 2.5 % (ref 0–0.9)
LYMPHOCYTES # BLD AUTO: 0.18 K/UL (ref 1–4.8)
LYMPHOCYTES NFR BLD: 1.9 % (ref 22–41)
MCH RBC QN AUTO: 30.1 PG (ref 27–33)
MCHC RBC AUTO-ENTMCNC: 31.6 G/DL (ref 33.6–35)
MCV RBC AUTO: 95.1 FL (ref 81.4–97.8)
METHADONE UR QL SCN: NEGATIVE
MONOCYTES # BLD AUTO: 0.18 K/UL (ref 0–0.85)
MONOCYTES NFR BLD AUTO: 1.9 % (ref 0–13.4)
NEUTROPHILS # BLD AUTO: 8.93 K/UL (ref 2–7.15)
NEUTROPHILS NFR BLD: 93.6 % (ref 44–72)
NRBC # BLD AUTO: 0 K/UL
NRBC BLD-RTO: 0 /100 WBC
OPIATES UR QL SCN: POSITIVE
OXYCODONE UR QL SCN: POSITIVE
PCP UR QL SCN: NEGATIVE
PHOSPHATE SERPL-MCNC: 3.1 MG/DL (ref 2.5–4.5)
PLATELET # BLD AUTO: 208 K/UL (ref 164–446)
PMV BLD AUTO: 9.8 FL (ref 9–12.9)
POTASSIUM SERPL-SCNC: 3.3 MMOL/L (ref 3.6–5.5)
PROPOXYPH UR QL SCN: NEGATIVE
RBC # BLD AUTO: 3.29 M/UL (ref 4.2–5.4)
SODIUM SERPL-SCNC: 139 MMOL/L (ref 135–145)
WBC # BLD AUTO: 9.5 K/UL (ref 4.8–10.8)

## 2019-07-23 PROCEDURE — 85025 COMPLETE CBC W/AUTO DIFF WBC: CPT

## 2019-07-23 PROCEDURE — 700101 HCHG RX REV CODE 250: Performed by: INTERNAL MEDICINE

## 2019-07-23 PROCEDURE — 700102 HCHG RX REV CODE 250 W/ 637 OVERRIDE(OP): Performed by: INTERNAL MEDICINE

## 2019-07-23 PROCEDURE — A9270 NON-COVERED ITEM OR SERVICE: HCPCS | Performed by: HOSPITALIST

## 2019-07-23 PROCEDURE — 94760 N-INVAS EAR/PLS OXIMETRY 1: CPT

## 2019-07-23 PROCEDURE — A9270 NON-COVERED ITEM OR SERVICE: HCPCS | Performed by: INTERNAL MEDICINE

## 2019-07-23 PROCEDURE — 770006 HCHG ROOM/CARE - MED/SURG/GYN SEMI*

## 2019-07-23 PROCEDURE — 80069 RENAL FUNCTION PANEL: CPT

## 2019-07-23 PROCEDURE — 700111 HCHG RX REV CODE 636 W/ 250 OVERRIDE (IP): Performed by: INTERNAL MEDICINE

## 2019-07-23 PROCEDURE — 700102 HCHG RX REV CODE 250 W/ 637 OVERRIDE(OP): Performed by: HOSPITALIST

## 2019-07-23 PROCEDURE — 94640 AIRWAY INHALATION TREATMENT: CPT

## 2019-07-23 PROCEDURE — 99233 SBSQ HOSP IP/OBS HIGH 50: CPT | Performed by: INTERNAL MEDICINE

## 2019-07-23 PROCEDURE — 700105 HCHG RX REV CODE 258: Performed by: INTERNAL MEDICINE

## 2019-07-23 RX ORDER — LIDOCAINE 50 MG/G
1 PATCH TOPICAL EVERY 24 HOURS
Status: DISCONTINUED | OUTPATIENT
Start: 2019-07-23 | End: 2019-07-25 | Stop reason: HOSPADM

## 2019-07-23 RX ORDER — FAMOTIDINE 20 MG/1
20 TABLET, FILM COATED ORAL 2 TIMES DAILY
Status: DISCONTINUED | OUTPATIENT
Start: 2019-07-23 | End: 2019-07-25 | Stop reason: HOSPADM

## 2019-07-23 RX ORDER — POTASSIUM CHLORIDE 20 MEQ/1
40 TABLET, EXTENDED RELEASE ORAL DAILY
Status: DISCONTINUED | OUTPATIENT
Start: 2019-07-24 | End: 2019-07-25 | Stop reason: HOSPADM

## 2019-07-23 RX ORDER — PREDNISONE 10 MG/1
40 TABLET ORAL DAILY
Status: DISCONTINUED | OUTPATIENT
Start: 2019-07-24 | End: 2019-07-25 | Stop reason: HOSPADM

## 2019-07-23 RX ADMIN — LORAZEPAM 1 MG: 1 TABLET ORAL at 09:09

## 2019-07-23 RX ADMIN — DOXYCYCLINE 100 MG: 100 TABLET, FILM COATED ORAL at 18:02

## 2019-07-23 RX ADMIN — LORAZEPAM 1 MG: 1 TABLET ORAL at 04:45

## 2019-07-23 RX ADMIN — GUAIFENESIN 1200 MG: 600 TABLET, EXTENDED RELEASE ORAL at 05:31

## 2019-07-23 RX ADMIN — BUDESONIDE AND FORMOTEROL FUMARATE DIHYDRATE 2 PUFF: 160; 4.5 AEROSOL RESPIRATORY (INHALATION) at 19:22

## 2019-07-23 RX ADMIN — DOXYCYCLINE 100 MG: 100 TABLET, FILM COATED ORAL at 05:31

## 2019-07-23 RX ADMIN — LEVALBUTEROL HYDROCHLORIDE 1.25 MG: 1.25 SOLUTION RESPIRATORY (INHALATION) at 06:23

## 2019-07-23 RX ADMIN — AMPICILLIN SODIUM AND SULBACTAM SODIUM 3 G: 2; 1 INJECTION, POWDER, FOR SOLUTION INTRAMUSCULAR; INTRAVENOUS at 11:57

## 2019-07-23 RX ADMIN — GABAPENTIN 300 MG: 300 CAPSULE ORAL at 11:56

## 2019-07-23 RX ADMIN — GABAPENTIN 300 MG: 300 CAPSULE ORAL at 18:02

## 2019-07-23 RX ADMIN — GUAIFENESIN 1200 MG: 600 TABLET, EXTENDED RELEASE ORAL at 18:02

## 2019-07-23 RX ADMIN — LEVALBUTEROL HYDROCHLORIDE 1.25 MG: 1.25 SOLUTION RESPIRATORY (INHALATION) at 19:21

## 2019-07-23 RX ADMIN — OXYCODONE HYDROCHLORIDE 15 MG: 5 TABLET ORAL at 23:02

## 2019-07-23 RX ADMIN — LEVALBUTEROL HYDROCHLORIDE 1.25 MG: 1.25 SOLUTION RESPIRATORY (INHALATION) at 10:09

## 2019-07-23 RX ADMIN — LORAZEPAM 1 MG: 1 TABLET ORAL at 00:15

## 2019-07-23 RX ADMIN — LIDOCAINE 1 PATCH: 50 PATCH TOPICAL at 16:46

## 2019-07-23 RX ADMIN — OXYCODONE HYDROCHLORIDE 15 MG: 5 TABLET ORAL at 16:45

## 2019-07-23 RX ADMIN — METHYLPREDNISOLONE SODIUM SUCCINATE 125 MG: 125 INJECTION, POWDER, FOR SOLUTION INTRAMUSCULAR; INTRAVENOUS at 05:31

## 2019-07-23 RX ADMIN — BENZONATATE 100 MG: 100 CAPSULE ORAL at 09:11

## 2019-07-23 RX ADMIN — ACETAMINOPHEN 650 MG: 325 TABLET, FILM COATED ORAL at 00:24

## 2019-07-23 RX ADMIN — METHYLPREDNISOLONE SODIUM SUCCINATE 125 MG: 125 INJECTION, POWDER, FOR SOLUTION INTRAMUSCULAR; INTRAVENOUS at 18:03

## 2019-07-23 RX ADMIN — OXYCODONE HYDROCHLORIDE 15 MG: 5 TABLET ORAL at 09:04

## 2019-07-23 RX ADMIN — LEVALBUTEROL HYDROCHLORIDE 1.25 MG: 1.25 SOLUTION RESPIRATORY (INHALATION) at 14:28

## 2019-07-23 RX ADMIN — FAMOTIDINE 20 MG: 20 TABLET, FILM COATED ORAL at 19:12

## 2019-07-23 RX ADMIN — AMPICILLIN SODIUM AND SULBACTAM SODIUM 3 G: 2; 1 INJECTION, POWDER, FOR SOLUTION INTRAMUSCULAR; INTRAVENOUS at 00:16

## 2019-07-23 RX ADMIN — BUDESONIDE AND FORMOTEROL FUMARATE DIHYDRATE 2 PUFF: 160; 4.5 AEROSOL RESPIRATORY (INHALATION) at 06:24

## 2019-07-23 RX ADMIN — METHYLPREDNISOLONE SODIUM SUCCINATE 125 MG: 125 INJECTION, POWDER, FOR SOLUTION INTRAMUSCULAR; INTRAVENOUS at 00:16

## 2019-07-23 RX ADMIN — TIOTROPIUM BROMIDE 1 CAPSULE: 18 CAPSULE ORAL; RESPIRATORY (INHALATION) at 06:23

## 2019-07-23 RX ADMIN — AMPICILLIN SODIUM AND SULBACTAM SODIUM 3 G: 2; 1 INJECTION, POWDER, FOR SOLUTION INTRAMUSCULAR; INTRAVENOUS at 05:32

## 2019-07-23 RX ADMIN — LIDOCAINE 1 PATCH: 50 PATCH TOPICAL at 16:44

## 2019-07-23 RX ADMIN — METHYLPREDNISOLONE SODIUM SUCCINATE 125 MG: 125 INJECTION, POWDER, FOR SOLUTION INTRAMUSCULAR; INTRAVENOUS at 11:57

## 2019-07-23 RX ADMIN — ENOXAPARIN SODIUM 40 MG: 100 INJECTION SUBCUTANEOUS at 05:31

## 2019-07-23 RX ADMIN — GABAPENTIN 300 MG: 300 CAPSULE ORAL at 05:31

## 2019-07-23 RX ADMIN — NICOTINE 21 MG: 21 PATCH, EXTENDED RELEASE TRANSDERMAL at 05:31

## 2019-07-23 ASSESSMENT — LIFESTYLE VARIABLES
AGITATION: NORMAL ACTIVITY
VISUAL DISTURBANCES: VERY MILD SENSITIVITY
TREMOR: *
HEADACHE, FULLNESS IN HEAD: MODERATE
HEADACHE, FULLNESS IN HEAD: MILD
NAUSEA AND VOMITING: NO NAUSEA AND NO VOMITING
NAUSEA AND VOMITING: NO NAUSEA AND NO VOMITING
ANXIETY: *
PAROXYSMAL SWEATS: BARELY PERCEPTIBLE SWEATING. PALMS MOIST
ORIENTATION AND CLOUDING OF SENSORIUM: ORIENTED AND CAN DO SERIAL ADDITIONS
TOTAL SCORE: 8
TREMOR: *
AUDITORY DISTURBANCES: NOT PRESENT
VISUAL DISTURBANCES: VERY MILD SENSITIVITY
TOTAL SCORE: 10
ORIENTATION AND CLOUDING OF SENSORIUM: ORIENTED AND CAN DO SERIAL ADDITIONS
VISUAL DISTURBANCES: NOT PRESENT
NAUSEA AND VOMITING: MILD NAUSEA WITH NO VOMITING
ANXIETY: *
ORIENTATION AND CLOUDING OF SENSORIUM: ORIENTED AND CAN DO SERIAL ADDITIONS
ANXIETY: *
AUDITORY DISTURBANCES: NOT PRESENT
AUDITORY DISTURBANCES: NOT PRESENT
TREMOR: *
PAROXYSMAL SWEATS: NO SWEAT VISIBLE
AGITATION: SOMEWHAT MORE THAN NORMAL ACTIVITY
TOTAL SCORE: 9
PAROXYSMAL SWEATS: NO SWEAT VISIBLE
AGITATION: SOMEWHAT MORE THAN NORMAL ACTIVITY
HEADACHE, FULLNESS IN HEAD: MODERATE

## 2019-07-23 ASSESSMENT — ENCOUNTER SYMPTOMS
SPUTUM PRODUCTION: 1
HEADACHES: 1
HEARTBURN: 0
SORE THROAT: 0
BLOOD IN STOOL: 0
WHEEZING: 1
NERVOUS/ANXIOUS: 1
DIZZINESS: 0
DIARRHEA: 0
WEAKNESS: 1
COUGH: 1
TREMORS: 1
BACK PAIN: 1
NECK PAIN: 1
DIAPHORESIS: 0
TINGLING: 0
NAUSEA: 0
SHORTNESS OF BREATH: 1
FOCAL WEAKNESS: 0
DEPRESSION: 0
ABDOMINAL PAIN: 0
FEVER: 0

## 2019-07-23 NOTE — PROGRESS NOTES
Pt assessed and plan of care discussed.  Pt anxious with visible tremors.  CIWA assessment performed and pt medicated according to MAR.  Pt up to bathroom with handheld assist, urine sample collected.  All pt needs met at this time.

## 2019-07-23 NOTE — DISCHARGE PLANNING
No SW need identified at this time. CM team will follow for d/c planning needs.     Care Transition Team Assessment    Information Source  Orientation : Oriented x 4  Information Given By: Patient  Who is responsible for making decisions for patient? : Patient    Readmission Evaluation  Is this a readmission?: No    Elopement Risk  Legal Hold: No  Ambulatory or Self Mobile in Wheelchair: Yes  Disoriented: No  Psychiatric Symptoms: None  History of Wandering: No  Elopement this Admit: No  Vocalizing Wanting to Leave: No  Displays Behaviors, Body Language Wanting to Leave: No-Not at Risk for Elopement  Elopement Risk: Not at Risk for Elopement    Interdisciplinary Discharge Planning  Does Admitting Nurse Feel This Could be a Complex Discharge?: No  Primary Care Physician: David Matta MD [facility]  Lives with - Patient's Self Care Capacity: Spouse  Patient or legal guardian wants to designate a caregiver (see row info): No  Support Systems: Family Member(s)  Housing / Facility: [address]  Do You Take your Prescribed Medications Regularly: Yes  Able to Return to Previous ADL's: Yes  Mobility Issues: No  Prior Services: None  Assistance Needed: No  Durable Medical Equipment: Not Applicable    Discharge Preparedness  What is your plan after discharge?: Home with help  What are your discharge supports?: Spouse  Prior Functional Level: Independent with Activities of Daily Living    Functional Assesment  Prior Functional Level: Independent with Activities of Daily Living    Finances  Financial Barriers to Discharge: No  Prescription Coverage: No  Prescription Coverage Comments:  (non listed on file)    Vision / Hearing Impairment  Vision Impairment : No  Right Eye Vision: Impaired (impaired peripheral vision)  Left Eye Vision: Impaired (impaired peripheral vision)  Hearing Impairment : No         Advance Directive  Advance Directive?: None    Domestic Abuse  Have you ever been the victim of abuse or  violence?: No  Physical Abuse or Sexual Abuse: No  Verbal Abuse or Emotional Abuse: No  Possible Abuse Reported to:: Not Applicable    Psychological Assessment  History of Substance Abuse: Alcohol  History of Psychiatric Problems: No  Non-compliant with Treatment: Yes    Discharge Risks or Barriers  Discharge risks or barriers?: Substance abuse, Complex medical needs  Patient risk factors: Complex medical needs, Substance abuse, Vulnerable adult    Anticipated Discharge Information  Anticipated discharge disposition: Home  Discharge Address:  ([address] [place] [address]  [facility] [id number])

## 2019-07-23 NOTE — CARE PLAN
Problem: Safety  Goal: Will remain free from falls  Outcome: PROGRESSING AS EXPECTED  Reinforced fall risk precautions. Bed alarm on, Non-skid socks on feet, call light in reach. 1 person assist to the bathroom.      Problem: Infection  Goal: Will remain free from infection  Outcome: PROGRESSING AS EXPECTED  Good hand and oral hygiene promoted. Afebrile, WBCs WNL. Standard precaution in place. Perform hand washing. Administer ABX as prescribed.    Problem: Knowledge Deficit  Goal: Knowledge of disease process/condition, treatment plan, diagnostic tests, and medications will improve  Outcome: PROGRESSING AS EXPECTED  Discuss POC with Pt. Encourage patient to ask questions and be involved in plan of care. Assess Pt's knowledge of disease process, treatment plan, diagnostic test, labs, and medications; educate, and give information as needed.     Problem: Respiratory:  Goal: Respiratory status will improve  Outcome: PROGRESSING SLOWER THAN EXPECTED  Requiring 1-2 L of O2 via NC. Room Air sat 86-87    Problem: Psychosocial Needs:  Goal: Level of anxiety will decrease  Outcome: PROGRESSING SLOWER THAN EXPECTED  Reports high levels of anxiety despite ativan administration.     Problem: Pain Management  Goal: Pain level will decrease to patient's comfort goal  Outcome: PROGRESSING AS EXPECTED  Patient reporting high rates of pain despite oxycodone given.

## 2019-07-23 NOTE — FLOWSHEET NOTE
07/23/19 0624   Interdisciplinary Plan of Care-Goals (Indications)   Bronchodilator Indications History / Diagnosis   Interdisciplinary Plan of Care-Outcomes    Bronchodilator Outcome Patient at Stable Baseline   Education   Education Yes - Pt. / Family has been Instructed in use of Respiratory Medications and Adverse Reactions   RT Assessment of Delivered Medications   Evaluation of Medication Delivery Daily Yes-- Pt /Family has been Instructed in use of Respiratory Medications and Adverse Reactions   SVN Group   #SVN Performed Yes   Given By: Mouthpiece   MDI/DPI Group   #MDI/DPI Given MDI/DPI x 2   Respiratory WDL   Respiratory (WDL) X   Chest Exam   Chest Observation Barrel Chest   Work Of Breathing / Effort Mild   Respiration 17   Pulse 91   Breath Sounds   Pre/Post Intervention Pre Intervention Assessment   RUL Breath Sounds Expiratory Wheezes   RML Breath Sounds Expiratory Wheezes   RLL Breath Sounds Expiratory Wheezes   RODY Breath Sounds Expiratory Wheezes   LLL Breath Sounds Expiratory Wheezes   Secretions   Cough Congested   How Sputum Obtained Spontaneous   Sputum Amount Unable to Evaluate   Oximetry   #Pulse Oximetry (Single Determination) Yes   Oxygen   Pulse Oximetry 94 %   O2 (LPM) 3   O2 Daily Delivery Respiratory  Silicone Nasal Cannula

## 2019-07-23 NOTE — FLOWSHEET NOTE
07/23/19 1428   Interdisciplinary Plan of Care-Goals (Indications)   Bronchodilator Indications History / Diagnosis   Interdisciplinary Plan of Care-Outcomes    Bronchodilator Outcome Patient at Stable Baseline   Education   Education Yes - Pt. / Family has been Instructed in use of Respiratory Medications and Adverse Reactions   RT Assessment of Delivered Medications   Evaluation of Medication Delivery Daily Yes-- Pt /Family has been Instructed in use of Respiratory Medications and Adverse Reactions   SVN Group   #SVN Performed Yes   Given By: Mouthpiece   Respiratory WDL   Respiratory (WDL) X   Chest Exam   Chest Observation Barrel Chest   Work Of Breathing / Effort Mild   Respiration 18   Pulse (!) 101   Breath Sounds   Pre/Post Intervention Pre Intervention Assessment   RUL Breath Sounds Clear   RML Breath Sounds Expiratory Wheezes   RLL Breath Sounds Expiratory Wheezes   RODY Breath Sounds Clear   LLL Breath Sounds Diminished   Secretions   Cough Congested   How Sputum Obtained Spontaneous   Oximetry   #Pulse Oximetry (Single Determination) Yes   Oxygen   Pulse Oximetry 94 %   O2 (LPM) 2   O2 Daily Delivery Respiratory  Silicone Nasal Cannula

## 2019-07-23 NOTE — PROGRESS NOTES
Telemetry Shift Summary    Rhythm SR/ST  HR Range 90-100s  Ectopy Occ PVCs  Measurements .14/.08/.38        Normal Values  Rhythm SR  HR Range    Measurements 0.12-0.20 / 0.06-0.10  / 0.30-0.52

## 2019-07-23 NOTE — CARE PLAN
Problem: Knowledge Deficit  Goal: Knowledge of the prescribed therapeutic regimen will improve  Outcome: PROGRESSING AS EXPECTED  Discussed therapies with pt, including breathing treatments, medications, and CIWA assessment.    Problem: Respiratory:  Goal: Respiratory status will improve  Outcome: PROGRESSING AS EXPECTED  Pt working with PT and receiving breathing treatments.  Educated on cough, deep breathing, HOB and IS use.  Pt states that she feels WoB is improving.

## 2019-07-23 NOTE — PROGRESS NOTES
Telemetry Shift Summary    Rhythm SR/ST  HR Range 80s-120s  Ectopy occ. PVCs  Measurements 0.14/0.08/0.32        Normal Values  Rhythm SR  HR Range    Measurements 0.12-0.20 / 0.06-0.10  / 0.30-0.52

## 2019-07-23 NOTE — FLOWSHEET NOTE
07/23/19 1009   Interdisciplinary Plan of Care-Goals (Indications)   Bronchodilator Indications History / Diagnosis   Interdisciplinary Plan of Care-Outcomes    Bronchodilator Outcome Patient at Stable Baseline   Education   Education Yes - Pt. / Family has been Instructed in use of Respiratory Medications and Adverse Reactions   RT Assessment of Delivered Medications   Evaluation of Medication Delivery Daily Yes-- Pt /Family has been Instructed in use of Respiratory Medications and Adverse Reactions   SVN Group   #SVN Performed Yes   Given By: Mouthpiece   Respiratory WDL   Respiratory (WDL) X   Chest Exam   Chest Observation Barrel Chest   Work Of Breathing / Effort Mild   Respiration 18   Pulse 92   Breath Sounds   Pre/Post Intervention Pre Intervention Assessment   RUL Breath Sounds Clear   RML Breath Sounds Expiratory Wheezes   RLL Breath Sounds Expiratory Wheezes   RODY Breath Sounds Clear   Secretions   Cough Congested   How Sputum Obtained Spontaneous   Oximetry   #Pulse Oximetry (Single Determination) Yes   Oxygen   Pulse Oximetry 92 %   O2 (LPM) 0   O2 Daily Delivery Respiratory  Room Air with O2 Available

## 2019-07-23 NOTE — PROGRESS NOTES
Report received from MANN Glover. Patient up to bathroom with stand by assist. Increased unsteadiness today. Reports right rib pain. Oxycodone not due yet and does not want tylenol. Heat pack given. Declines any other needs at this time. Call light in reach. Bed alarm on.

## 2019-07-23 NOTE — PROGRESS NOTES
Report received from MANN Connors. Plan of care discussed. Patient resting comfortably in bed, declines any further needs at this time. Safety precautions in place.

## 2019-07-23 NOTE — FLOWSHEET NOTE
07/22/19 1917   Events/Summary/Plan   Events/Summary/Plan SVN MDI   Interdisciplinary Plan of Care-Goals (Indications)   Bronchodilator Indications History / Diagnosis   Interdisciplinary Plan of Care-Outcomes    Bronchodilator Outcome Patient at Stable Baseline   Education   Education Yes - Pt. / Family has been Instructed in use of Respiratory Equipment;Yes - Pt. / Family has been Instructed in use of Respiratory Medications and Adverse Reactions   RT Assessment of Delivered Medications   Evaluation of Medication Delivery Daily Yes-- Pt /Family has been Instructed in use of Respiratory Medications and Adverse Reactions   SVN Group   #SVN Performed Yes   Given By: Mouthpiece   MDI/DPI Group   #MDI/DPI Given MDI/DPI x 1   Respiratory WDL   Respiratory (WDL) X   Chest Exam   Respiration 16   Pulse 89   Breath Sounds   Pre/Post Intervention Post Intervention Assessment   RUL Breath Sounds Diminished   RML Breath Sounds Rhonchi   RLL Breath Sounds Diminished   RODY Breath Sounds Expiratory Wheezes;Rhonchi   LLL Breath Sounds Diminished   Oximetry   #Pulse Oximetry (Single Determination) Yes   Oxygen   O2 (LPM) 2   O2 Daily Delivery Respiratory  Silicone Nasal Cannula

## 2019-07-24 PROBLEM — I10 ESSENTIAL HYPERTENSION: Status: ACTIVE | Noted: 2019-07-24

## 2019-07-24 LAB
ANION GAP SERPL CALC-SCNC: 9 MMOL/L (ref 0–11.9)
BUN SERPL-MCNC: 19 MG/DL (ref 8–22)
CALCIUM SERPL-MCNC: 7.9 MG/DL (ref 8.4–10.2)
CHLORIDE SERPL-SCNC: 107 MMOL/L (ref 96–112)
CO2 SERPL-SCNC: 24 MMOL/L (ref 20–33)
CREAT SERPL-MCNC: 0.69 MG/DL (ref 0.5–1.4)
ERYTHROCYTE [DISTWIDTH] IN BLOOD BY AUTOMATED COUNT: 56.2 FL (ref 35.9–50)
GLUCOSE SERPL-MCNC: 146 MG/DL (ref 65–99)
HCT VFR BLD AUTO: 32.8 % (ref 37–47)
HGB BLD-MCNC: 10.5 G/DL (ref 12–16)
MCH RBC QN AUTO: 30.3 PG (ref 27–33)
MCHC RBC AUTO-ENTMCNC: 32 G/DL (ref 33.6–35)
MCV RBC AUTO: 94.8 FL (ref 81.4–97.8)
PLATELET # BLD AUTO: 184 K/UL (ref 164–446)
PMV BLD AUTO: 9.8 FL (ref 9–12.9)
POTASSIUM SERPL-SCNC: 3.3 MMOL/L (ref 3.6–5.5)
RBC # BLD AUTO: 3.46 M/UL (ref 4.2–5.4)
SODIUM SERPL-SCNC: 140 MMOL/L (ref 135–145)
WBC # BLD AUTO: 6.7 K/UL (ref 4.8–10.8)

## 2019-07-24 PROCEDURE — 94640 AIRWAY INHALATION TREATMENT: CPT

## 2019-07-24 PROCEDURE — 700101 HCHG RX REV CODE 250: Performed by: INTERNAL MEDICINE

## 2019-07-24 PROCEDURE — 700102 HCHG RX REV CODE 250 W/ 637 OVERRIDE(OP): Performed by: HOSPITALIST

## 2019-07-24 PROCEDURE — 85027 COMPLETE CBC AUTOMATED: CPT

## 2019-07-24 PROCEDURE — 99232 SBSQ HOSP IP/OBS MODERATE 35: CPT | Performed by: INTERNAL MEDICINE

## 2019-07-24 PROCEDURE — 700102 HCHG RX REV CODE 250 W/ 637 OVERRIDE(OP): Performed by: INTERNAL MEDICINE

## 2019-07-24 PROCEDURE — 770006 HCHG ROOM/CARE - MED/SURG/GYN SEMI*

## 2019-07-24 PROCEDURE — A9270 NON-COVERED ITEM OR SERVICE: HCPCS | Performed by: INTERNAL MEDICINE

## 2019-07-24 PROCEDURE — 700111 HCHG RX REV CODE 636 W/ 250 OVERRIDE (IP): Performed by: INTERNAL MEDICINE

## 2019-07-24 PROCEDURE — A9270 NON-COVERED ITEM OR SERVICE: HCPCS | Performed by: HOSPITALIST

## 2019-07-24 PROCEDURE — 80048 BASIC METABOLIC PNL TOTAL CA: CPT

## 2019-07-24 PROCEDURE — 94760 N-INVAS EAR/PLS OXIMETRY 1: CPT

## 2019-07-24 RX ORDER — AMLODIPINE BESYLATE 5 MG/1
5 TABLET ORAL
Status: DISCONTINUED | OUTPATIENT
Start: 2019-07-24 | End: 2019-07-25 | Stop reason: HOSPADM

## 2019-07-24 RX ORDER — LISINOPRIL 5 MG/1
10 TABLET ORAL
Status: DISCONTINUED | OUTPATIENT
Start: 2019-07-24 | End: 2019-07-25 | Stop reason: HOSPADM

## 2019-07-24 RX ADMIN — BENZONATATE 100 MG: 100 CAPSULE ORAL at 02:43

## 2019-07-24 RX ADMIN — GABAPENTIN 300 MG: 300 CAPSULE ORAL at 17:46

## 2019-07-24 RX ADMIN — LEVALBUTEROL HYDROCHLORIDE 1.25 MG: 1.25 SOLUTION RESPIRATORY (INHALATION) at 07:11

## 2019-07-24 RX ADMIN — NICOTINE 21 MG: 21 PATCH, EXTENDED RELEASE TRANSDERMAL at 05:16

## 2019-07-24 RX ADMIN — FAMOTIDINE 20 MG: 20 TABLET, FILM COATED ORAL at 05:17

## 2019-07-24 RX ADMIN — OXYCODONE HYDROCHLORIDE 15 MG: 5 TABLET ORAL at 17:46

## 2019-07-24 RX ADMIN — LISINOPRIL 10 MG: 5 TABLET ORAL at 11:34

## 2019-07-24 RX ADMIN — FAMOTIDINE 20 MG: 20 TABLET, FILM COATED ORAL at 17:46

## 2019-07-24 RX ADMIN — GUAIFENESIN 1200 MG: 600 TABLET, EXTENDED RELEASE ORAL at 17:46

## 2019-07-24 RX ADMIN — OXYCODONE HYDROCHLORIDE 15 MG: 5 TABLET ORAL at 05:18

## 2019-07-24 RX ADMIN — DOXYCYCLINE 100 MG: 100 TABLET, FILM COATED ORAL at 17:46

## 2019-07-24 RX ADMIN — TIOTROPIUM BROMIDE 1 CAPSULE: 18 CAPSULE ORAL; RESPIRATORY (INHALATION) at 07:15

## 2019-07-24 RX ADMIN — ENOXAPARIN SODIUM 40 MG: 100 INJECTION SUBCUTANEOUS at 05:16

## 2019-07-24 RX ADMIN — ACETAMINOPHEN 650 MG: 325 TABLET, FILM COATED ORAL at 09:02

## 2019-07-24 RX ADMIN — OXYCODONE HYDROCHLORIDE 15 MG: 5 TABLET ORAL at 11:35

## 2019-07-24 RX ADMIN — DOXYCYCLINE 100 MG: 100 TABLET, FILM COATED ORAL at 05:17

## 2019-07-24 RX ADMIN — LEVALBUTEROL HYDROCHLORIDE 1.25 MG: 1.25 SOLUTION RESPIRATORY (INHALATION) at 15:18

## 2019-07-24 RX ADMIN — NICOTINE POLACRILEX 2 MG: 2 GUM, CHEWING BUCCAL at 09:02

## 2019-07-24 RX ADMIN — PREDNISONE 40 MG: 10 TABLET ORAL at 05:17

## 2019-07-24 RX ADMIN — GUAIFENESIN 1200 MG: 600 TABLET, EXTENDED RELEASE ORAL at 05:17

## 2019-07-24 RX ADMIN — LEVALBUTEROL HYDROCHLORIDE 1.25 MG: 1.25 SOLUTION RESPIRATORY (INHALATION) at 10:57

## 2019-07-24 RX ADMIN — POTASSIUM CHLORIDE 40 MEQ: 1500 TABLET, EXTENDED RELEASE ORAL at 05:17

## 2019-07-24 RX ADMIN — LIDOCAINE 1 PATCH: 50 PATCH TOPICAL at 15:13

## 2019-07-24 RX ADMIN — AMLODIPINE BESYLATE 5 MG: 5 TABLET ORAL at 11:35

## 2019-07-24 RX ADMIN — GABAPENTIN 300 MG: 300 CAPSULE ORAL at 11:35

## 2019-07-24 RX ADMIN — BUDESONIDE AND FORMOTEROL FUMARATE DIHYDRATE 2 PUFF: 160; 4.5 AEROSOL RESPIRATORY (INHALATION) at 07:15

## 2019-07-24 RX ADMIN — GABAPENTIN 300 MG: 300 CAPSULE ORAL at 05:17

## 2019-07-24 ASSESSMENT — ENCOUNTER SYMPTOMS
SPUTUM PRODUCTION: 0
DIZZINESS: 0
HEARTBURN: 0
WHEEZING: 1
NECK PAIN: 0
FEVER: 0
HEADACHES: 1
NERVOUS/ANXIOUS: 1
WEAKNESS: 1
PALPITATIONS: 0
DIAPHORESIS: 0
COUGH: 1
DIARRHEA: 0
BLOOD IN STOOL: 0
SHORTNESS OF BREATH: 1
SORE THROAT: 0
DEPRESSION: 0
FOCAL WEAKNESS: 0
TREMORS: 1
BACK PAIN: 1
ABDOMINAL PAIN: 0

## 2019-07-24 NOTE — PROGRESS NOTES
Telemetry Shift Summary    Rhythm SR  HR Range 90s  Ectopy none  Measurements .16/.08/.36        Normal Values  Rhythm SR  HR Range    Measurements 0.12-0.20 / 0.06-0.10  / 0.30-0.52

## 2019-07-24 NOTE — PROGRESS NOTES
Report received from Ana DARNELL. No new changes. Will get an ambulatory spo2 this am. Patient was 87% on RA this am. 1L placed on Patient. patietn was hypertensive this am. No PRN orders for high BP. Patient did state she has a headache. Will administer pain meds per order. Bed in low locked position, call bell within reach. Continue to monitor.

## 2019-07-24 NOTE — PROGRESS NOTES
Patient resting in bed. Pain meds given. New orders for BP meds given. Bed in low locked position, call bell within reach. Continue to monitor.

## 2019-07-24 NOTE — FLOWSHEET NOTE
07/23/19 1922   Events/Summary/Plan   Events/Summary/Plan SVN MDI   Interdisciplinary Plan of Care-Goals (Indications)   Bronchodilator Indications History / Diagnosis   Interdisciplinary Plan of Care-Outcomes    Bronchodilator Outcome Patient at Stable Baseline   Education   Education Yes - Pt. / Family has been Instructed in use of Respiratory Equipment;Yes - Pt. / Family has been Instructed in use of Respiratory Medications and Adverse Reactions   RT Assessment of Delivered Medications   Evaluation of Medication Delivery Daily Yes-- Pt /Family has been Instructed in use of Respiratory Medications and Adverse Reactions   SVN Group   #SVN Performed Yes   Given By: Mouthpiece   MDI/DPI Group   #MDI/DPI Given MDI/DPI x 1   Incentive Spirometry Group   Incentive Spirometry Instruction Yes   Breathing Exercises Yes   Incentive Spirometer Volume 1500 mL   Respiratory WDL   Respiratory (WDL) X   Chest Exam   Respiration 16   Pulse 86   Breath Sounds   Pre/Post Intervention Post Intervention Assessment   RUL Breath Sounds Clear   RML Breath Sounds Diminished   RLL Breath Sounds Diminished   RODY Breath Sounds Clear   LLL Breath Sounds Diminished   Oximetry   #Pulse Oximetry (Single Determination) Yes   Oxygen   Pulse Oximetry 96 %   O2 (LPM) 2   O2 Daily Delivery Respiratory  Silicone Nasal Cannula

## 2019-07-24 NOTE — CARE PLAN
Problem: Safety  Goal: Will remain free from falls  Outcome: PROGRESSING AS EXPECTED    Intervention: Assess risk factors for falls   07/23/19 2222   OTHER   Fall Risk Risk to Fall - 0 - 1 point   Risk for Injury-Any positive answers results in the pt being at high risk for fall related injury Not Applicable   Mobility Status Assessment 1-1 Healthcare Provider Required for Assistance with Ambulation & Transfer   History of fall 0   Pt Calls for Assistance Yes     Intervention: Implement fall precautions   07/23/19 2222   OTHER   Environmental Precautions Treaded Slipper Socks on Patient;Personal Belongings, Wastebasket, Call Bell etc. in Easy Reach;Report Given to Other Health Care Providers Regarding Fall Risk;Bed in Low Position;Communication Sign for Patients & Families;Mobility Assessed & Appropriate Sign Placed   IV Pole on Same Side of Bed as Bathroom Yes         Problem: Knowledge Deficit  Goal: Knowledge of disease process/condition, treatment plan, diagnostic tests, and medications will improve  Outcome: PROGRESSING AS EXPECTED  Discussed POC involving medications, continued RT protocol with breathing treatments.

## 2019-07-24 NOTE — FLOWSHEET NOTE
07/24/19 1101   Events/Summary/Plan   Events/Summary/Plan Pt sats on RA 88-89%, placed pt on NC at .75 sats came back up ton 90%. Tx given   Non-Invasive Resp Device Site Inspection Completed Intact   Interdisciplinary Plan of Care-Goals (Indications)   Bronchodilator Indications History / Diagnosis   Interdisciplinary Plan of Care-Outcomes    Bronchodilator Outcome Patient at Stable Baseline   Education   Education Yes - Pt. / Family has been Instructed in use of Respiratory Medications and Adverse Reactions;Yes - Pt. / Family has been Instructed in use of Respiratory Equipment   RT Assessment of Delivered Medications   Evaluation of Medication Delivery Daily Yes-- Pt /Family has been Instructed in use of Respiratory Medications and Adverse Reactions   SVN Group   #SVN Performed Yes   Given By: Mouthpiece   Chest Exam   Respiration 18   Pulse 86   Breath Sounds   Pre/Post Intervention Post Intervention Assessment   RUL Breath Sounds Clear;Expiratory Wheezes   RML Breath Sounds Clear;Diminished   RLL Breath Sounds Diminished   RODY Breath Sounds Clear;Expiratory Wheezes   LLL Breath Sounds Diminished   Oximetry   #Pulse Oximetry (Single Determination) Yes   Oxygen   Pulse Oximetry 91 %   O2 (LPM) 1   O2 Daily Delivery Respiratory  Silicone Nasal Cannula

## 2019-07-24 NOTE — FLOWSHEET NOTE
07/24/19 0715   Interdisciplinary Plan of Care-Goals (Indications)   Bronchodilator Indications History / Diagnosis   Interdisciplinary Plan of Care-Outcomes    Bronchodilator Outcome Patient at Stable Baseline   Education   Education Yes - Pt. / Family has been Instructed in use of Respiratory Medications and Adverse Reactions   RT Assessment of Delivered Medications   Evaluation of Medication Delivery Daily Yes-- Pt /Family has been Instructed in use of Respiratory Medications and Adverse Reactions   SVN Group   #SVN Performed Yes   Given By: Mouthpiece   MDI/DPI Group   #MDI/DPI Given MDI/DPI x 2   Respiratory WDL   Respiratory (WDL) X   Chest Exam   Chest Observation Barrel Chest   Work Of Breathing / Effort Mild   Respiration 17   Pulse 87   Breath Sounds   Pre/Post Intervention Pre Intervention Assessment   RUL Breath Sounds Clear   RML Breath Sounds Clear   RLL Breath Sounds Diminished   RODY Breath Sounds Clear   LLL Breath Sounds Diminished   Secretions   Cough Moist;Non Productive   Oximetry   #Pulse Oximetry (Single Determination) Yes   Oxygen   Pulse Oximetry 92 %   O2 (LPM) 0   O2 Daily Delivery Respiratory  Room Air with O2 Available

## 2019-07-24 NOTE — PROGRESS NOTES
1900: Received report from Dory DARNELL. Pt awake, sitting up in bed. Voices no questions or concerns at this time. Discussed night routine, medications due this shift and labs in the morning.   2000: Assessment completed. Pt states lidocaine patches to neck and back are helpful in pain control. States baseline chronic pain as 6/10 and is currently a 7/10 and feels this is acceptable comfort goal. Additionally is requesting more ativan, discussed that this medication is d/cd, if she becomes anxious/hunter Leung will be notified, agrees to plan.   2300: pt requesting prn oxycodone for neck and back pain.   0500: pt slept throughout night with no issues. Requesting prn oxycodone, medicated per mar along with all other due meds.   0700: Report given to Vanessa DARNELL.

## 2019-07-24 NOTE — ASSESSMENT & PLAN NOTE
Blood pressure is elevated with patient complaining of a headache  Will start lisinopril and norvasc

## 2019-07-24 NOTE — FLOWSHEET NOTE
07/24/19 1519   Interdisciplinary Plan of Care-Goals (Indications)   Bronchodilator Indications History / Diagnosis   Interdisciplinary Plan of Care-Outcomes    Bronchodilator Outcome Patient at Stable Baseline   Education   Education Yes - Pt. / Family has been Instructed in use of Respiratory Medications and Adverse Reactions   RT Assessment of Delivered Medications   Evaluation of Medication Delivery Daily Yes-- Pt /Family has been Instructed in use of Respiratory Medications and Adverse Reactions   SVN Group   #SVN Performed Yes   Given By: Mouthpiece   Respiratory WDL   Respiratory (WDL) X   Chest Exam   Work Of Breathing / Effort Mild   Respiration 18   Pulse 97   Breath Sounds   Pre/Post Intervention Pre Intervention Assessment   RUL Breath Sounds Clear   RML Breath Sounds Clear;Diminished   RLL Breath Sounds Diminished   RODY Breath Sounds Clear;Expiratory Wheezes;Diminished   Secretions   Cough Moist;Non Productive   How Sputum Obtained Spontaneous   Oximetry   #Pulse Oximetry (Single Determination) Yes   Oxygen   Pulse Oximetry 91 %   O2 (LPM) 1   O2 Daily Delivery Respiratory  Silicone Nasal Cannula

## 2019-07-24 NOTE — PROGRESS NOTES
Patient resting in bed. No complaints. Bed in low locked position, call bell within reach. Continue to monitor.

## 2019-07-24 NOTE — PROGRESS NOTES
Hospital Medicine Daily Progress Note    Date of Service  7/24/2019    Chief Complaint  64 y.o. female admitted 7/20/2019 with SOB    Hospital Course    History of tobacco, alcohol use, anxiety admitted for shortness of breath found to have COPD exacerbation and likely pneumonia. She is hypoxic requiring oxygen and is not on oxygen at home. She has chronic pain and anxiety.      Interval Problem Update  7/21: Tolerating Solu-Medrol but it makes her anxious, treating with Ativan.  Tolerating Unasyn and doxy.  Still wheezy, on 3 L  7/22: Remains on 2 to 3 L, very wheezy, anxious.  Nebulizer changed to Xopenex, Symbicort and Spiriva added.  Echo normal  7/23 the patient feels anxious and is asking for medication for her headache and tight neck muscles  7/24  is at the bedside, the patient feels less anxious on oral steroids, oxygen saturation is over 90% on room air at rest    Consultants/Specialty  None    Code Status  DNR/DNI    Disposition  Check oxygen saturations and discharge home with oxygen if meets criteria with ambulation    Review of Systems  Review of Systems   Constitutional: Negative for diaphoresis and fever.   HENT: Negative for sore throat.    Respiratory: Positive for cough, shortness of breath and wheezing. Negative for sputum production.    Cardiovascular: Negative for chest pain, palpitations and leg swelling.   Gastrointestinal: Negative for abdominal pain, blood in stool, diarrhea and heartburn.   Genitourinary: Negative for dysuria, frequency and urgency.   Musculoskeletal: Positive for back pain. Negative for neck pain.        Chronic back pain   Skin: Negative for itching and rash.   Neurological: Positive for tremors, weakness and headaches. Negative for dizziness and focal weakness.        Shaky after breathing treatments   Psychiatric/Behavioral: Negative for depression. The patient is nervous/anxious.         Physical Exam  Temp:  [36.4 °C (97.5 °F)-36.7 °C (98.1 °F)] 36.5 °C (97.7  °F)  Pulse:  [] 81  Resp:  [16-18] 18  BP: (139-167)/(81-91) 167/89  SpO2:  [88 %-96 %] 91 %    Physical Exam   Constitutional: She is oriented to person, place, and time. No distress.   HENT:   Mouth/Throat: Oropharynx is clear and moist. No oropharyngeal exudate.   Eyes: Pupils are equal, round, and reactive to light. EOM are normal. No scleral icterus.   Neck: Neck supple. No tracheal deviation present.   Cardiovascular: Normal rate and regular rhythm.    No murmur heard.  Pulmonary/Chest: Effort normal. No respiratory distress. She has wheezes. She has rales.   Improved breath sounds to  Lung bases today   Abdominal: Soft. Bowel sounds are normal. She exhibits no distension.   Musculoskeletal: She exhibits no edema or tenderness.   Neurological: She is alert and oriented to person, place, and time. She displays tremor. No cranial nerve deficit. Coordination normal.   Skin: Skin is warm and dry. No rash noted. She is not diaphoretic.   Psychiatric:   Anxious       Fluids    Intake/Output Summary (Last 24 hours) at 07/24/19 1022  Last data filed at 07/23/19 1922   Gross per 24 hour   Intake              350 ml   Output                0 ml   Net              350 ml       Laboratory  Recent Labs      07/22/19   0420  07/23/19   0432  07/24/19   0417   WBC  10.6  9.5  6.7   RBC  3.52*  3.29*  3.46*   HEMOGLOBIN  10.8*  9.9*  10.5*   HEMATOCRIT  33.7*  31.3*  32.8*   MCV  95.7  95.1  94.8   MCH  30.7  30.1  30.3   MCHC  32.0*  31.6*  32.0*   RDW  56.8*  57.9*  56.2*   PLATELETCT  196  208  184   MPV  9.9  9.8  9.8     Recent Labs      07/22/19   0420  07/23/19   0432  07/24/19   0417   SODIUM  139  139  140   POTASSIUM  3.9  3.3*  3.3*   CHLORIDE  109  104  107   CO2  21  24  24   GLUCOSE  154*  176*  146*   BUN  12  17  19   CREATININE  0.71  0.69  0.69   CALCIUM  7.8*  7.7*  7.9*                   Imaging  EC-ECHOCARDIOGRAM COMPLETE W/O CONT   Final Result      US-EXTREMITY VENOUS LOWER BILAT   Final Result       No evidence of Bilateral  lower extremity deep venous thrombosis.            INTERPRETING LOCATION: [address], [place], [address]      DX-CHEST-PORTABLE (1 VIEW)   Final Result      No acute cardiopulmonary abnormality.           Assessment/Plan  * Acute exacerbation of chronic obstructive pulmonary disease (COPD) ([facility])   Assessment & Plan    Wheezing improving slowly, oxygen saturation is slowly improving with 94% measured on room air at rest today  Continue oral steroids, will need prolonged taper  Continue symbicort and xopenex     Essential hypertension   Assessment & Plan    Blood pressure is elevated with patient complaining of a headache  Will start lisinopril and norvasc      Hypernatremia   Assessment & Plan    Resolved with fluids     Hypokalemia   Assessment & Plan    Replacement ordered again today     Alcohol abuse   Assessment & Plan    Patient previously was abstinent but relapsed during her trip from [place]  No withdrawal symptoms     Anxiety   Assessment & Plan    Patient is very fidgety presumably from steroids and breathing treatments  Slightly better off iv steroids, will ambulate today     Acute respiratory failure with hypoxemia ([facility])   Assessment & Plan    Due to COPD exacerbation cannot rule out pneumonia  Continue doxycycline orally     Tobacco abuse- (present on admission)   Assessment & Plan    Counseled  Patch and gum provided          VTE prophylaxis: Lovenox

## 2019-07-24 NOTE — PROGRESS NOTES
Hospital Medicine Daily Progress Note    Date of Service  7/23/2019    Chief Complaint  64 y.o. female admitted 7/20/2019 with SOB    Hospital Course    History of tobacco, alcohol use, anxiety admitted for shortness of breath found to have COPD exacerbation and likely pneumonia. She is hypoxic requiring oxygen and is not on oxygen at home. She has chronic pain and anxiety.      Interval Problem Update  7/21: Tolerating Solu-Medrol but it makes her anxious, treating with Ativan.  Tolerating Unasyn and doxy.  Still wheezy, on 3 L  7/22: Remains on 2 to 3 L, very wheezy, anxious.  Nebulizer changed to Xopenex, Symbicort and Spiriva added.  Echo normal  7/23 the patient feels anxious and is asking for medication for her headache and tight neck muscles    Consultants/Specialty  None    Code Status  DNR/DNI    Disposition  Check oxygen saturations and discharge home with oxygen if meets criteria    Review of Systems  Review of Systems   Constitutional: Positive for malaise/fatigue. Negative for diaphoresis and fever.   HENT: Negative for sore throat.    Respiratory: Positive for cough, sputum production, shortness of breath and wheezing.    Cardiovascular: Negative for chest pain and leg swelling.   Gastrointestinal: Negative for abdominal pain, blood in stool, diarrhea, heartburn and nausea.   Genitourinary: Negative for dysuria and frequency.   Musculoskeletal: Positive for back pain and neck pain.        Chronic back pain   Skin: Negative for itching and rash.   Neurological: Positive for tremors, weakness and headaches. Negative for dizziness, tingling and focal weakness.        Shaky after breathing treatments   Psychiatric/Behavioral: Negative for depression. The patient is nervous/anxious.         Physical Exam  Temp:  [36.4 °C (97.5 °F)-36.8 °C (98.2 °F)] 36.7 °C (98 °F)  Pulse:  [] 101  Resp:  [16-18] 18  BP: (132-153)/(75-91) 150/81  SpO2:  [86 %-95 %] 94 %    Physical Exam   Constitutional: She is  oriented to person, place, and time. She appears distressed.   HENT:   Mouth/Throat: Oropharynx is clear and moist. No oropharyngeal exudate.   Eyes: Pupils are equal, round, and reactive to light. Conjunctivae and EOM are normal.   Neck: Normal range of motion. Neck supple. No JVD present. No tracheal deviation present.   Cardiovascular: Normal rate and regular rhythm.    No murmur heard.  Pulmonary/Chest: Effort normal. No respiratory distress. She has wheezes. She has rales.   Abdominal: Soft. She exhibits no distension. There is no rebound and no guarding.   Musculoskeletal: She exhibits no edema or tenderness.   Neurological: She is alert and oriented to person, place, and time. She displays tremor. No cranial nerve deficit. Coordination normal.   Skin: Skin is dry. No rash noted. She is not diaphoretic.   Psychiatric:   Anxious       Fluids    Intake/Output Summary (Last 24 hours) at 07/23/19 1819  Last data filed at 07/23/19 0602   Gross per 24 hour   Intake              100 ml   Output              200 ml   Net             -100 ml       Laboratory  Recent Labs      07/21/19   0426  07/22/19   0420  07/23/19 0432   WBC  7.6  10.6  9.5   RBC  3.68*  3.52*  3.29*   HEMOGLOBIN  11.0*  10.8*  9.9*   HEMATOCRIT  34.1*  33.7*  31.3*   MCV  92.7  95.7  95.1   MCH  29.9  30.7  30.1   MCHC  32.3*  32.0*  31.6*   RDW  53.4*  56.8*  57.9*   PLATELETCT  217  196  208   MPV  9.2  9.9  9.8     Recent Labs      07/21/19   0426  07/22/19   0420  07/23/19   0432   SODIUM  141  139  139   POTASSIUM  3.7  3.9  3.3*   CHLORIDE  113*  109  104   CO2  20  21  24   GLUCOSE  144*  154*  176*   BUN  13  12  17   CREATININE  0.79  0.71  0.69   CALCIUM  7.9*  7.8*  7.7*     Recent Labs      07/20/19 2005   INR  0.89               Imaging  EC-ECHOCARDIOGRAM COMPLETE W/O CONT   Final Result      US-EXTREMITY VENOUS LOWER BILAT   Final Result      No evidence of Bilateral  lower extremity deep venous thrombosis.             INTERPRETING LOCATION: [address] [place] [address] [place], [id number]      DX-CHEST-PORTABLE (1 VIEW)   Final Result      No acute cardiopulmonary abnormality.           Assessment/Plan  * Acute exacerbation of chronic obstructive pulmonary disease (COPD) (HCC)   Assessment & Plan    Wheezing improving slowly, oxygen need persists, saturation is 87% on room air at rest as checked by myself  Change to oral steroids, continue RT protocol, mucolytics  Continue Doxy   She has never had pulmonary function testing, this should be done as an outpatient in 6 to 8 weeks  Continue Symbicort and Spiriva   xopenex to reduce anxiety potential  Mucinex     Hypernatremia   Assessment & Plan    Resolved with fluids     Hypokalemia   Assessment & Plan    Replace     Alcohol abuse   Assessment & Plan    Patient previously was abstinent but relapsed during her trip from [place]  No withdrawal symptoms currently     Anxiety   Assessment & Plan    Patient is very fidgety presumably from steroids and breathing treatments  Ativan given but patient with hypoxia and on chronic oxycodone, will stop ativan and taper to oral steroids to diminish anxiety     Acute respiratory failure with hypoxemia ([facility])   Assessment & Plan    Due to COPD exacerbation cannot rule out pneumonia  Continue doxy, unasyn given but no growth in cultures     Tobacco abuse- (present on admission)   Assessment & Plan    Counseled  Patch and gum provided        Will order lidoderm patch for back and neck  Change oxycodone to as needed for back pain as this can exacerbate her hypoxia    VTE prophylaxis: Lovenox

## 2019-07-25 VITALS
TEMPERATURE: 98.3 F | HEART RATE: 69 BPM | OXYGEN SATURATION: 94 % | HEIGHT: 59 IN | WEIGHT: 115.3 LBS | RESPIRATION RATE: 18 BRPM | DIASTOLIC BLOOD PRESSURE: 95 MMHG | BODY MASS INDEX: 23.24 KG/M2 | SYSTOLIC BLOOD PRESSURE: 152 MMHG

## 2019-07-25 PROBLEM — J96.01 ACUTE RESPIRATORY FAILURE WITH HYPOXEMIA (HCC): Status: RESOLVED | Noted: 2019-07-20 | Resolved: 2019-07-25

## 2019-07-25 PROBLEM — E87.0 HYPERNATREMIA: Status: RESOLVED | Noted: 2019-07-20 | Resolved: 2019-07-25

## 2019-07-25 PROCEDURE — 700102 HCHG RX REV CODE 250 W/ 637 OVERRIDE(OP): Performed by: HOSPITALIST

## 2019-07-25 PROCEDURE — 94760 N-INVAS EAR/PLS OXIMETRY 1: CPT

## 2019-07-25 PROCEDURE — A9270 NON-COVERED ITEM OR SERVICE: HCPCS | Performed by: HOSPITALIST

## 2019-07-25 PROCEDURE — 700111 HCHG RX REV CODE 636 W/ 250 OVERRIDE (IP): Performed by: INTERNAL MEDICINE

## 2019-07-25 PROCEDURE — 700101 HCHG RX REV CODE 250: Performed by: INTERNAL MEDICINE

## 2019-07-25 PROCEDURE — 700102 HCHG RX REV CODE 250 W/ 637 OVERRIDE(OP): Performed by: INTERNAL MEDICINE

## 2019-07-25 PROCEDURE — 90670 PCV13 VACCINE IM: CPT | Performed by: INTERNAL MEDICINE

## 2019-07-25 PROCEDURE — 99239 HOSP IP/OBS DSCHRG MGMT >30: CPT | Performed by: INTERNAL MEDICINE

## 2019-07-25 PROCEDURE — A9270 NON-COVERED ITEM OR SERVICE: HCPCS | Performed by: INTERNAL MEDICINE

## 2019-07-25 PROCEDURE — 94640 AIRWAY INHALATION TREATMENT: CPT

## 2019-07-25 PROCEDURE — 90471 IMMUNIZATION ADMIN: CPT

## 2019-07-25 RX ORDER — LEVALBUTEROL INHALATION SOLUTION 1.25 MG/3ML
1.25 SOLUTION RESPIRATORY (INHALATION) 4 TIMES DAILY
Qty: 24 ML | Refills: 3 | Status: SHIPPED | OUTPATIENT
Start: 2019-07-25 | End: 2019-09-10

## 2019-07-25 RX ORDER — PREDNISONE 10 MG/1
TABLET ORAL
Qty: 50 TAB | Refills: 0 | Status: SHIPPED | OUTPATIENT
Start: 2019-07-26 | End: 2019-08-14

## 2019-07-25 RX ORDER — BUDESONIDE AND FORMOTEROL FUMARATE DIHYDRATE 160; 4.5 UG/1; UG/1
2 AEROSOL RESPIRATORY (INHALATION) 2 TIMES DAILY
Qty: 1 INHALER | Refills: 2 | Status: SHIPPED | OUTPATIENT
Start: 2019-07-25 | End: 2019-08-05

## 2019-07-25 RX ORDER — LISINOPRIL 10 MG/1
10 TABLET ORAL DAILY
Qty: 30 TAB | Refills: 2 | Status: SHIPPED | OUTPATIENT
Start: 2019-07-26 | End: 2019-09-10 | Stop reason: SDUPTHER

## 2019-07-25 RX ORDER — TIOTROPIUM BROMIDE 18 UG/1
18 CAPSULE ORAL; RESPIRATORY (INHALATION) DAILY
Qty: 30 CAP | Refills: 3 | Status: SHIPPED | OUTPATIENT
Start: 2019-07-25 | End: 2019-09-10

## 2019-07-25 RX ORDER — FAMOTIDINE 20 MG/1
20 TABLET, FILM COATED ORAL 2 TIMES DAILY
Qty: 60 TAB | Refills: 0 | Status: SHIPPED | OUTPATIENT
Start: 2019-07-25 | End: 2024-02-22

## 2019-07-25 RX ORDER — AMLODIPINE BESYLATE 5 MG/1
5 TABLET ORAL DAILY
Qty: 30 TAB | Refills: 2 | Status: SHIPPED | OUTPATIENT
Start: 2019-07-26

## 2019-07-25 RX ORDER — GUAIFENESIN 1200 MG/1
1200 TABLET, EXTENDED RELEASE ORAL EVERY 12 HOURS
Qty: 28 TAB | Refills: 0 | Status: SHIPPED | OUTPATIENT
Start: 2019-07-25 | End: 2019-08-01

## 2019-07-25 RX ADMIN — OXYCODONE HYDROCHLORIDE 15 MG: 5 TABLET ORAL at 00:00

## 2019-07-25 RX ADMIN — TIOTROPIUM BROMIDE 1 CAPSULE: 18 CAPSULE ORAL; RESPIRATORY (INHALATION) at 08:39

## 2019-07-25 RX ADMIN — BUDESONIDE AND FORMOTEROL FUMARATE DIHYDRATE 2 PUFF: 160; 4.5 AEROSOL RESPIRATORY (INHALATION) at 08:39

## 2019-07-25 RX ADMIN — ENOXAPARIN SODIUM 40 MG: 100 INJECTION SUBCUTANEOUS at 06:09

## 2019-07-25 RX ADMIN — GUAIFENESIN 1200 MG: 600 TABLET, EXTENDED RELEASE ORAL at 06:10

## 2019-07-25 RX ADMIN — NICOTINE 21 MG: 21 PATCH, EXTENDED RELEASE TRANSDERMAL at 06:09

## 2019-07-25 RX ADMIN — POTASSIUM CHLORIDE 40 MEQ: 1500 TABLET, EXTENDED RELEASE ORAL at 06:10

## 2019-07-25 RX ADMIN — LISINOPRIL 10 MG: 5 TABLET ORAL at 06:11

## 2019-07-25 RX ADMIN — FAMOTIDINE 20 MG: 20 TABLET, FILM COATED ORAL at 06:10

## 2019-07-25 RX ADMIN — OXYCODONE HYDROCHLORIDE 15 MG: 5 TABLET ORAL at 06:10

## 2019-07-25 RX ADMIN — PREDNISONE 40 MG: 10 TABLET ORAL at 06:14

## 2019-07-25 RX ADMIN — LEVALBUTEROL HYDROCHLORIDE 1.25 MG: 1.25 SOLUTION RESPIRATORY (INHALATION) at 08:38

## 2019-07-25 RX ADMIN — AMLODIPINE BESYLATE 5 MG: 5 TABLET ORAL at 06:11

## 2019-07-25 RX ADMIN — PNEUMOCOCCAL 13-VALENT CONJUGATE VACCINE 0.5 ML: 2.2; 2.2; 2.2; 2.2; 2.2; 4.4; 2.2; 2.2; 2.2; 2.2; 2.2; 2.2; 2.2 INJECTION, SUSPENSION INTRAMUSCULAR at 11:15

## 2019-07-25 RX ADMIN — DOXYCYCLINE 100 MG: 100 TABLET, FILM COATED ORAL at 06:10

## 2019-07-25 RX ADMIN — GABAPENTIN 300 MG: 300 CAPSULE ORAL at 06:12

## 2019-07-25 RX ADMIN — GABAPENTIN 300 MG: 300 CAPSULE ORAL at 11:17

## 2019-07-25 NOTE — DISCHARGE PLANNING
LSW spoke with pharmacist regarding pts Rx Levalbuterol. Pharmacist needs serial number of nebulizer in order to run Rx. LSW informed her that nebulizer machine was ordered and pt or LSW can call with that information after nebulizer is delivered.

## 2019-07-25 NOTE — FLOWSHEET NOTE
07/25/19 0739   Events/Summary/Plan   Events/Summary/Plan Pt eating at this moment. Will come back to do tx   Therapy Not Performed   Type of Therapy Not Performed SVN;MDI

## 2019-07-25 NOTE — DISCHARGE PLANNING
Received Choice form at [phone]  Agency/Facility Name: [facility]  Referral sent per Choice form @ [phone]

## 2019-07-25 NOTE — DISCHARGE PLANNING
Anticipated Discharge Disposition: home with nebulizer    Action: LSW spoke with pt about home nebulizer. Pt signed choice form for [facility]. LSW faxed choice to [facility]    Barriers to Discharge: nebulizer delivery     Plan: LSW to f/u with [facility]

## 2019-07-25 NOTE — CARE PLAN
Problem: Safety  Goal: Will remain free from falls  Outcome: PROGRESSING AS EXPECTED    Intervention: Assess risk factors for falls   07/25/19 0230   OTHER   Fall Risk Risk to Fall - 0 - 1 point   Risk for Injury-Any positive answers results in the pt being at high risk for fall related injury Not Applicable   Mobility Status Assessment 1-1 Healthcare Provider Required for Assistance with Ambulation & Transfer   History of fall 0   Pt Calls for Assistance Yes     Intervention: Implement fall precautions   07/25/19 0230   OTHER   Environmental Precautions Treaded Slipper Socks on Patient;Personal Belongings, Wastebasket, Call Bell etc. in Easy Reach;Communication Sign for Patients & Families;Bed in Low Position;Report Given to Other Health Care Providers Regarding Fall Risk;Mobility Assessed & Appropriate Sign Placed         Problem: Infection  Goal: Will remain free from infection  Outcome: PROGRESSING AS EXPECTED  WBC 6.7, VSS afebrile. No s/s infection. Standard precautions in place.       Problem: Respiratory:  Goal: Respiratory status will improve  Outcome: PROGRESSING AS EXPECTED  RT scheduled. Pt requiring o2 NC 1-2 L overnight to maintain sats>90%. Sats 87-89 at RA while sitting in bed. Denies SOB. Continues to having wheezing throughout with diminished bases. Encouraged deep breathing and IS.

## 2019-07-25 NOTE — FACE TO FACE
Face to Face Note  -  Durable Medical Equipment    Dilma Remy M.D. - NPI: [id number]  I certify that this patient is under my care and that they had a durable medical equipment(DME)face to face encounter by myself that meets the physician DME face-to-face encounter requirements with this patient on:    Date of encounter:   Patient:                    MRN:                       YOB: 2021  Antonieta QureshiGlendy  [id number]  1955     The encounter with the patient was in whole, or in part, for the following medical condition, which is the primary reason for durable medical equipment:  COPD    I certify that, based on my findings, the following durable medical equipment is medically necessary:  Nebulizer.    HOME O2 Saturation Measurements:(Values must be present for Home Oxygen orders)         ,     ,         My Clinical findings support the need for the above equipment due to:  Wheezing (Chronic)    Supporting Symptoms:shortness of breath and wheezing worse with exertion

## 2019-07-25 NOTE — PROGRESS NOTES
Report received. Assumed care of pt. A/O x4. VSS. Responds appropriately. Assessment completed. Explained importance of calling before getting OOB. Call light and belongings within reach. Bed in the lowest position. Treaded socks in place. Hourly rounding in progress. Safety precautions in place

## 2019-07-25 NOTE — FLOWSHEET NOTE
07/25/19 1123   Events/Summary/Plan   Events/Summary/Plan Pt is getting ready to DC. Pt is getting ready to DC   Therapy Not Performed   Type of Therapy Not Performed SVN   Reason Therapy Not Performed Discharged

## 2019-07-25 NOTE — FLOWSHEET NOTE
07/24/19 9409   Events/Summary/Plan   Events/Summary/Plan patient requests to hold treatment until she is ready for them. RN notified   Therapy Not Performed   Type of Therapy Not Performed SVN;MDI   Reason Therapy Not Performed Refused   Oxygen   Pulse Oximetry 93 %   O2 Daily Delivery Respiratory  Room Air with O2 Available

## 2019-07-25 NOTE — PROGRESS NOTES
Discharge instructions given, Chris in [place] to provide nebulizer medications, follow up on August 1st

## 2019-07-25 NOTE — DISCHARGE INSTRUCTIONS
Discharge Instructions    Discharged to home by car with relative. Discharged via walking, hospital escort: Refused.  Special equipment needed: Not Applicable    Be sure to schedule a follow-up appointment with your primary care doctor or any specialists as instructed.     Discharge Plan:   Diet Plan: Discussed  Activity Level: Discussed  Smoking Cessation Offered: Patient Counseled  Confirmed Follow up Appointment: Appointment Scheduled  Confirmed Symptoms Management: Discussed  Medication Reconciliation Updated: Yes  Influenza Vaccine Indication: Indicated: Not available from distributor/    I understand that a diet low in cholesterol, fat, and sodium is recommended for good health. Unless I have been given specific instructions below for another diet, I accept this instruction as my diet prescription.   Other diet: Regular    Special Instructions: None    · Is patient discharged on Warfarin / Coumadin?   No     Chronic Obstructive Pulmonary Disease Exacerbation  Chronic obstructive pulmonary disease (COPD) is a common lung problem. In COPD, the flow of air from the lungs is limited. COPD exacerbations are times that breathing gets worse and you need extra treatment. Without treatment they can be life threatening. If they happen often, your lungs can become more damaged. If your COPD gets worse, your doctor may treat you with:  · Medicines.  · Oxygen.  · Different ways to clear your airway, such as using a mask.  Follow these instructions at home:  · Do not smoke.  · Avoid tobacco smoke and other things that bother your lungs.  · If given, take your antibiotic medicine as told. Finish the medicine even if you start to feel better.  · Only take medicines as told by your doctor.  · Drink enough fluids to keep your pee (urine) clear or pale yellow (unless your doctor has told you not to).  · Use a cool mist machine (vaporizer).  · If you use oxygen or a machine that turns liquid medicine into a mist  (nebulizer), continue to use them as told.  · Keep up with shots (vaccinations) as told by your doctor.  · Exercise regularly.  · Eat healthy foods.  · Keep all doctor visits as told.  Get help right away if:  · You are very short of breath and it gets worse.  · You have trouble talking.  · You have bad chest pain.  · You have blood in your spit (sputum).  · You have a fever.  · You keep throwing up (vomiting).  · You feel weak, or you pass out (faint).  · You feel confused.  · You keep getting worse.  This information is not intended to replace advice given to you by your health care provider. Make sure you discuss any questions you have with your health care provider.  Document Released: 12/06/2012 Document Revised: 05/25/2017 Document Reviewed: 08/22/2014  [employer] Patient Education © 2017 [employer]  Prednisone  What is this medicine?  PREDNISONE (PRED ni sone) is a corticosteroid. It is commonly used to treat inflammation of the skin, joints, lungs, and other organs. Common conditions treated include asthma, allergies, and arthritis. It is also used for other conditions, such as blood disorders and diseases of the adrenal glands.  This medicine may be used for other purposes; ask your health care provider or pharmacist if you have questions.  COMMON BRAND NAME(S): MARY  What should I tell my health care provider before I take this medicine?  They need to know if you have any of these conditions:  -Cushing's syndrome  -diabetes  -glaucoma  -heart disease  -high blood pressure  -infection (especially a virus infection such as chickenpox, cold sores, or herpes)  -kidney disease  -liver disease  -mental illness  -myasthenia gravis  -osteoporosis  -seizures  -stomach or intestine problems  -thyroid disease  -an unusual or allergic reaction to lactose, prednisone, other medicines, foods, dyes, or preservatives  -pregnant or trying to get pregnant  -breast-feeding  How should I use this medicine?  Take  this medicine by mouth with a glass of water. Follow the directions on the prescription label. Take this medicine with food. Do not cut, crush or chew this medicine. Do not suddenly stop taking your medicine because you may develop a severe reaction. If your doctor wants you to stop the medicine, the dose may be slowly lowered over time to avoid any side effects.  Talk to your pediatrician regarding the use of this medicine in children. Special care may be needed.  Overdosage: If you think you have taken too much of this medicine contact a poison control center or emergency room at once.  NOTE: This medicine is only for you. Do not share this medicine with others.  What if I miss a dose?  If you miss a dose, take it as soon as you can. If it is almost time for your next dose, talk to your doctor or health care professional. You may need to miss a dose or take an extra dose. Do not take double or extra doses without advice.  What may interact with this medicine?  Do not take this medicine with any of the following medications:  -metyrapone  -mifepristone  This medicine may also interact with the following medications:  -aminoglutethimide  -amphotericin B  -aspirin and aspirin-like medicines  -barbiturates  -certain medicines for diabetes, like glipizide or glyburide  -cholestyramine  -cholinesterase inhibitors  -cyclosporine  -digoxin  -diuretics  -ephedrine  -female hormones, like estrogens and birth control pills  -isoniazid  -ketoconazole  -NSAIDS, medicines for pain and inflammation, like ibuprofen or naproxen  -phenytoin  -rifampin  -toxoids  -vaccines  -warfarin  This list may not describe all possible interactions. Give your health care provider a list of all the medicines, herbs, non-prescription drugs, or dietary supplements you use. Also tell them if you smoke, drink alcohol, or use illegal drugs. Some items may interact with your medicine.  What should I watch for while using this medicine?  Visit your  doctor or health care professional for regular checks on your progress. If you are taking this medicine over a prolonged period, carry an identification card with your name and address, the type and dose of your medicine, and your doctor's name and address.  This medicine may increase your risk of getting an infection. Tell your doctor or health care professional if you are around anyone with measles or chickenpox, or if you develop sores or blisters that do not heal properly.  If you are going to have surgery, tell your doctor or health care professional that you have taken this medicine within the last twelve months.  Ask your doctor or health care professional about your diet. You may need to lower the amount of salt you eat.  This medicine may affect blood sugar levels. If you have diabetes, check with your doctor or health care professional before you change your diet or the dose of your diabetic medicine.  What side effects may I notice from receiving this medicine?  Side effects that you should report to your doctor or health care professional as soon as possible:  -allergic reactions like skin rash, itching or hives, swelling of the face, lips, or tongue  -changes in emotions or moods  -changes in vision  -depressed mood  -eye pain  -fever or chills, cough, sore throat, pain or difficulty passing urine  -increased thirst  -swelling of ankles, feet  Side effects that usually do not require medical attention (report to your doctor or health care professional if they continue or are bothersome):  -confusion, excitement, restlessness  -headache  -nausea, vomiting  -skin problems, acne, thin and shiny skin  -trouble sleeping  -weight gain  This list may not describe all possible side effects. Call your doctor for medical advice about side effects. You may report side effects to FDA at 9-318-FDA-2725.  Where should I keep my medicine?  Keep out of the reach of children.  Store at room temperature between 15 and 30  degrees C (59 and 86 degrees F). Protect from light and moisture. Keep container tightly closed. Throw away any unused medicine after the expiration date.  NOTE: This sheet is a summary. It may not cover all possible information. If you have questions about this medicine, talk to your doctor, pharmacist, or health care provider.  © 2018 [employer] Standard (2017-01-19 13:41:35)    Tiotropium inhalation powder  What is this medicine?  TIOTROPIUM (farheen oh TRO pee um) is a bronchodilator. It helps open up the airways in your lungs to make it easier to breathe. This medicine is used to treat chronic obstructive pulmonary disease (COPD), including emphysema and chronic bronchitis. Do not use this medicine for an acute attack.  This medicine may be used for other purposes; ask your health care provider or pharmacist if you have questions.  COMMON BRAND NAME(S): Spiriva HandiHaler  What should I tell my health care provider before I take this medicine?  They need to know if you have any of these conditions:  -bladder problems or difficulty passing urine  -glaucoma  -kidney disease  -prostate trouble  -an unusual or allergic reaction to tiotropium, ipratropium, atropine, other medicines, lactose, foods, dyes, or preservatives  -pregnant or trying to get pregnant  -breast-feeding  How should I use this medicine?  This medicine is used in a special inhaler. Do NOT swallow the capsules. Do NOT use a spacer device. Follow the directions on the prescription label. Take your medicine at regular intervals. Do not take it more often than directed. Do not stop taking except on your doctor's advice. Make sure that you are using your inhaler correctly. Ask your doctor or health care provider if you have any questions. Small pieces of the capsule may get in your mouth or throat when you breathe in your medicine. This is normal and should not hurt you.  Talk to your pediatrician regarding the use of this medicine in children. Special  care may be needed.  Overdosage: If you think you have taken too much of this medicine contact a poison control center or emergency room at once.  NOTE: This medicine is only for you. Do not share this medicine with others.  What if I miss a dose?  If you miss a dose, use it as soon as you can. If it is almost time for your next dose, use only that dose and continue with your regular schedule. Do not use double or extra doses.  What may interact with this medicine?  This medicine may also interact with the following medications:  -atropine  -antihistamines for allergy, cough and cold  -certain medicines for bladder problems like oxybutynin, tolterodine  -certain medicines for stomach problems like dicyclomine, hyoscyamine  -certain medicines for travel sickness like scopolamine  -certain medicines for Parkinson's disease like benztropine, trihexyphenidyl  -ipratropium  This list may not describe all possible interactions. Give your health care provider a list of all the medicines, herbs, non-prescription drugs, or dietary supplements you use. Also tell them if you smoke, drink alcohol, or use illegal drugs. Some items may interact with your medicine.  What should I watch for while using this medicine?  Visit your doctor or health care professional for regular checks on your progress. Tell your doctor if your symptoms do not improve. Do not use more medicine than directed. If your symptoms get worse while you are using this medicine, call your doctor right away.  Do not get the this medicine in your eyes. It can cause irritation, pain, or blurred vision.  You may get dizzy. Do not drive, use machinery, or do anything that needs mental alertness until you know how this medicine affects you. Do not stand or sit up quickly, especially if you are an older patient. This reduces the risk of dizzy or fainting spells.  Clean the inhaler as directed in the patient information sheet that comes with this medicine.  What side  effects may I notice from receiving this medicine?  Side effects that you should report to your doctor or health care professional as soon as possible:  -allergic reactions like skin rash or hives, swelling of the face, lips, or tongue  -breathing problems  -changes in vision  -chest pain  -fast heartbeat  -infection or flu-like symptoms  -trouble passing urine or change in the amount of urine  Side effects that usually do not require medical attention (report to your doctor or health care professional if they continue or are bothersome):  -constipation  -cough  -dizziness  -dry mouth  -headache  -muscle pain  -sore throat  -stomach upset  This list may not describe all possible side effects. Call your doctor for medical advice about side effects. You may report side effects to FDA at 4-183-FDA-2592.  Where should I keep my medicine?  Keep out of the reach of children.  Store at room temperature between 15 and 30 degrees C (59 and 86 degrees F). Protect from humidity. Keep capsules in the foil pack until you are ready to use. Throw away any unused medicine after the expiration date.  NOTE: This sheet is a summary. It may not cover all possible information. If you have questions about this medicine, talk to your doctor, pharmacist, or health care provider.  © 2018 [employer] Standard (2015-09-30 13:19:07)    Lisinopril tablets  What is this medicine?  LISINOPRIL (lyse IN oh pril) is an ACE inhibitor. This medicine is used to treat high blood pressure and heart failure. It is also used to protect the heart immediately after a heart attack.  This medicine may be used for other purposes; ask your health care provider or pharmacist if you have questions.  COMMON BRAND NAME(S): Prinivil, Zestril  What should I tell my health care provider before I take this medicine?  They need to know if you have any of these conditions:  -diabetes  -heart or blood vessel disease  -kidney disease  -low blood pressure  -previous  swelling of the tongue, face, or lips with difficulty breathing, difficulty swallowing, hoarseness, or tightening of the throat  -an unusual or allergic reaction to lisinopril, other ACE inhibitors, insect venom, foods, dyes, or preservatives  -pregnant or trying to get pregnant  -breast-feeding  How should I use this medicine?  Take this medicine by mouth with a glass of water. Follow the directions on your prescription label. You may take this medicine with or without food. If it upsets your stomach, take it with food. Take your medicine at regular intervals. Do not take it more often than directed. Do not stop taking except on your doctor's advice.  Talk to your pediatrician regarding the use of this medicine in children. Special care may be needed. While this drug may be prescribed for children as young as 6 years of age for selected conditions, precautions do apply.  Overdosage: If you think you have taken too much of this medicine contact a poison control center or emergency room at once.  NOTE: This medicine is only for you. Do not share this medicine with others.  What if I miss a dose?  If you miss a dose, take it as soon as you can. If it is almost time for your next dose, take only that dose. Do not take double or extra doses.  What may interact with this medicine?  Do not take this medicine with any of the following medications:  -hymenoptera venom  -sacubitril; valsartan  This medicines may also interact with the following medications:  -aliskiren  -angiotensin receptor blockers, like losartan or valsartan  -certain medicines for diabetes  -diuretics  -everolimus  -gold compounds  -lithium  -NSAIDs, medicines for pain and inflammation, like ibuprofen or naproxen  -potassium salts or supplements  -salt substitutes  -sirolimus  -temsirolimus  This list may not describe all possible interactions. Give your health care provider a list of all the medicines, herbs, non-prescription drugs, or dietary  supplements you use. Also tell them if you smoke, drink alcohol, or use illegal drugs. Some items may interact with your medicine.  What should I watch for while using this medicine?  Visit your doctor or health care professional for regular check ups. Check your blood pressure as directed. Ask your doctor what your blood pressure should be, and when you should contact him or her.  Do not treat yourself for coughs, colds, or pain while you are using this medicine without asking your doctor or health care professional for advice. Some ingredients may increase your blood pressure.  Women should inform their doctor if they wish to become pregnant or think they might be pregnant. There is a potential for serious side effects to an unborn child. Talk to your health care professional or pharmacist for more information.  Check with your doctor or health care professional if you get an attack of severe diarrhea, nausea and vomiting, or if you sweat a lot. The loss of too much body fluid can make it dangerous for you to take this medicine.  You may get drowsy or dizzy. Do not drive, use machinery, or do anything that needs mental alertness until you know how this drug affects you. Do not stand or sit up quickly, especially if you are an older patient. This reduces the risk of dizzy or fainting spells. Alcohol can make you more drowsy and dizzy. Avoid alcoholic drinks.  Avoid salt substitutes unless you are told otherwise by your doctor or health care professional.  What side effects may I notice from receiving this medicine?  Side effects that you should report to your doctor or health care professional as soon as possible:  -allergic reactions like skin rash, itching or hives, swelling of the hands, feet, face, lips, throat, or tongue  -breathing problems  -signs and symptoms of kidney injury like trouble passing urine or change in the amount of urine  -signs and symptoms of increased potassium like muscle weakness; chest  pain; or fast, irregular heartbeat  -signs and symptoms of liver injury like dark yellow or brown urine; general ill feeling or flu-like symptoms; light-colored stools; loss of appetite; nausea; right upper belly pain; unusually weak or tired; yellowing of the eyes or skin  -signs and symptoms of low blood pressure like dizziness; feeling faint or lightheaded, falls; unusually weak or tired  -stomach pain with or without nausea and vomiting  Side effects that usually do not require medical attention (report to your doctor or health care professional if they continue or are bothersome):  -changes in taste  -cough  -dizziness  -fever  -headache  -sensitivity to light  This list may not describe all possible side effects. Call your doctor for medical advice about side effects. You may report side effects to FDA at 3-900-FDA-8503.  Where should I keep my medicine?  Keep out of the reach of children.  Store at room temperature between 15 and 30 degrees C (59 and 86 degrees F). Protect from moisture. Keep container tightly closed. Throw away any unused medicine after the expiration date.  NOTE: This sheet is a summary. It may not cover all possible information. If you have questions about this medicine, talk to your doctor, pharmacist, or health care provider.  © 2018 [employer]/Gold Standard (2017-02-06 12:52:35)    Levalbuterol inhalation solution  What is this medicine?  LEVALBUTEROL (lev al BYOO ter ol) is a bronchodilator. It helps open up the airways in your lungs to make it easier to breathe. This medicine is used to treat and to prevent bronchospasm.  This medicine may be used for other purposes; ask your health care provider or pharmacist if you have questions.  COMMON BRAND NAME(S): Xopenex, Xopenex Pediatric  What should I tell my health care provider before I take this medicine?  They need to know if you have any of the following conditions:  -diabetes  -heart disease or irregular heartbeat  -high blood  pressure  -kidney disease  -pheochromocytoma  -seizures  -thyroid disease  -an unusual or allergic reaction to albuterol, levalbuterol, sulfites, other medicines, foods, dyes, or preservatives  -pregnant or trying to get pregnant  -breast-feeding  How should I use this medicine?  This medicine is used in a nebulizer. Nebulizers make a liquid into an aerosol that you breathe in through your mouth or your mouth and nose into your lungs. You will be taught how to use your nebulizer. Follow the directions on your prescription label. Do not use more often than directed.  Do not mix this medicine with other medicines in your nebulizer.  Talk to your pediatrician regarding the use of this medicine in children. Special care may be needed.  Overdosage: If you think you have taken too much of this medicine contact a poison control center or emergency room at once.  NOTE: This medicine is only for you. Do not share this medicine with others.  What if I miss a dose?  If you miss a dose, use it as soon as you can. If it is almost time for your next dose, use only that dose. Do not use double or extra doses.  What may interact with this medicine?  -anti-infectives like chloroquine and pentamidine  -caffeine  -cisapride  -diuretics  -medicines for colds  -medicines for depression or emotional or psychotic conditions  -medicines for weight loss including some herbal products  -methadone  -some antibiotics like clarithromycin, erythromycin, levofloxacin, and linezolid  -some heart medicines  -steroid hormones like dexamethasone, cortisone, hydrocortisone  -theophylline  -thyroid hormones  This list may not describe all possible interactions. Give your health care provider a list of all the medicines, herbs, non-prescription drugs, or dietary supplements you use. Also tell them if you smoke, drink alcohol, or use illegal drugs. Some items may interact with your medicine.  What should I watch for while using this medicine?  Tell your  doctor or health care professional if your symptoms do not improve. Call your doctor right away if your asthma or bronchitis gets worse while you are using this medicine.  If your mouth gets dry try chewing sugarless gum or sucking hard candy. Drink water as directed.  What side effects may I notice from receiving this medicine?  Side effects that you should report to your doctor or health care professional as soon as possible:  -allergic reactions like skin rash or hives, swelling of the face, lips, or tongue  -difficulty breathing or wheezing that increases or does not go away  -dizziness or fainting spell  -high blood pressure  -fever  -muscle cramps or weakness  -numbness in fingers or toes  -vomiting  Side effects that usually do not require medical attention (report to your doctor or health care professional if they continue or are bothersome):  -constipation  -cough  -difficulty sleeping  -headache  -nervousness or trembling  -stuffy or runny nose  -throat irritation  -unusual taste  -upset stomach  This list may not describe all possible side effects. Call your doctor for medical advice about side effects. You may report side effects to FDA at 1-916-FDA-0732.  Where should I keep my medicine?  Keep out of the reach of children.  Store at a room temperature of 68 to 77 degrees. Do not freeze. Protect from light and excessive heat. Keep unopened vials in the foil pouch. Once the foil pouch is opened, the vials should be used within two weeks. Discard any vial if the solution is not clear. Throw away any unused medicine after the expiration date.  NOTE: This sheet is a summary. It may not cover all possible information. If you have questions about this medicine, talk to your doctor, pharmacist, or health care provider.  © 2018 [employer] Standard (2009-11-30 12:16:35)    Guaifenesin oral tablets  What is this medicine?  GUAIFENESIN (gwye FEN e sin) is an expectorant. It helps to thin mucous and make coughs  more productive. This medicine is used to treat coughs caused by colds or the flu. It is not intended to treat chronic cough caused by smoking, asthma, emphysema, or heart failure.  This medicine may be used for other purposes; ask your health care provider or pharmacist if you have questions.  COMMON BRAND NAME(S): Kulwant, Mucinex  What should I tell my health care provider before I take this medicine?  They need to know if you have any of these conditions:  -fever  -kidney disease  -an unusual or allergic reaction to guaifenesin, other medicines, foods, dyes, or preservatives  -pregnant or trying to get pregnant  -breast-feeding  How should I use this medicine?  Take this medicine by mouth with a full glass of water. Follow the directions on the prescription label. Do not break, chew or crush this medicine. You may take with food or on an empty stomach. Take your medicine at regular intervals. Do not take your medicine more often than directed.  Talk to your pediatrician regarding the use of this medicine in children. While this drug may be prescribed for children as young as 12 years old for selected conditions, precautions do apply.  Overdosage: If you think you have taken too much of this medicine contact a poison control center or emergency room at once.  NOTE: This medicine is only for you. Do not share this medicine with others.  What if I miss a dose?  If you miss a dose, take it as soon as you can. If it is almost time for your next dose, take only that dose. Do not take double or extra doses.  What may interact with this medicine?  Interactions are not expected.  This list may not describe all possible interactions. Give your health care provider a list of all the medicines, herbs, non-prescription drugs, or dietary supplements you use. Also tell them if you smoke, drink alcohol, or use illegal drugs. Some items may interact with your medicine.  What should I watch for while using this medicine?  Do not  treat a cough for more than 1 week without consulting your doctor or health care professional. If you also have a high fever, skin rash, continuing headache, or sore throat, see your doctor.  For best results, drink 6 to 8 glasses water daily while you are taking this medicine.  What side effects may I notice from receiving this medicine?  Side effects that you should report to your doctor or health care professional as soon as possible:  -allergic reactions like skin rash, itching or hives, swelling of the face, lips, or tongue  Side effects that usually do not require medical attention (report to your doctor or health care professional if they continue or are bothersome):  -dizziness  -headache  -stomach upset  This list may not describe all possible side effects. Call your doctor for medical advice about side effects. You may report side effects to FDA at 1-709-FDA-4765.  Where should I keep my medicine?  Keep out of the reach of children.  Store at room temperature between 20 and 25 degrees C (68 and 77 degrees F). Keep container tightly closed. Throw away any unused medicine after the expiration date.  NOTE: This sheet is a summary. It may not cover all possible information. If you have questions about this medicine, talk to your doctor, pharmacist, or health care provider.  © 2018 Elsevier/Gold Standard (2009-04-29 12:14:14)    Famotidine tablets or gelcaps  What is this medicine?  FAMOTIDINE (fa MACKENZIE ti jessie) is a type of antihistamine that blocks the release of stomach acid. It is used to treat stomach or intestinal ulcers. It can also relieve heartburn from acid reflux.  This medicine may be used for other purposes; ask your health care provider or pharmacist if you have questions.  COMMON BRAND NAME(S): Heartburn Relief, Pepcid, Pepcid AC, Pepcid AC Maximum Strength  What should I tell my health care provider before I take this medicine?  They need to know if you have any of these conditions:  -kidney or  liver disease  -trouble swallowing  -an unusual or allergic reaction to famotidine, other medicines, foods, dyes, or preservatives  -pregnant or trying to get pregnant  -breast-feeding  How should I use this medicine?  Take this medicine by mouth with a glass of water. Follow the directions on the prescription label. If you only take this medicine once a day, take it at bedtime. Take your doses at regular intervals. Do not take your medicine more often than directed.  Talk to your pediatrician regarding the use of this medicine in children. Special care may be needed.  Overdosage: If you think you have taken too much of this medicine contact a poison control center or emergency room at once.  NOTE: This medicine is only for you. Do not share this medicine with others.  What if I miss a dose?  If you miss a dose, take it as soon as you can. If it is almost time for your next dose, take only that dose. Do not take double or extra doses.  What may interact with this medicine?  -delavirdine  -itraconazole  -ketoconazole  This list may not describe all possible interactions. Give your health care provider a list of all the medicines, herbs, non-prescription drugs, or dietary supplements you use. Also tell them if you smoke, drink alcohol, or use illegal drugs. Some items may interact with your medicine.  What should I watch for while using this medicine?  Tell your doctor or health care professional if your condition does not start to get better or if it gets worse. Finish the full course of tablets prescribed, even if you feel better.  Do not take with aspirin, ibuprofen or other antiinflammatory medicines. These can make your condition worse.  Do not smoke cigarettes or drink alcohol. These cause irritation in your stomach and can increase the time it will take for ulcers to heal.  If you get black, tarry stools or vomit up what looks like coffee grounds, call your doctor or health care professional at once. You may  have a bleeding ulcer.  What side effects may I notice from receiving this medicine?  Side effects that you should report to your doctor or health care professional as soon as possible:  -agitation, nervousness  -confusion  -hallucinations  -skin rash, itching  Side effects that usually do not require medical attention (report to your doctor or health care professional if they continue or are bothersome):  -constipation  -diarrhea  -dizziness  -headache  This list may not describe all possible side effects. Call your doctor for medical advice about side effects. You may report side effects to FDA at 7-643-FDA-0984.  Where should I keep my medicine?  Keep out of the reach of children.  Store at room temperature between 15 and 30 degrees C (59 and 86 degrees F). Do not freeze. Throw away any unused medicine after the expiration date.  NOTE: This sheet is a summary. It may not cover all possible information. If you have questions about this medicine, talk to your doctor, pharmacist, or health care provider.  © 2018 Elsevier/Gold Standard (2014-04-09 14:45:49)    Budesonide inhalation powder  What is this medicine?  BUDESONIDE (bue CHRISTINE oh nide) is a corticosteroid. It helps to decrease inflammation in your lungs. This medicine is used to treat the symptoms of asthma. Never use this medicine for an acute asthma attack.  This medicine may be used for other purposes; ask your health care provider or pharmacist if you have questions.  COMMON BRAND NAME(S): Pulmicort  What should I tell my health care provider before I take this medicine?  They need to know if you have any of these conditions:  -bone problems  -glaucoma  -immune system problems  -infection, like chickenpox, tuberculosis, herpes, or fungal infection  -recent surgery or injury of mouth or throat  -taking corticosteroids by mouth  -an unusual or allergic reaction to budesonide, steroids, other medicines, foods, dyes, or preservatives  -pregnant or trying to get  pregnant  -breast-feeding  How should I use this medicine?  This medicine is inhaled through the mouth. Rinse your mouth with water after use. Make sure not to swallow the water. Follow the directions on your prescription label. Do not use more often than directed. Make sure that you are using your inhaler correctly. Ask you doctor or health care provider if you have any questions.  Talk to your pediatrician regarding the use of this medicine in children. Special care may be needed. While this drug may be prescribed for children as young as 6 years of age for selected conditions, precautions do apply.  Overdosage: If you think you have taken too much of this medicine contact a poison control center or emergency room at once.  NOTE: This medicine is only for you. Do not share this medicine with others.  What if I miss a dose?  If you miss a dose, use it as soon as you remember. If it is almost time for your next dose, use only that dose and continue with your regular schedule. Do not use double or extra doses.  What may interact with this medicine?  Do not take this medicine with any of the following medications:  -mifepristone  This medicine may also interact with the following medications:  -cimetidine  -clarithromycin  -erythromycin  -ketoconazole  -grapefruit juice  -itraconazole  -some vaccinations  This list may not describe all possible interactions. Give your health care provider a list of all the medicines, herbs, non-prescription drugs, or dietary supplements you use. Also tell them if you smoke, drink alcohol, or use illegal drugs. Some items may interact with your medicine.  What should I watch for while using this medicine?  Visit your doctor or health care professional for regular checks on your progress. Check with your doctor if your symptoms do not improve. If your symptoms get worse or if you need your short-acting inhalers more often, call your doctor right away. Do not stop taking your medicine  unless your doctor tells you to.  This medicine may increase your risk of getting an infection. Stay away from people who are sick. Tell your doctor or health care professional if you are around anyone with measles or chickenpox.  What side effects may I notice from receiving this medicine?  Side effects that you should report to your doctor or health care professional as soon as possible:  -allergic reactions like skin rash, itching or hives, swelling of the face, lips, or tongue  -breathing problems  -changes in vision  -white patches or sores in the mouth or throat  -unusual swelling  Side effects that usually do not require medical attention (report to your doctor or health care professional if they continue or are bothersome):  -coughing, hoarseness  -dry mouth  -loss of taste, or unpleasant taste  -stomach upset  This list may not describe all possible side effects. Call your doctor for medical advice about side effects. You may report side effects to FDA at 1-825-FDA-2779.  Where should I keep my medicine?  Keep out of the reach of children.  Store in a dry place at room temperature between 20 and 25 degrees C (68 and 77 degrees F). Do not get the inhaler wet. Check if your inhaler has a way to show you if the doses are all gone. Throw away your inhaler when it is empty. Do not reuse this inhaler. Throw away any unused medicine after the expiration date.  NOTE: This sheet is a summary. It may not cover all possible information. If you have questions about this medicine, talk to your doctor, pharmacist, or health care provider.  © 2018 Elsevier/Gold Standard (2014-06-05 11:09:24)    Amlodipine tablets  What is this medicine?  AMLODIPINE (am ALDA di peen) is a calcium-channel blocker. It affects the amount of calcium found in your heart and muscle cells. This relaxes your blood vessels, which can reduce the amount of work the heart has to do. This medicine is used to lower high blood pressure. It is also used to  prevent chest pain.  This medicine may be used for other purposes; ask your health care provider or pharmacist if you have questions.  COMMON BRAND NAME(S): Norvasc  What should I tell my health care provider before I take this medicine?  They need to know if you have any of these conditions:  -heart problems like heart failure or aortic stenosis  -liver disease  -an unusual or allergic reaction to amlodipine, other medicines, foods, dyes, or preservatives  -pregnant or trying to get pregnant  -breast-feeding  How should I use this medicine?  Take this medicine by mouth with a glass of water. Follow the directions on the prescription label. Take your medicine at regular intervals. Do not take more medicine than directed.  Talk to your pediatrician regarding the use of this medicine in children. Special care may be needed. This medicine has been used in children as young as 6.  Persons over 65 years old may have a stronger reaction to this medicine and need smaller doses.  Overdosage: If you think you have taken too much of this medicine contact a poison control center or emergency room at once.  NOTE: This medicine is only for you. Do not share this medicine with others.  What if I miss a dose?  If you miss a dose, take it as soon as you can. If it is almost time for your next dose, take only that dose. Do not take double or extra doses.  What may interact with this medicine?  -herbal or dietary supplements  -local or general anesthetics  -medicines for high blood pressure  -medicines for prostate problems  -rifampin  This list may not describe all possible interactions. Give your health care provider a list of all the medicines, herbs, non-prescription drugs, or dietary supplements you use. Also tell them if you smoke, drink alcohol, or use illegal drugs. Some items may interact with your medicine.  What should I watch for while using this medicine?  Visit your doctor or health care professional for regular check  ups. Check your blood pressure and pulse rate regularly. Ask your health care professional what your blood pressure and pulse rate should be, and when you should contact him or her.  This medicine may make you feel confused, dizzy or lightheaded. Do not drive, use machinery, or do anything that needs mental alertness until you know how this medicine affects you. To reduce the risk of dizzy or fainting spells, do not sit or stand up quickly, especially if you are an older patient. Avoid alcoholic drinks; they can make you more dizzy.  Do not suddenly stop taking amlodipine. Ask your doctor or health care professional how you can gradually reduce the dose.  What side effects may I notice from receiving this medicine?  Side effects that you should report to your doctor or health care professional as soon as possible:  -allergic reactions like skin rash, itching or hives, swelling of the face, lips, or tongue  -breathing problems  -changes in vision or hearing  -chest pain  -fast, irregular heartbeat  -swelling of legs or ankles  Side effects that usually do not require medical attention (report to your doctor or health care professional if they continue or are bothersome):  -dry mouth  -facial flushing  -nausea, vomiting  -stomach gas, pain  -tired, weak  -trouble sleeping  This list may not describe all possible side effects. Call your doctor for medical advice about side effects. You may report side effects to FDA at 1-435-FDA-5286.  Where should I keep my medicine?  Keep out of the reach of children.  Store at room temperature between 59 and 86 degrees F (15 and 30 degrees C). Protect from light. Keep container tightly closed. Throw away any unused medicine after the expiration date.  NOTE: This sheet is a summary. It may not cover all possible information. If you have questions about this medicine, talk to your doctor, pharmacist, or health care provider.  © 2018 Elsevier/Gold Standard (2013-11-15  11:40:58)      Depression / Suicide Risk    As you are discharged from this [facility] facility, it is important to learn how to keep safe from harming yourself.    Recognize the warning signs:  · Abrupt changes in personality, positive or negative- including increase in energy   · Giving away possessions  · Change in eating patterns- significant weight changes-  positive or negative  · Change in sleeping patterns- unable to sleep or sleeping all the time   · Unwillingness or inability to communicate  · Depression  · Unusual sadness, discouragement and loneliness  · Talk of wanting to die  · Neglect of personal appearance   · Rebelliousness- reckless behavior  · Withdrawal from people/activities they love  · Confusion- inability to concentrate     If you or a loved one observes any of these behaviors or has concerns about self-harm, here's what you can do:  · Talk about it- your feelings and reasons for harming yourself  · Remove any means that you might use to hurt yourself (examples: pills, rope, extension cords, firearm)  · Get professional help from the community (Mental Health, Substance Abuse, psychological counseling)  · Do not be alone:Call your Safe Contact- someone whom you trust who will be there for you.  · Call your local CRISIS HOTLINE 551-4270 or 915-954-7522  · Call your local Children's Mobile Crisis Response Team [place] (949) 615-2061 or www.[employer]  · Call the toll free National Suicide Prevention Hotlines   · National Suicide Prevention Lifeline 581-872-CZRD (1294)  · National Hope Line Network 800-SUICIDE (841-0162)

## 2019-07-25 NOTE — PROGRESS NOTES
1900: Received report from Aleah DARNELL. PT is sitting up in bed, calm with unlabored breathing. Denies any pain or other needs at this time.   1930: Assessment completed. Pt requesting oxycodone for pain, discussed that it is PRN every 6 hours, last dose was at 1745, heat packs offered.   2345: requesting prn oxycodone, medicated per mar.   0200: sleeping in bed, calm with unlabored breathing. NC on at 1 L/ min.   0600: Pt had no issues over night, slept well. Prn oxycodone given per pt request for 7/10 pain to back and neck. Voices no questions or concerns.   0700: Report given to Vera DARNELL.

## 2019-07-25 NOTE — DISCHARGE PLANNING
Received Choice form at 7009  Agency/Facility Name: [facility]  Referral sent per Choice form @ 6021

## 2019-07-25 NOTE — DISCHARGE SUMMARY
"Discharge Summary    CHIEF COMPLAINT ON ADMISSION  Chief Complaint   Patient presents with   • Shortness of Breath     x 7 hours treated for pneumonia and bronchitis 2 weeks ago   • Chest Pain     burning.  \"It hurts to breathe\"       Reason for Admission  Shortness of Breath      Admission Date  7/20/2019    CODE STATUS  DNAR/DNI    HPI & HOSPITAL COURSE  This is a 64 y.o. female here with     History of tobacco, alcohol use, anxiety admitted for shortness of breath found to have COPD exacerbation and likely pneumonia. She is hypoxic requiring oxygen and is not on oxygen at home. She has chronic pain and anxiety.    She was treated with iv steroids and doxycycline. Blood cultures remained negative for growth. She was treated with xopenex due to worsening shakiness and anxiety on albuterol. Her symptoms slowly improved and oxygen level improved to be over 88% on room air at rest and with ambulation. The patient explained a plan that she and her  have to stop smoking. She was switched to oral steroids which she tolerated well.  She was noted to have elevated blood pressure and was started on linsinopril and norvasc as she had a history of elevated blood pressure but had not been taking medication for it for at least 8 years and does not see a physician.    Therefore, she is discharged in fair and stable condition to home with close outpatient follow-up.    The patient met 2-midnight criteria for an inpatient stay at the time of discharge.    Discharge Date  7/25/2019    FOLLOW UP ITEMS POST DISCHARGE  Pulmonary function tests  Blood pressure follow up with primary care provider    DISCHARGE DIAGNOSES  Principal Problem:    Acute exacerbation of chronic obstructive pulmonary disease (COPD) ([facility]) POA: Yes  Active Problems:    Tobacco abuse POA: Yes    Anxiety POA: Yes    Alcohol abuse POA: Yes    Hypokalemia POA: Yes    Essential hypertension POA: Yes  Resolved Problems:    Acute respiratory failure with " hypoxemia (HCC) POA: Yes    Hypernatremia POA: Yes      FOLLOW UP  No future appointments.  Germaine Schrader M.D.  [address] [address] [phone]    In 1 month  for pulmonary function test referral      MEDICATIONS ON DISCHARGE     Medication List      START taking these medications      Instructions   amLODIPine 5 MG Tabs  Start taking on:  7/26/2019  Commonly known as:  NORVASC   Take 1 Tab by mouth every day.  Dose:  5 mg     budesonide-formoterol 160-4.5 MCG/ACT Aero  Commonly known as:  SYMBICORT   Inhale 2 Puffs by mouth 2 Times a Day.  Dose:  2 Puff     famotidine 20 MG Tabs  Commonly known as:  PEPCID   Take 1 Tab by mouth 2 Times a Day.  Dose:  20 mg     Guaifenesin 1200 MG Tb12   Take 1 Tab by mouth every 12 hours.  Dose:  1200 mg     lisinopril 10 MG Tabs  Start taking on:  7/26/2019  Commonly known as:  PRINIVIL   Take 1 Tab by mouth every day.  Dose:  10 mg     predniSONE 10 MG Tabs  Start taking on:  7/26/2019  Commonly known as:  DELTASONE   Take 40mg po daily for 5 days, then 30mg daily for 5 days, then 20mg daily for 5 days, then 10mg daily for 5 days, then stop.     tiotropium 18 MCG Caps  Commonly known as:  SPIRIVA   Inhale 1 Cap by mouth every day.  Dose:  18 mcg        CONTINUE taking these medications      Instructions   albuterol 108 (90 Base) MCG/ACT Aers inhalation aerosol   Inhale 2 Puffs by mouth every 6 hours as needed for Shortness of Breath.  Dose:  2 Puff     aspirin 81 MG tablet   Take 81 mg by mouth every day.  Dose:  81 mg     cyclobenzaprine 10 MG Tabs  Commonly known as:  FLEXERIL   Take 10 mg by mouth 3 times a day.  Dose:  10 mg     gabapentin 300 MG Caps  Commonly known as:  NEURONTIN   Take 300 mg by mouth 3 times a day.  Dose:  300 mg     oxyCODONE HCl 15 MG Taba   Take 15 mg by mouth 4 times a day as needed.  Dose:  15 mg            Allergies  Allergies   Allergen Reactions   • Iodine Hives and Shortness of Breath     IVP dye   • Tape Itching      Peels skin off  Paper tape ok, if it is not on to long.       DIET  Orders Placed This Encounter   Procedures   • Diet Order Regular     Standing Status:   Standing     Number of Occurrences:   1     Order Specific Question:   Diet:     Answer:   Regular [1]       ACTIVITY  As tolerated.  Weight bearing as tolerated    CONSULTATIONS  none    PROCEDURES  none    LABORATORY  Lab Results   Component Value Date    SODIUM 140 07/24/2019    POTASSIUM 3.3 (L) 07/24/2019    CHLORIDE 107 07/24/2019    CO2 24 07/24/2019    GLUCOSE 146 (H) 07/24/2019    BUN 19 07/24/2019    CREATININE 0.69 07/24/2019    CREATININE 0.9 11/23/2008        Lab Results   Component Value Date    WBC 6.7 07/24/2019    HEMOGLOBIN 10.5 (L) 07/24/2019    HEMATOCRIT 32.8 (L) 07/24/2019    PLATELETCT 184 07/24/2019        Total time of the discharge process exceeds 56 minutes.

## 2019-07-25 NOTE — FLOWSHEET NOTE
07/25/19 0840   Events/Summary/Plan   Events/Summary/Plan Placed pt on RA resting on bed sats from 97% to 94%. Tx given    Interdisciplinary Plan of Care-Goals (Indications)   Bronchodilator Indications History / Diagnosis   Interdisciplinary Plan of Care-Outcomes    Bronchodilator Outcome Patient at Stable Baseline   Education   Education Yes - Pt. / Family has been Instructed in use of Respiratory Medications and Adverse Reactions   RT Assessment of Delivered Medications   Evaluation of Medication Delivery Daily Yes-- Pt /Family has been Instructed in use of Respiratory Medications and Adverse Reactions   SVN Group   #SVN Performed Yes   Given By: Mouthpiece   MDI/DPI Group   #MDI/DPI Given MDI/DPI x 2   Respiratory WDL   Respiratory (WDL) X   Chest Exam   Respiration 18   Pulse 69   Breath Sounds   Pre/Post Intervention Post Intervention Assessment   RUL Breath Sounds Clear;Expiratory Wheezes   RML Breath Sounds Clear;Expiratory Wheezes   RLL Breath Sounds Diminished   RODY Breath Sounds Clear;Expiratory Wheezes   LLL Breath Sounds Diminished   Oximetry   #Pulse Oximetry (Single Determination) Yes   Oxygen   Pulse Oximetry 94 %   O2 (LPM) 0   O2 Daily Delivery Respiratory  Room Air with O2 Available

## 2019-07-29 ENCOUNTER — PATIENT OUTREACH (OUTPATIENT)
Dept: HEALTH INFORMATION MANAGEMENT | Facility: OTHER | Age: 64
End: 2019-07-29

## 2019-07-30 PROCEDURE — 3E0234Z INTRODUCTION OF SERUM, TOXOID AND VACCINE INTO MUSCLE, PERCUTANEOUS APPROACH: ICD-10-PCS | Performed by: INTERNAL MEDICINE

## 2019-08-01 ENCOUNTER — TELEPHONE (OUTPATIENT)
Dept: MEDICAL GROUP | Facility: MEDICAL CENTER | Age: 64
End: 2019-08-01

## 2019-08-01 ENCOUNTER — OFFICE VISIT (OUTPATIENT)
Dept: MEDICAL GROUP | Facility: MEDICAL CENTER | Age: 64
End: 2019-08-01
Payer: MEDICARE

## 2019-08-01 VITALS
SYSTOLIC BLOOD PRESSURE: 134 MMHG | OXYGEN SATURATION: 95 % | HEIGHT: 58 IN | BODY MASS INDEX: 23.09 KG/M2 | RESPIRATION RATE: 16 BRPM | TEMPERATURE: 98.6 F | WEIGHT: 110 LBS | HEART RATE: 90 BPM | DIASTOLIC BLOOD PRESSURE: 74 MMHG

## 2019-08-01 DIAGNOSIS — R73.09 ELEVATED GLUCOSE: ICD-10-CM

## 2019-08-01 DIAGNOSIS — J44.1 ACUTE EXACERBATION OF CHRONIC OBSTRUCTIVE PULMONARY DISEASE (COPD) (HCC): ICD-10-CM

## 2019-08-01 DIAGNOSIS — E83.51 HYPOCALCEMIA: ICD-10-CM

## 2019-08-01 DIAGNOSIS — Z09 HOSPITAL DISCHARGE FOLLOW-UP: ICD-10-CM

## 2019-08-01 DIAGNOSIS — E87.6 HYPOKALEMIA: ICD-10-CM

## 2019-08-01 DIAGNOSIS — I10 ESSENTIAL HYPERTENSION: ICD-10-CM

## 2019-08-01 DIAGNOSIS — F17.200 CURRENT SMOKER: ICD-10-CM

## 2019-08-01 PROCEDURE — 99495 TRANSJ CARE MGMT MOD F2F 14D: CPT | Performed by: FAMILY MEDICINE

## 2019-08-01 ASSESSMENT — PATIENT HEALTH QUESTIONNAIRE - PHQ9: CLINICAL INTERPRETATION OF PHQ2 SCORE: 0

## 2019-08-01 NOTE — PATIENT INSTRUCTIONS
If you need further assistance, or have any questions; concerns or lingering symptoms before seeing your Primary Care Provider or specialist.     Do not hesitate to contact us.     Please contact us at the Post-Hospital Follow Up Program at [phone] and ask for the Medical Assistant for Dr Marte.   Our offices hours are Monday-Friday 8 am-5 pm.

## 2019-08-01 NOTE — PROGRESS NOTES
Subjective:     Antonieta Mcdonald is a 64 y.o. female who presents for Hospital Follow-up.  Chart and discharge summary reviewed.   Date of discharge 7/25/2019.  48- hour post discharge RN call completed on 7/29/2019 and documented in the medical record by Flavia Alejo RN:   POST DISCHARGE CALL:  Discharge Date:7/25/2019   Date of Outreach Call: 7/29/2019  1:22 PM  Now that you're home, how are you doing? Good  Comment:Pt reports she is feeling better. Denies feeling  short of breath.  Do you have questions about your medications? Yes  Comment:Pt states her inhaler medication wasn't covered  and she will need PCP to do a prior auth    Did you fill your medications? Yes    Do you have a follow-up appointment scheduled?Yes  Comment:Post discharge clinic 8/1    Discharging Department: [facility]    Number of Attempts: 1  Current or previous attempts completed within two business days of discharge? Yes  Provided education regarding treatment plan, medication, self-management, ADLs? Yes  Has patient completed Advance Directive? If yes, advise them to bring to appointment. No  Care Manager phone number provided? Yes  Is there anything else I can help you with? No        HPI: The patient is a 64-year-old white female longtime smoker who was recently hospitalized for shortness of breath.  She was treated for COPD exacerbation and likely pneumonia.  She required oxygen during her hospitalization.  She was treated with IV steroids and doxycycline.  She was treated with Xopenex after it was observed that she had worsening shakiness and anxiety on albuterol.  She was discharged on a tapering dose of prednisone along with albuterol inhaler, Xopenex nebulizer treatment, Symbicort, and Spiriva.  However she has not been able to obtain the Xopenex medication or the Symbicort and Spiriva.  She was told by the pharmacist that her insurance required prior authorization.  Labs were notable for hypokalemia,  hypocalcemia, and hyperglycemia.  Mild anemia showed up after IV fluids so this was a probably dilutional effect.    She was also diagnosed with hypertension and started on lisinopril and amlodipine.  She has been monitoring her blood pressure which has improved.    Patient also has a history of low back pain and is treated by a pain specialist with oxycodone and gabapentin.    Since returning home, patient reports feeling better but notes some side effects to the prednisone including feeling more jittery and alternating between feeling hyperactive and tired.     The patient has questions regarding hospitalization or discharge: All of her questions were answered.  The patient denies weakness; denies difficulty taking care of self at home.  Patient reports taking medications as instructed except for the prescriptions that she was unable to fill.    Current Outpatient Medications   Medication Sig Dispense Refill   • budesonide-formoterol (SYMBICORT) 160-4.5 MCG/ACT Aerosol Inhale 2 Puffs by mouth 2 Times a Day. (Patient not taking: Reported on 8/1/2019) 1 Inhaler 2   • tiotropium (SPIRIVA) 18 MCG Cap Inhale 1 Cap by mouth every day. (Patient not taking: Reported on 8/1/2019) 30 Cap 3   • lisinopril (PRINIVIL) 10 MG Tab Take 1 Tab by mouth every day. 30 Tab 2   • amLODIPine (NORVASC) 5 MG Tab Take 1 Tab by mouth every day. 30 Tab 2   • predniSONE (DELTASONE) 10 MG Tab Take 40mg po daily for 5 days, then 30mg daily for 5 days, then 20mg daily for 5 days, then 10mg daily for 5 days, then stop. 50 Tab 0   • famotidine (PEPCID) 20 MG Tab Take 1 Tab by mouth 2 Times a Day. 60 Tab 0   • levalbuterol (XOPENEX) 1.25 MG/3ML Nebu Soln 3 mL by Nebulization route 4 times a day. (Patient not taking: Reported on 8/1/2019) 24 mL 3   • albuterol 108 (90 Base) MCG/ACT Aero Soln inhalation aerosol Inhale 2 Puffs by mouth every 6 hours as needed for Shortness of Breath.     • aspirin 81 MG tablet Take 81 mg by mouth every day.     •  "gabapentin (NEURONTIN) 300 MG Cap Take 300 mg by mouth 3 times a day.       No current facility-administered medications for this visit.        Allergies:   Iodine and Tape    Social History:  Social History     Tobacco Use   • Smoking status: Current Some Day Smoker     Packs/day: 0.50     Years: 32.00     Pack years: 16.00     Types: Cigarettes     Start date: 1/1/1978   • Smokeless tobacco: Never Used   • Tobacco comment: 1 ppd times 30yrs   Substance Use Topics   • Alcohol use: No        Family History:  History reviewed. No pertinent family history.    Past Medical History:   Diagnosis Date   • Alcohol abuse 7/20/2019   • Angina     occasional, had cardiac workup \"ait was fine\"   • Arthritis     rheumatoid; hands & R shoulder   • ASTHMA     \"not an issue since pneumonia in 1995\"   • Back pain    • Bowel habit changes     constipation   • Bronchitis 1995   • Cancer (HCC)     colon CA 1977   • Cataract     surgical repair bilateral   • COPD     asthma, denies COPD   • Dental disorder     upper partial denture   • Fall     occasionally due to macular degeneration   • Heart burn    • Heart murmur     as child   • Hepatitis A 1980's   • Hiatus hernia syndrome    • Hypertension     weight loss, taken off medication approx 2014   • Macular degeneration    • Other specified symptom associated with female genital organs     hysterectomy   • Pain     mid back, low back   • PAIN DISORDER    • Panic attack    • Personal history of venous thrombosis and embolism     1970   • Pneumonia 1995   • Psychiatric problem     panic attacks   • Snoring     sleep study done approx 1998, no treatment recommended       Surgical History  Past Surgical History:   Procedure Laterality Date   • GASTROSCOPY-ENDO N/A 4/8/2016    Procedure: GASTROSCOPY-ENDO W/POSS BIOPSY, DILATION, PANCREAS POLYPECTOMY AND CONTROL OF HEMORRHAGE;  Surgeon: Nathan Glroia M.D.;  Location: SURGERY [facility];  Service:    • EGD W/ENDOSCOPIC ULTRASOUND N/A " "4/8/2016    Procedure: EGD W/ENDOSCOPIC ULTRASOUND;  Surgeon: Nathan Gloria M.D.;  Location: SURGERY [facility];  Service:    • EGD WITH ASP/BX N/A 4/8/2016    Procedure: EGD WITH ASP/BX - FNA;  Surgeon: Nathan Gloria M.D.;  Location: SURGERY [facility];  Service:    • CATARACT EXTRACTION WITH IOL  2013   • COLON RESECTION  1983   • LAPAROSCOPY  1980's   • LAPAROTOMY  1977    bilateral salpingo-oophorectomy   • HYSTERECTOMY, TOTAL ABDOMINAL  1975   • TONSILLECTOMY  1957   • EYE SURGERY Bilateral 1950's    muscle repair   • PRIMARY C SECTION  1972, 1973       ROS:    Constitutional:  Denies fever, chills, night sweats  HENT: Denies head congestion, ear pain or drainage, decreased hearing, sore throat  Eyes: Denies eye drainage or redness, eye pain.  She mentions that her eyes have felt foggy since starting the prednisone and she has had a recent eye exam.  Neck: Denies pain, swollen glands, decreased range of motion  Lungs: Denies shortness of breath, wheezing, cough  Cardiovascular: Denies chest pain, orthopnea, lower extremity edema, palpitations  Abdominal: Denies abdominal pain, change in bowel or bladder habits, nausea or vomiting  Musculoskeletal: Chronic low back pain as mentioned above.  Endocrine: Denies unexplained weight changes, heat or cold intolerance, hair loss, polyuria or polydipsia  Neurological: Denies dizziness, headache, confusion, focal weakness or numbness, memory loss  Psychiatric: Notes some shakiness and insomnia from the prednisone.       Objective:     /74 (BP Location: Left arm, Patient Position: Sitting, BP Cuff Size: Adult)   Pulse 90   Temp 37 °C (98.6 °F) (Temporal)   Resp 16   Ht 1.473 m (4' 10\")   Wt 49.9 kg (110 lb)   SpO2 95%      Physical Exam:    GEN:  Alert, oriented, in no distress.  She is slightly shaky.  HEENT:  PERRLA, EOMI  NECK;  Supple without cervical adenopathy or thyromegaly  LUNGS:  Clear to auscultation without rales, rhonci, or " wheezes.  CV:  Heart RRR without murmurs or S3 or S4  EXT:  Without cyanosis, clubbing, or edema.  NEURO:  Cranial nerves II through XII intact.  Motor function and sensation intact.  SKIN: No rashes or suspicious lesions.  PSYCH:  Behavior is appropriate.      Assessment and Plan:     1. Hospital follow-up:   Hospitalization and results reviewed with patient. High risk conditions requiring teaching or care coordination were identified and addressed.The patient demonstrate understanding of admission and underlying conditions. The patient understands discharge instructions and when to seek medical attention. Medications reviewed including instructions regarding high risk medications, dosing and side effects.    The patient is able to safely adhere to ADL/IADL, treatment and medication regimen, self-manage of high-risk conditions? Yes  The patient requires physical therapy/home health/DME referral? No   The patient requires referral to care coordination/behavioral health/social work?  No   Patient requires referral for pharmacy consult? No   Advance directive/POLST on file?  No   Required counseling on advance directive? Deferred    2. Acute exacerbation of chronic obstructive pulmonary disease (COPD) (HCC)  -She will need PFTs to establish a formal diagnosis after she establishes care with a new PCP.  -We are working to get the preauthorizations for Spiriva, Symbicort, Xopenex nebulizer medication.  She will continue her tapering dose of prednisone and albuterol inhaler as needed.    3. Essential hypertension  -Improved control with lisinopril and amlodipine.  She will continue to monitor her blood pressure.  - Basic Metabolic Panel; Future    4. Current smoker  -She says she smokes about 3/4 pack of cigarettes a day.  Both she and her  smoke.  They are given counseling regarding the need to quit smoking.  They have a desire to quit but the motivation is difficult.  - REFERRAL TO TOBACCO CESSATION  PROGRAM    5. Hypokalemia  -Repeat basic Metabolic Panel; Future    6. Elevated glucose  -Most likely secondary to prednisone  - Basic Metabolic Panel; Future    7. Hypocalcemia  -Repeat basic Metabolic Panel; Future        Medication Reconciliation  Medication list at end of encounter:   Current Outpatient Medications   Medication Sig Dispense Refill   • budesonide-formoterol (SYMBICORT) 160-4.5 MCG/ACT Aerosol Inhale 2 Puffs by mouth 2 Times a Day. (Patient not taking: Reported on 8/1/2019) 1 Inhaler 2   • tiotropium (SPIRIVA) 18 MCG Cap Inhale 1 Cap by mouth every day. (Patient not taking: Reported on 8/1/2019) 30 Cap 3   • lisinopril (PRINIVIL) 10 MG Tab Take 1 Tab by mouth every day. 30 Tab 2   • amLODIPine (NORVASC) 5 MG Tab Take 1 Tab by mouth every day. 30 Tab 2   • predniSONE (DELTASONE) 10 MG Tab Take 40mg po daily for 5 days, then 30mg daily for 5 days, then 20mg daily for 5 days, then 10mg daily for 5 days, then stop. 50 Tab 0   • famotidine (PEPCID) 20 MG Tab Take 1 Tab by mouth 2 Times a Day. 60 Tab 0   • levalbuterol (XOPENEX) 1.25 MG/3ML Nebu Soln 3 mL by Nebulization route 4 times a day. (Patient not taking: Reported on 8/1/2019) 24 mL 3   • albuterol 108 (90 Base) MCG/ACT Aero Soln inhalation aerosol Inhale 2 Puffs by mouth every 6 hours as needed for Shortness of Breath.     • aspirin 81 MG tablet Take 81 mg by mouth every day.     • gabapentin (NEURONTIN) 300 MG Cap Take 300 mg by mouth 3 times a day.       No current facility-administered medications for this visit.        Primary care follow-up:    Recommended followup:  with new PCP Germaine Schrader M.D.   Future Appointments       Provider Department Center    9/10/2019 1:00 PM Germaine Schrader M.D. [facility] [place]           Patient Instruction  Patient provided with educational material on discharge diagnosis and management of symptoms/red flags. Patient instructed to keep follow-up appointments and to bring written  questions and and actual medications to each office visit. Patient instructed to call PCP/specialist with any problems/questions/concerns. Patient verbalizes understanding and has no further questions at this time.    Face-to-face transitional care management services with moderate medical decision complexity were provided.

## 2019-08-01 NOTE — TELEPHONE ENCOUNTER
PA submission for both Symbicort (KEY: [id number]) and Spiriva (KEY: [id number]) were submitted via covermymeds with estimate response time of about 72 hours. Therefore, we likely won't have a response until Monday, 8/5.  Pt ID: [id number]  RxBIN: [id number]  RxPCN: [id number]    The PA for symbicort gave the following preferred alternatives, and because patient's chart does not indicate that pt has tried any of these, an alternative will likely need to be sent in:   Anoro Ellipta, Breo Ellipta, Incruse Ellipta, Striverdi Respimat        Pt was supposed to receive a neb machine on 7/26 but per pt, CHRISTOPHER Perez stated that pt needed to have medication first. Pharmacy was given the neb machine order date for the xopenex /levalbuterol and will submit info on pharmacy claim.

## 2019-09-10 ENCOUNTER — OFFICE VISIT (OUTPATIENT)
Dept: MEDICAL GROUP | Facility: LAB | Age: 64
End: 2019-09-10
Payer: MEDICARE

## 2019-09-10 VITALS
OXYGEN SATURATION: 98 % | DIASTOLIC BLOOD PRESSURE: 90 MMHG | BODY MASS INDEX: 22.63 KG/M2 | HEART RATE: 106 BPM | HEIGHT: 58 IN | WEIGHT: 107.8 LBS | TEMPERATURE: 98.7 F | SYSTOLIC BLOOD PRESSURE: 142 MMHG

## 2019-09-10 DIAGNOSIS — J44.9 CHRONIC OBSTRUCTIVE PULMONARY DISEASE, UNSPECIFIED COPD TYPE (HCC): ICD-10-CM

## 2019-09-10 DIAGNOSIS — F17.200 CURRENT SMOKER: ICD-10-CM

## 2019-09-10 DIAGNOSIS — M81.0 AGE-RELATED OSTEOPOROSIS WITHOUT CURRENT PATHOLOGICAL FRACTURE: ICD-10-CM

## 2019-09-10 DIAGNOSIS — Z23 NEED FOR VACCINATION: ICD-10-CM

## 2019-09-10 DIAGNOSIS — R79.9 ABNORMAL FINDING OF BLOOD CHEMISTRY: ICD-10-CM

## 2019-09-10 DIAGNOSIS — Z12.39 BREAST CANCER SCREENING: ICD-10-CM

## 2019-09-10 DIAGNOSIS — Z11.59 NEED FOR HEPATITIS C SCREENING TEST: ICD-10-CM

## 2019-09-10 DIAGNOSIS — K86.1 CHRONIC PANCREATITIS, UNSPECIFIED PANCREATITIS TYPE (HCC): ICD-10-CM

## 2019-09-10 PROBLEM — F10.10 ALCOHOL ABUSE: Status: RESOLVED | Noted: 2019-07-20 | Resolved: 2019-09-10

## 2019-09-10 PROCEDURE — G0008 ADMIN INFLUENZA VIRUS VAC: HCPCS | Performed by: FAMILY MEDICINE

## 2019-09-10 PROCEDURE — 90472 IMMUNIZATION ADMIN EACH ADD: CPT | Performed by: FAMILY MEDICINE

## 2019-09-10 PROCEDURE — 99204 OFFICE O/P NEW MOD 45 MIN: CPT | Mod: 25 | Performed by: FAMILY MEDICINE

## 2019-09-10 PROCEDURE — 90662 IIV NO PRSV INCREASED AG IM: CPT | Performed by: FAMILY MEDICINE

## 2019-09-10 PROCEDURE — 90715 TDAP VACCINE 7 YRS/> IM: CPT | Performed by: FAMILY MEDICINE

## 2019-09-10 RX ORDER — NICOTINE 21 MG/24HR
1 PATCH, TRANSDERMAL 24 HOURS TRANSDERMAL EVERY 24 HOURS
Qty: 30 PATCH | Refills: 0 | Status: SHIPPED | OUTPATIENT
Start: 2019-09-10 | End: 2024-02-22

## 2019-09-10 RX ORDER — LISINOPRIL 10 MG/1
20 TABLET ORAL 2 TIMES DAILY
Qty: 180 TAB | Refills: 3 | Status: SHIPPED | OUTPATIENT
Start: 2019-09-10 | End: 2019-09-17 | Stop reason: SDUPTHER

## 2019-09-10 RX ORDER — ALBUTEROL SULFATE 90 UG/1
2 AEROSOL, METERED RESPIRATORY (INHALATION) EVERY 6 HOURS PRN
Qty: 8.5 G | Refills: 3 | Status: SHIPPED | OUTPATIENT
Start: 2019-09-10 | End: 2024-02-22

## 2019-09-10 RX ORDER — OXYCODONE HYDROCHLORIDE 15 MG/1
15 TABLET ORAL EVERY 4 HOURS PRN
COMMUNITY
End: 2024-02-22

## 2019-09-10 SDOH — HEALTH STABILITY: MENTAL HEALTH: HOW OFTEN DO YOU HAVE A DRINK CONTAINING ALCOHOL?: 2-4 TIMES A MONTH

## 2019-09-10 NOTE — PROGRESS NOTES
"Subjective:     CC: [facility]    HPI:   Antonieta presents today with     COPD:  This is a chronic unstable issue, new to me.  Patient was recently admitted for her first episode of COPD with exacerbation.  She denied having any diagnosed breathing issues previously.  She did find herself that she had been more short of breath recently.  She was treated for exacerbation and discharged however her inhalers even when authorized more expensive for her.  The only inhaler she has is her albuterol which she is having to use frequently because of her shortness of breath.  She is here for further evaluation.    Chronic Low Back Pain:  This is a chronic unstable issue, new to me.  Patient followed by Dr. Matta of pain management.  She requires her gabapentin and oxycodone from him.  She is unsure as to what her chronic back pain is from but she states that she has quotation \"burst\".  She finds the oxycodone's work well for her.    HTN:  This is a chronic unstable issue, new to me.  Patient has a history of hypertension diagnosed approximately 8 years ago but then she lost weight and she said that she resolved her blood pressure.  She was found to have elevated blood pressures on admission and also significantly elevated blood pressures on discharge.  She was started on lisinopril 10 mg and amlodipine 5 g.  During elevated blood pressures she has a very searing headache.      Past Medical History:   Diagnosis Date   • Alcohol abuse 7/20/2019   • Angina     occasional, had cardiac workup \"ait was fine\"   • Arthritis     rheumatoid; hands & R shoulder   • ASTHMA     \"not an issue since pneumonia in 1995\"   • Back pain    • Bowel habit changes     constipation   • Bronchitis 1995   • Cancer (HCC)     colon CA 1977   • Cataract     surgical repair bilateral   • COPD     asthma, denies COPD   • Dental disorder     upper partial denture   • Fall     occasionally due to macular degeneration   • Heart burn    • Heart murmur     as child "   • Hepatitis A 1980's   • Hiatus hernia syndrome    • Hypertension     weight loss, taken off medication approx 2014   • Macular degeneration    • Other specified symptom associated with female genital organs     hysterectomy   • Pain     mid back, low back   • PAIN DISORDER    • Panic attack    • Personal history of venous thrombosis and embolism     1970   • Pneumonia 1995   • Psychiatric problem     panic attacks   • Snoring     sleep study done approx 1998, no treatment recommended       Social History     Tobacco Use   • Smoking status: Current Some Day Smoker     Packs/day: 0.50     Years: 32.00     Pack years: 16.00     Types: Cigarettes     Start date: 1/1/1978   • Smokeless tobacco: Never Used   • Tobacco comment: 1 ppd times 30yrs   Substance Use Topics   • Alcohol use: Never     Frequency: 2-4 times a month   • Drug use: No       Current Outpatient Medications Ordered in Epic   Medication Sig Dispense Refill   • oxycodone (OXY-IR) 15 MG immediate release tablet Take 15 mg by mouth every four hours as needed for Severe Pain.     • Fluticasone Furoate-Vilanterol (BREO ELLIPTA) 100-25 MCG/INH AEROSOL POWDER, BREATH ACTIVATED Inhale 1 Puff by mouth every day. 3 Each 3   • ipratropium (ATROVENT) 17 MCG/ACT Aero Soln Inhale 2 Puffs by mouth every 6 hours. 17 g 3   • lisinopril (PRINIVIL) 10 MG Tab Take 2 Tabs by mouth 2 times a day. 180 Tab 3   • albuterol 108 (90 Base) MCG/ACT Aero Soln inhalation aerosol Inhale 2 Puffs by mouth every 6 hours as needed for Shortness of Breath. Indications: Asthma, Chronic Obstructive Lung Disease 8.5 g 3   • nicotine (NICODERM) 21 MG/24HR PATCH 24 HR Apply 1 Patch to skin as directed every 24 hours. 30 Patch 0   • amLODIPine (NORVASC) 5 MG Tab Take 1 Tab by mouth every day. 30 Tab 2   • famotidine (PEPCID) 20 MG Tab Take 1 Tab by mouth 2 Times a Day. 60 Tab 0   • aspirin 81 MG tablet Take 81 mg by mouth every day.     • gabapentin (NEURONTIN) 300 MG Cap Take 300 mg by mouth 3  "times a day.       No current [facility]-ordered facility-administered medications on file.        Allergies:  Iodine and Tape    ROS:  Gen: no fevers/chill, no changes in weight  Eyes: no changes in vision  ENT: no sore throat, no hearing loss, no bloody nose  Pulm: no sob, no cough  CV: no chest pain, no palpitations  GI: no nausea/vomiting, no diarrhea  : no dysuria  MSk: no myalgias  Skin: no rash  Neuro: no headaches, no numbness/tingling  Heme/Lymph: no easy bruising      Objective:       Exam:  /90 (BP Location: Left arm, Patient Position: Sitting)   Pulse (!) 106   Temp 37.1 °C (98.7 °F) (Temporal)   Ht 1.473 m (4' 10\")   Wt 48.9 kg (107 lb 12.8 oz)   SpO2 98%   BMI 22.53 kg/m²  Body mass index is 22.53 kg/m².    Gen: Alert and oriented, No apparent distress.  Neck: Neck is supple without lymphadenopathy.  Lungs: Normal effort, CTA bilaterally, no wheezes, rhonchi, or rales  CV: Regular rate and rhythm. No murmurs, rubs, or gallops.               Ext: No clubbing, cyanosis, edema.      Assessment & Plan:     64 y.o. female with the following -     1. Need for vaccination  Ordered today  - INFLUENZA VACCINE, HIGH DOSE (65+ ONLY)  - Tdap =>8yo IM    2. Chronic obstructive pulmonary disease, unspecified COPD type (HCC)  Upon review of paperwork Breo Ellipta should be covered by insurance as well as Atrovent.  Maintain on albuterol as needed at this point.  Patient will need to do PFTs at this time for formal diagnosis of COPD.  Counseled extensively on follow-up in ER precautions.  - PULMONARY FUNCTION TESTS -Test requested: Complete Pulmonary Function Test; Future  - HEMOGLOBIN A1C; Future  - CBC WITH DIFFERENTIAL; Future  - Comp Metabolic Panel; Future  - Lipid Profile; Future  - TSH WITH REFLEX TO FT4; Future    3. Chronic pancreatitis, unspecified pancreatitis type (HCC)  History of chronic pancreatitis and cyst on pancreas.  She has had 3 bouts.  She is taking famotidine for this reason.    4. Need " for hepatitis C screening test  Ordered today  - HEP C VIRUS ANTIBODY; Future    5. Abnormal finding of blood chemistry   Ordered today  - HEMOGLOBIN A1C; Future  - Lipid Profile; Future    6. Breast cancer screening  Ordered today  - [id number]; Future    7. Current smoker  Counseled patient on tobacco use.  Patient is to ready to quit at this time.  Would like to trial the patch.  - DS-BONE DENSITY STUDY (DEXA); Future        Please note that this dictation was created using voice recognition software. I have made every reasonable attempt to correct obvious errors, but I expect that there are errors of grammar and possibly content that I did not discover before finalizing the note.

## 2019-09-17 RX ORDER — LISINOPRIL 10 MG/1
20 TABLET ORAL 2 TIMES DAILY
Qty: 180 TAB | Refills: 3 | Status: SHIPPED | OUTPATIENT
Start: 2019-09-17 | End: 2024-02-22

## 2019-09-17 NOTE — TELEPHONE ENCOUNTER
Was the patient seen in the last year in this department? Yes 9/10/2019    Does patient have an active prescription for medications requested? No     Received Request Via: Pharmacy
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
## 2019-11-05 ENCOUNTER — APPOINTMENT (OUTPATIENT)
Dept: MEDICAL GROUP | Facility: LAB | Age: 64
End: 2019-11-05
Payer: MEDICARE

## 2020-04-02 RX ORDER — LISINOPRIL 20 MG/1
TABLET ORAL
Qty: 90 TAB | Refills: 2 | Status: SHIPPED | OUTPATIENT
Start: 2020-04-02 | End: 2020-08-06

## 2020-04-02 NOTE — TELEPHONE ENCOUNTER
Received request via: Pharmacy    Was the patient seen in the last year in this department? Yes  6/10/19  Does the patient have an active prescription (recently filled or refills available) for medication(s) requested? No

## 2020-08-06 RX ORDER — LISINOPRIL 20 MG/1
TABLET ORAL
Qty: 90 TAB | Refills: 2 | Status: SHIPPED | OUTPATIENT
Start: 2020-08-06 | End: 2020-11-23 | Stop reason: SDUPTHER

## 2020-08-06 NOTE — TELEPHONE ENCOUNTER
Received request via: Pharmacy    Was the patient seen in the last year in this department? Yes  9/10/19  Does the patient have an active prescription (recently filled or refills available) for medication(s) requested? No

## 2020-11-23 RX ORDER — LISINOPRIL 20 MG/1
TABLET ORAL
Qty: 90 TAB | Refills: 0 | Status: SHIPPED | OUTPATIENT
Start: 2020-11-23 | End: 2024-02-22

## 2020-11-23 NOTE — TELEPHONE ENCOUNTER
Received request via: Pharmacy    Was the patient seen in the last year in this department? No   9/10/19  Does the patient have an active prescription (recently filled or refills available) for medication(s) requested? No

## 2021-03-03 DIAGNOSIS — Z23 NEED FOR VACCINATION: ICD-10-CM

## 2024-02-22 ENCOUNTER — HOSPITAL ENCOUNTER (OUTPATIENT)
Facility: MEDICAL CENTER | Age: 69
End: 2024-02-26
Attending: EMERGENCY MEDICINE | Admitting: HOSPITALIST
Payer: MEDICARE

## 2024-02-22 ENCOUNTER — APPOINTMENT (OUTPATIENT)
Dept: RADIOLOGY | Facility: MEDICAL CENTER | Age: 69
End: 2024-02-22
Attending: HOSPITALIST
Payer: MEDICARE

## 2024-02-22 ENCOUNTER — APPOINTMENT (OUTPATIENT)
Dept: CARDIOLOGY | Facility: MEDICAL CENTER | Age: 69
End: 2024-02-22
Attending: HOSPITALIST
Payer: MEDICARE

## 2024-02-22 ENCOUNTER — APPOINTMENT (OUTPATIENT)
Dept: RADIOLOGY | Facility: MEDICAL CENTER | Age: 69
End: 2024-02-22
Attending: EMERGENCY MEDICINE
Payer: MEDICARE

## 2024-02-22 DIAGNOSIS — J44.9 CHRONIC OBSTRUCTIVE PULMONARY DISEASE, UNSPECIFIED COPD TYPE (HCC): ICD-10-CM

## 2024-02-22 DIAGNOSIS — Z72.0 TOBACCO ABUSE: ICD-10-CM

## 2024-02-22 DIAGNOSIS — R07.9 CHEST PAIN, UNSPECIFIED TYPE: ICD-10-CM

## 2024-02-22 DIAGNOSIS — F41.9 ANXIETY: ICD-10-CM

## 2024-02-22 DIAGNOSIS — S22.42XA CLOSED FRACTURE OF MULTIPLE RIBS OF LEFT SIDE, INITIAL ENCOUNTER: ICD-10-CM

## 2024-02-22 LAB
ALBUMIN SERPL BCP-MCNC: 4.7 G/DL (ref 3.2–4.9)
ALBUMIN/GLOB SERPL: 1.6 G/DL
ALP SERPL-CCNC: 114 U/L (ref 30–99)
ALT SERPL-CCNC: <5 U/L (ref 2–50)
ANION GAP SERPL CALC-SCNC: 14 MMOL/L (ref 7–16)
AST SERPL-CCNC: 10 U/L (ref 12–45)
BASOPHILS # BLD AUTO: 1.8 % (ref 0–1.8)
BASOPHILS # BLD: 0.11 K/UL (ref 0–0.12)
BILIRUB SERPL-MCNC: 0.2 MG/DL (ref 0.1–1.5)
BUN SERPL-MCNC: 14 MG/DL (ref 8–22)
CALCIUM ALBUM COR SERPL-MCNC: 8.7 MG/DL (ref 8.5–10.5)
CALCIUM SERPL-MCNC: 9.3 MG/DL (ref 8.4–10.2)
CHLORIDE SERPL-SCNC: 106 MMOL/L (ref 96–112)
CO2 SERPL-SCNC: 21 MMOL/L (ref 20–33)
CREAT SERPL-MCNC: 0.54 MG/DL (ref 0.5–1.4)
D DIMER PPP IA.FEU-MCNC: 1.24 UG/ML (FEU) (ref 0–0.5)
EKG IMPRESSION: NORMAL
EOSINOPHIL # BLD AUTO: 0.03 K/UL (ref 0–0.51)
EOSINOPHIL NFR BLD: 0.5 % (ref 0–6.9)
ERYTHROCYTE [DISTWIDTH] IN BLOOD BY AUTOMATED COUNT: 50.5 FL (ref 35.9–50)
ETHANOL BLD-MCNC: 224.3 MG/DL
GFR SERPLBLD CREATININE-BSD FMLA CKD-EPI: 98 ML/MIN/1.73 M 2
GLOBULIN SER CALC-MCNC: 2.9 G/DL (ref 1.9–3.5)
GLUCOSE SERPL-MCNC: 97 MG/DL (ref 65–99)
HCT VFR BLD AUTO: 44.7 % (ref 37–47)
HGB BLD-MCNC: 14.9 G/DL (ref 12–16)
IMM GRANULOCYTES # BLD AUTO: 0.05 K/UL (ref 0–0.11)
IMM GRANULOCYTES NFR BLD AUTO: 0.8 % (ref 0–0.9)
LIPASE SERPL-CCNC: 18 U/L (ref 11–82)
LV EJECT FRACT  99904: 60
LV EJECT FRACT MOD 2C 99903: 52.18
LV EJECT FRACT MOD 4C 99902: 57.22
LV EJECT FRACT MOD BP 99901: 57.42
LYMPHOCYTES # BLD AUTO: 1.19 K/UL (ref 1–4.8)
LYMPHOCYTES NFR BLD: 19.2 % (ref 22–41)
MAGNESIUM SERPL-MCNC: 2.1 MG/DL (ref 1.5–2.5)
MCH RBC QN AUTO: 33.7 PG (ref 27–33)
MCHC RBC AUTO-ENTMCNC: 33.3 G/DL (ref 32.2–35.5)
MCV RBC AUTO: 101.1 FL (ref 81.4–97.8)
MONOCYTES # BLD AUTO: 0.33 K/UL (ref 0–0.85)
MONOCYTES NFR BLD AUTO: 5.3 % (ref 0–13.4)
NEUTROPHILS # BLD AUTO: 4.5 K/UL (ref 1.82–7.42)
NEUTROPHILS NFR BLD: 72.4 % (ref 44–72)
NRBC # BLD AUTO: 0 K/UL
NRBC BLD-RTO: 0 /100 WBC (ref 0–0.2)
PHOSPHATE SERPL-MCNC: 2.3 MG/DL (ref 2.5–4.5)
PLATELET # BLD AUTO: 352 K/UL (ref 164–446)
PMV BLD AUTO: 8.8 FL (ref 9–12.9)
POTASSIUM SERPL-SCNC: 4.1 MMOL/L (ref 3.6–5.5)
PROT SERPL-MCNC: 7.6 G/DL (ref 6–8.2)
RBC # BLD AUTO: 4.42 M/UL (ref 4.2–5.4)
SODIUM SERPL-SCNC: 141 MMOL/L (ref 135–145)
TROPONIN T SERPL-MCNC: 7 NG/L (ref 6–19)
TROPONIN T SERPL-MCNC: <6 NG/L (ref 6–19)
WBC # BLD AUTO: 6.2 K/UL (ref 4.8–10.8)

## 2024-02-22 PROCEDURE — 83735 ASSAY OF MAGNESIUM: CPT

## 2024-02-22 PROCEDURE — 96372 THER/PROPH/DIAG INJ SC/IM: CPT | Mod: XU

## 2024-02-22 PROCEDURE — 99285 EMERGENCY DEPT VISIT HI MDM: CPT

## 2024-02-22 PROCEDURE — 85379 FIBRIN DEGRADATION QUANT: CPT

## 2024-02-22 PROCEDURE — G0378 HOSPITAL OBSERVATION PER HR: HCPCS

## 2024-02-22 PROCEDURE — 83690 ASSAY OF LIPASE: CPT

## 2024-02-22 PROCEDURE — 71250 CT THORAX DX C-: CPT

## 2024-02-22 PROCEDURE — 99223 1ST HOSP IP/OBS HIGH 75: CPT | Mod: 25 | Performed by: HOSPITALIST

## 2024-02-22 PROCEDURE — 700102 HCHG RX REV CODE 250 W/ 637 OVERRIDE(OP): Performed by: EMERGENCY MEDICINE

## 2024-02-22 PROCEDURE — 85025 COMPLETE CBC W/AUTO DIFF WBC: CPT

## 2024-02-22 PROCEDURE — 80053 COMPREHEN METABOLIC PANEL: CPT

## 2024-02-22 PROCEDURE — 82077 ASSAY SPEC XCP UR&BREATH IA: CPT

## 2024-02-22 PROCEDURE — 700102 HCHG RX REV CODE 250 W/ 637 OVERRIDE(OP): Performed by: HOSPITALIST

## 2024-02-22 PROCEDURE — 93306 TTE W/DOPPLER COMPLETE: CPT

## 2024-02-22 PROCEDURE — 93306 TTE W/DOPPLER COMPLETE: CPT | Mod: 26 | Performed by: INTERNAL MEDICINE

## 2024-02-22 PROCEDURE — 99407 BEHAV CHNG SMOKING > 10 MIN: CPT | Performed by: HOSPITALIST

## 2024-02-22 PROCEDURE — 84484 ASSAY OF TROPONIN QUANT: CPT

## 2024-02-22 PROCEDURE — 93005 ELECTROCARDIOGRAM TRACING: CPT | Performed by: HOSPITALIST

## 2024-02-22 PROCEDURE — 700111 HCHG RX REV CODE 636 W/ 250 OVERRIDE (IP): Mod: JZ | Performed by: HOSPITALIST

## 2024-02-22 PROCEDURE — A9270 NON-COVERED ITEM OR SERVICE: HCPCS | Performed by: HOSPITALIST

## 2024-02-22 PROCEDURE — A9270 NON-COVERED ITEM OR SERVICE: HCPCS | Performed by: EMERGENCY MEDICINE

## 2024-02-22 PROCEDURE — 99407 BEHAV CHNG SMOKING > 10 MIN: CPT

## 2024-02-22 PROCEDURE — 93005 ELECTROCARDIOGRAM TRACING: CPT | Performed by: EMERGENCY MEDICINE

## 2024-02-22 PROCEDURE — 96374 THER/PROPH/DIAG INJ IV PUSH: CPT

## 2024-02-22 PROCEDURE — 71045 X-RAY EXAM CHEST 1 VIEW: CPT

## 2024-02-22 PROCEDURE — 36415 COLL VENOUS BLD VENIPUNCTURE: CPT

## 2024-02-22 PROCEDURE — 84100 ASSAY OF PHOSPHORUS: CPT

## 2024-02-22 RX ORDER — ASPIRIN 81 MG/1
324 TABLET, CHEWABLE ORAL DAILY
Status: DISCONTINUED | OUTPATIENT
Start: 2024-02-22 | End: 2024-02-26 | Stop reason: HOSPADM

## 2024-02-22 RX ORDER — ACETAMINOPHEN 325 MG/1
650 TABLET ORAL EVERY 6 HOURS PRN
Status: DISCONTINUED | OUTPATIENT
Start: 2024-02-22 | End: 2024-02-26 | Stop reason: HOSPADM

## 2024-02-22 RX ORDER — ENOXAPARIN SODIUM 100 MG/ML
40 INJECTION SUBCUTANEOUS DAILY
Status: DISCONTINUED | OUTPATIENT
Start: 2024-02-22 | End: 2024-02-26 | Stop reason: HOSPADM

## 2024-02-22 RX ORDER — LISINOPRIL 5 MG/1
10 TABLET ORAL 2 TIMES DAILY
Status: DISCONTINUED | OUTPATIENT
Start: 2024-02-22 | End: 2024-02-26 | Stop reason: HOSPADM

## 2024-02-22 RX ORDER — OXYCODONE HYDROCHLORIDE 5 MG/1
5 TABLET ORAL
Status: DISCONTINUED | OUTPATIENT
Start: 2024-02-22 | End: 2024-02-26 | Stop reason: HOSPADM

## 2024-02-22 RX ORDER — ASPIRIN 325 MG
325 TABLET ORAL DAILY
Status: DISCONTINUED | OUTPATIENT
Start: 2024-02-22 | End: 2024-02-26 | Stop reason: HOSPADM

## 2024-02-22 RX ORDER — ASPIRIN 300 MG/1
300 SUPPOSITORY RECTAL DAILY
Status: DISCONTINUED | OUTPATIENT
Start: 2024-02-22 | End: 2024-02-26 | Stop reason: HOSPADM

## 2024-02-22 RX ORDER — NITROGLYCERIN 0.4 MG/1
0.4 TABLET SUBLINGUAL
Status: DISCONTINUED | OUTPATIENT
Start: 2024-02-22 | End: 2024-02-26 | Stop reason: HOSPADM

## 2024-02-22 RX ORDER — ALPRAZOLAM 0.25 MG/1
0.25 TABLET ORAL 3 TIMES DAILY PRN
Status: DISCONTINUED | OUTPATIENT
Start: 2024-02-22 | End: 2024-02-26 | Stop reason: HOSPADM

## 2024-02-22 RX ORDER — LISINOPRIL 10 MG/1
10 TABLET ORAL 2 TIMES DAILY
COMMUNITY

## 2024-02-22 RX ORDER — MORPHINE SULFATE 4 MG/ML
2 INJECTION INTRAVENOUS
Status: DISCONTINUED | OUTPATIENT
Start: 2024-02-22 | End: 2024-02-26 | Stop reason: HOSPADM

## 2024-02-22 RX ORDER — FLUTICASONE FUROATE, UMECLIDINIUM BROMIDE AND VILANTEROL TRIFENATATE 200; 62.5; 25 UG/1; UG/1; UG/1
1 POWDER RESPIRATORY (INHALATION) DAILY
COMMUNITY

## 2024-02-22 RX ORDER — AMLODIPINE BESYLATE 5 MG/1
5 TABLET ORAL DAILY
Status: DISCONTINUED | OUTPATIENT
Start: 2024-02-23 | End: 2024-02-26 | Stop reason: HOSPADM

## 2024-02-22 RX ORDER — ACETAMINOPHEN 500 MG
1000 TABLET ORAL ONCE
Status: COMPLETED | OUTPATIENT
Start: 2024-02-22 | End: 2024-02-22

## 2024-02-22 RX ORDER — ONDANSETRON 4 MG/1
4 TABLET, ORALLY DISINTEGRATING ORAL EVERY 4 HOURS PRN
Status: DISCONTINUED | OUTPATIENT
Start: 2024-02-22 | End: 2024-02-26 | Stop reason: HOSPADM

## 2024-02-22 RX ORDER — ACETAMINOPHEN 500 MG
500-1000 TABLET ORAL EVERY 6 HOURS PRN
COMMUNITY

## 2024-02-22 RX ORDER — NICOTINE 21 MG/24HR
21 PATCH, TRANSDERMAL 24 HOURS TRANSDERMAL
Status: DISCONTINUED | OUTPATIENT
Start: 2024-02-22 | End: 2024-02-26 | Stop reason: HOSPADM

## 2024-02-22 RX ORDER — AMINOPHYLLINE 25 MG/ML
100 INJECTION, SOLUTION INTRAVENOUS
Status: DISCONTINUED | OUTPATIENT
Start: 2024-02-22 | End: 2024-02-26 | Stop reason: HOSPADM

## 2024-02-22 RX ORDER — OXYCODONE HYDROCHLORIDE 5 MG/1
2.5 TABLET ORAL
Status: DISCONTINUED | OUTPATIENT
Start: 2024-02-22 | End: 2024-02-26 | Stop reason: HOSPADM

## 2024-02-22 RX ORDER — ONDANSETRON 2 MG/ML
4 INJECTION INTRAMUSCULAR; INTRAVENOUS EVERY 4 HOURS PRN
Status: DISCONTINUED | OUTPATIENT
Start: 2024-02-22 | End: 2024-02-26 | Stop reason: HOSPADM

## 2024-02-22 RX ORDER — REGADENOSON 0.08 MG/ML
0.4 INJECTION, SOLUTION INTRAVENOUS
Status: COMPLETED | OUTPATIENT
Start: 2024-02-22 | End: 2024-02-25

## 2024-02-22 RX ADMIN — ONDANSETRON 4 MG: 4 TABLET, ORALLY DISINTEGRATING ORAL at 21:43

## 2024-02-22 RX ADMIN — LISINOPRIL 10 MG: 5 TABLET ORAL at 17:13

## 2024-02-22 RX ADMIN — NICOTINE 21 MG: 21 PATCH, EXTENDED RELEASE TRANSDERMAL at 16:43

## 2024-02-22 RX ADMIN — ACETAMINOPHEN 1000 MG: 500 TABLET ORAL at 14:08

## 2024-02-22 RX ADMIN — OXYCODONE HYDROCHLORIDE 5 MG: 5 TABLET ORAL at 21:44

## 2024-02-22 RX ADMIN — OXYCODONE HYDROCHLORIDE 5 MG: 5 TABLET ORAL at 18:15

## 2024-02-22 RX ADMIN — ENOXAPARIN SODIUM 40 MG: 100 INJECTION SUBCUTANEOUS at 17:14

## 2024-02-22 RX ADMIN — ACETAMINOPHEN 650 MG: 325 TABLET ORAL at 21:40

## 2024-02-22 RX ADMIN — MORPHINE SULFATE 2 MG: 4 INJECTION, SOLUTION INTRAMUSCULAR; INTRAVENOUS at 14:42

## 2024-02-22 ASSESSMENT — ENCOUNTER SYMPTOMS
NERVOUS/ANXIOUS: 0
CONSTITUTIONAL NEGATIVE: 1
EYES NEGATIVE: 1
HEMOPTYSIS: 0
WHEEZING: 0
FEVER: 0
BLOOD IN STOOL: 0
FOCAL WEAKNESS: 0
MUSCULOSKELETAL NEGATIVE: 1
VOMITING: 0
ABDOMINAL PAIN: 1
SEIZURES: 0
PALPITATIONS: 0
DIARRHEA: 0
HEADACHES: 0
DOUBLE VISION: 0
CHILLS: 0
LOSS OF CONSCIOUSNESS: 0
PSYCHIATRIC NEGATIVE: 1
NEUROLOGICAL NEGATIVE: 1
NAUSEA: 0
SHORTNESS OF BREATH: 1
BRUISES/BLEEDS EASILY: 0
DIAPHORESIS: 0
DIZZINESS: 0
COUGH: 1
HEARTBURN: 0
CONSTIPATION: 0

## 2024-02-22 ASSESSMENT — COGNITIVE AND FUNCTIONAL STATUS - GENERAL
SUGGESTED CMS G CODE MODIFIER DAILY ACTIVITY: CH
SUGGESTED CMS G CODE MODIFIER MOBILITY: CI
CLIMB 3 TO 5 STEPS WITH RAILING: A LITTLE
MOBILITY SCORE: 23
DAILY ACTIVITIY SCORE: 24

## 2024-02-22 ASSESSMENT — FIBROSIS 4 INDEX
FIB4 SCORE: 0.94
FIB4 SCORE: 1.08

## 2024-02-22 ASSESSMENT — PATIENT HEALTH QUESTIONNAIRE - PHQ9
SUM OF ALL RESPONSES TO PHQ9 QUESTIONS 1 AND 2: 0
1. LITTLE INTEREST OR PLEASURE IN DOING THINGS: NOT AT ALL
2. FEELING DOWN, DEPRESSED, IRRITABLE, OR HOPELESS: NOT AT ALL

## 2024-02-22 ASSESSMENT — VISUAL ACUITY: OU: 1

## 2024-02-22 NOTE — ASSESSMENT & PLAN NOTE
The ASCVD Risk score (Jennifer WEST, et al., 2019) failed to calculate for the following reasons:    Cannot find a previous HDL lab    Cannot find a previous total cholesterol lab  Reports falling on Super Bowl Sunday  Patient has contrast allergy was not able to undergo a CT with contrast  CT of the chest confirms fractures on the left ribs 5 and 6  VQ scan shows no evidence of pulmonary embolus  Stress test pending today  Echocardiogram shows preserved ejection fraction of 60% without significant valvular abnormalities  Morphine for pain management

## 2024-02-22 NOTE — ED NOTES
EKG done - artifact noted since pt is unable to hold still and is shaking.  Blankets offered, but she states she isn't cold & states she is going to have a panic attack.

## 2024-02-22 NOTE — ED PROVIDER NOTES
ED Provider Note    CHIEF COMPLAINT  Chief Complaint   Patient presents with    Chest Pain         HPI/ROS    OUTSIDE HISTORIAN(S):  EMS and the patient was complaining of chest pain for the last several days intermittently.  She was sitting outside when they arrived she appears to be intoxicated.  He states she was extremely cold.  EKG was completed that showed no ST elevation myocardial infarction and during transport she was pain-free.    Antonieta Mcdonald is a 68 y.o. female who presents with complaint of chest pain.  She has had no chest pain for the last several days.  She states that she fell approximately a week ago landing on her left chest wall and had chest pain that point.  The pain is increasing severity today and is very severe and she think she may have pneumonia.  The pain is in left side of her chest chest with inspiration decreases the rest there is no radiation to her jaw, arms or back.  She called EMS today and was sitting outside the porch and they arrived and she was extremely cold.  She states has been drinking alcohol throughout the evening.  Patient denies cough, fever, shakes, chills, sweats, nausea, vomiting.  Patient endorses that she was drinking alcohol excessively earlier today.    PAST MEDICAL HISTORY   has a past medical history of Alcohol abuse (7/20/2019), Angina, Arthritis, ASTHMA, Back pain, Bowel habit changes, Bronchitis (1995), Cancer (HCC), Cataract, COPD, Dental disorder, Fall, Heart burn, Heart murmur, Hepatitis A (1980's), Hiatus hernia syndrome, Hypertension, Macular degeneration, Other specified symptom associated with female genital organs, Pain, PAIN DISORDER, Panic attack, Personal history of venous thrombosis and embolism, Pneumonia (1995), Psychiatric problem, and Snoring.    SURGICAL HISTORY   has a past surgical history that includes hysterectomy, total abdominal (1975); tonsillectomy (1957); primary c section (1972, 1973); colon resection (1983); cataract  "extraction with iol (2013); eye surgery (Bilateral, 1950's); laparotomy (1977); laparoscopy (1980's); gastroscopy-endo (N/A, 4/8/2016); egd w/endoscopic ultrasound (N/A, 4/8/2016); and egd with asp/bx (N/A, 4/8/2016).    FAMILY HISTORY  Family History   Problem Relation Age of Onset    Diabetes Mother     Hypertension Father     Hyperlipidemia Father     Heart Disease Father        SOCIAL HISTORY  Social History     Tobacco Use    Smoking status: Every Day     Current packs/day: 0.50     Average packs/day: 0.5 packs/day for 46.1 years (23.1 ttl pk-yrs)     Types: Cigarettes     Start date: 1/1/1978    Smokeless tobacco: Never    Tobacco comments:     1 ppd times 30yrs   Vaping Use    Vaping Use: Never used   Substance and Sexual Activity    Alcohol use: Yes    Drug use: No    Sexual activity: Not on file       CURRENT MEDICATIONS  Home Medications       Reviewed by Amor Cotton M.D. (Physician) on 02/22/24 at 1417  Med List Status: Complete     Medication Last Dose Status   acetaminophen (TYLENOL) 500 MG Tab 2/22/2024 Active   amLODIPine (NORVASC) 5 MG Tab 2/22/2024 Active   fluticasone-umeclidinium-vilanterol (TRELEGY ELLIPTA) 200-62.5-25 mcg/act inhaler 2/22/2024 Active   lisinopril (PRINIVIL) 10 MG Tab 2/22/2024 Active                    ALLERGIES  Allergies   Allergen Reactions    Iodine Anaphylaxis and Shortness of Breath     'I coded\"     Tape Rash     Peels skin off  Paper tape ok, if it is not on to long.       PHYSICAL EXAM  VITAL SIGNS: /75   Pulse 100   Temp 36.4 °C (97.5 °F) (Temporal)   Resp (!) 52   Ht 1.499 m (4' 11\")   Wt 52.2 kg (115 lb)   SpO2 88%   BMI 23.23 kg/m²      Nursing notes and vitals reviewed.  Constitutional: Well developed, Mels of alcohol, well nourished, No acute distress, Non-toxic appearance.   Eyes: PERRLA, EOMI, Conjunctiva normal, No discharge.   HENT: Normocephalic, Atraumatic, moist mucous membranes, no facial edema   Cardiovascular: Normal heart rate, Normal " rhythm, No murmurs, No rubs, No gallops.   Thorax & Lungs: No respiratory distress, No rales, No rhonchi, No wheezing, left anterior chest wall tenderness, no crepitus, step-off deformity  Abdomen: Bowel sounds normal, Soft, No tenderness, No guarding, No rebound, No masses, No pulsatile masses.   Skin: Warm, Dry, No erythema, No rash.   Extremities: No deformity, no edema, good range of motion range of motion upper lower extremes bilaterally  Neurologic: Alert & oriented x 3, no focal abnormalities noted, acting appropriately on examination  Psychiatric: Affect normal for clinical presentation.      DIAGNOSTIC STUDIES / PROCEDURES  EKG  I have independently interpreted this EKG  Normal sinus rhythm on monitor    LABS  Results for orders placed or performed during the hospital encounter of 02/22/24   CBC w/ Differential   Result Value Ref Range    WBC 6.2 4.8 - 10.8 K/uL    RBC 4.42 4.20 - 5.40 M/uL    Hemoglobin 14.9 12.0 - 16.0 g/dL    Hematocrit 44.7 37.0 - 47.0 %    .1 (H) 81.4 - 97.8 fL    MCH 33.7 (H) 27.0 - 33.0 pg    MCHC 33.3 32.2 - 35.5 g/dL    RDW 50.5 (H) 35.9 - 50.0 fL    Platelet Count 352 164 - 446 K/uL    MPV 8.8 (L) 9.0 - 12.9 fL    Neutrophils-Polys 72.40 (H) 44.00 - 72.00 %    Lymphocytes 19.20 (L) 22.00 - 41.00 %    Monocytes 5.30 0.00 - 13.40 %    Eosinophils 0.50 0.00 - 6.90 %    Basophils 1.80 0.00 - 1.80 %    Immature Granulocytes 0.80 0.00 - 0.90 %    Nucleated RBC 0.00 0.00 - 0.20 /100 WBC    Neutrophils (Absolute) 4.50 1.82 - 7.42 K/uL    Lymphs (Absolute) 1.19 1.00 - 4.80 K/uL    Monos (Absolute) 0.33 0.00 - 0.85 K/uL    Eos (Absolute) 0.03 0.00 - 0.51 K/uL    Baso (Absolute) 0.11 0.00 - 0.12 K/uL    Immature Granulocytes (abs) 0.05 0.00 - 0.11 K/uL    NRBC (Absolute) 0.00 K/uL   Complete Metabolic Panel (CMP)   Result Value Ref Range    Sodium 141 135 - 145 mmol/L    Potassium 4.1 3.6 - 5.5 mmol/L    Chloride 106 96 - 112 mmol/L    Co2 21 20 - 33 mmol/L    Anion Gap 14.0 7.0 -  16.0    Glucose 97 65 - 99 mg/dL    Bun 14 8 - 22 mg/dL    Creatinine 0.54 0.50 - 1.40 mg/dL    Calcium 9.3 8.4 - 10.2 mg/dL    Correct Calcium 8.7 8.5 - 10.5 mg/dL    AST(SGOT) 10 (L) 12 - 45 U/L    ALT(SGPT) <5 2 - 50 U/L    Alkaline Phosphatase 114 (H) 30 - 99 U/L    Total Bilirubin 0.2 0.1 - 1.5 mg/dL    Albumin 4.7 3.2 - 4.9 g/dL    Total Protein 7.6 6.0 - 8.2 g/dL    Globulin 2.9 1.9 - 3.5 g/dL    A-G Ratio 1.6 g/dL   Troponin - STAT Once   Result Value Ref Range    Troponin T 7 6 - 19 ng/L   Magnesium   Result Value Ref Range    Magnesium 2.1 1.5 - 2.5 mg/dL   Phosphorus   Result Value Ref Range    Phosphorus 2.3 (L) 2.5 - 4.5 mg/dL   D-DIMER   Result Value Ref Range    D-Dimer 1.24 (H) 0.00 - 0.50 ug/mL (FEU)   ETHYL ALCOHOL (BLOOD)   Result Value Ref Range    Diagnostic Alcohol 224.3 (H) <10.1 mg/dL   ESTIMATED GFR   Result Value Ref Range    GFR (CKD-EPI) 98 >60 mL/min/1.73 m 2   EKG   Result Value Ref Range    Report       [facility] Emergency Dept.    Test Date:  2024  Pt Name:    MARCI PAZFLASH         Department: [facility]  MRN:        [id number]                      [facility]  Gender:     Female                       Technician: [id number]  :        [id number]                   Requested By:DAREN EMERY  Order #:    [id number]                    Reading MD:    Measurements  Intervals                                Axis  Rate:       96                           P:          0  ME:         0                            QRS:        0  QRSD:       78                           T:          42  QT:         345  QTc:        436    Interpretive Statements  Atrial fibrillation  Low voltage, precordial leads  Compared to ECG 2018 14:27:13  Low QRS voltage now present  Sinus rhythm no longer present           RADIOLOGY  I have independently interpreted the diagnostic imaging associated with this visit and am waiting the final reading from the radiologist.   My  preliminary interpretation is as follows: Negative for infiltrate, chest x-ray  Radiologist interpretation:   DX-CHEST-PORTABLE (1 VIEW)   Final Result         1. No acute cardiopulmonary abnormalities are identified.      CT-CTA CHEST PULMONARY ARTERY W/ RECONS    (Results Pending)   NM-CARDIAC STRESS TEST    (Results Pending)   NM-LUNG VENT/PERF IMAGING    (Results Pending)         COURSE & MEDICAL DECISION MAKING    ED Observation Status? No; Patient does not meet criteria for ED Observation.     INITIAL ASSESSMENT, COURSE AND PLAN  Care Narrative: This is a pleasant 68-year-old female that presents with chest pain, slight shortness of breath.  Here in the emergency room, EKG shows no ST elevation, and she has negative troponin.  She has a heart score of 5 and concerned secondary to her chest pain pattern.  Patient had an elevated D-dimer the patient has a significant allergy to contrast and states last when she had she had a cardiac arrest.  The patient is not hypoxic but she is tachypneic and slightly tachycardic and she has a high pretest probability as well as elevated D-dimer for pulmonary embolism.  Patient is intoxicated had time to metabolize her alcohol.  I do believe the patient will warrant further evaluation with cardiac evaluation for possible nuclear medicine study and VQ scan for possible pulm embolism.    DISPOSITION AND DISCUSSIONS  I have discussed management of the patient with the following physicians and WHIT's: [place] with Dr. Avila for hospitalization.        FINAL DIAGNOSIS  1. Chest pain, unspecified type    2.  Alcohol intoxication       Electronically signed by: Caleb Metzger D.O., 2/22/2024 10:13 AM

## 2024-02-22 NOTE — ASSESSMENT & PLAN NOTE
Patient has been smoking 1 pack of cigarettes per day for over 40 years  COPD is pretty severe  Continue RT protocol  Nebulizer treatments  Oxygen as needed to keep oxygen saturations above 90%  Tobacco counseling cessation given  Currently needing 2L oxygen, productive cough.  Home oxygen ordered.

## 2024-02-22 NOTE — ASSESSMENT & PLAN NOTE
Evaluate lipase level  Patient apparently in the past was using oxycodone and this caused her to have pancreatitis.  Since then she says she has been pretty well-controlled but occasionally she does flareup  Pain management

## 2024-02-22 NOTE — ED NOTES
Pt reports she is a difficult stick and ultrasound is needed.  Staff with U/S training is not available.  PIV attempt by this RN x 1 without success.  Assist staff member to attempt.
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
## 2024-02-22 NOTE — ASSESSMENT & PLAN NOTE
-nicotine replacement protocol and cessation education provided   Patient has been smoking for over 40 years  Currently not interested in quitting

## 2024-02-22 NOTE — ED TRIAGE NOTES
Antonieta Mcdonald  68 y.o.  Chief Complaint   Patient presents with    Chest Pain     Pt BIB EMS from home with c/o chest pain.  Aspirin 324 mg given by EMS.  On arrival to the ER, pt is awake, c/o chest pain and reporting she has been drinking alcohol.  Per EMS, chest pain has been happening x 4 days, but pt reports pain has been longer.  Pt rates her pain 8/10.  Pt is shaky.  Last drink was today.  She reports she does not drink daily.  Warm blankets provided.

## 2024-02-22 NOTE — H&P
Hospital Medicine History & Physical Note    Date of Service  2/22/2024    Primary Care Physician  Pcp Pt States None    Consultants  None    Specialist Names: None    Code Status  Full Code    Chief Complaint  Chief Complaint   Patient presents with    Chest Pain       History of Presenting Illness  Antonieta Mcdonald is a 68 y.o. female who presented 2/22/2024 with chest pain that began on [place].  The patient apparently had a ground-level fall and landed on her left side hitting in the midclavicular line.  Ever since then she has been having chest pain but it started to get worse and worse over time.  The chest pain is sharp.  It is 5 out of 10 intensity.  Is accompanied by shortness of breath.  There is no nausea or vomiting but she did get diaphoretic a couple times when the chest pain comes on.  She does have cardiac risk factors from smoking to hypertension to family history.  The patient at this point will need a CT of the chest to evaluate for possible fractures.  No fractures were evident on chest x-ray.  Patient will need a VQ scan to evaluate for pulmonary embolism.  Patient will need serial cardiac markers followed by stress test in the morning.  Patient will also need an echocardiogram.  I have discussed all the steps with the patient and she agrees to the workup.  Patient will be admitted in a guarded condition..    I discussed the plan of care with patient, bedside RN, and emergency room physician Dr. Metzger .    Review of Systems  Review of Systems   Constitutional: Negative.  Negative for chills, diaphoresis and fever.   HENT: Negative.     Eyes: Negative.  Negative for double vision.   Respiratory:  Positive for cough and shortness of breath. Negative for hemoptysis and wheezing.    Cardiovascular:  Positive for chest pain (Sharp stabbing pain and 5 out of 10 intensity ongoing for several weeks). Negative for palpitations and leg swelling.   Gastrointestinal:  Positive for  abdominal pain (Right upper quadrant). Negative for blood in stool, constipation, diarrhea, heartburn, nausea and vomiting.   Genitourinary: Negative.  Negative for frequency, hematuria and urgency.   Musculoskeletal: Negative.  Negative for joint pain.   Skin: Negative.  Negative for itching and rash.   Neurological: Negative.  Negative for dizziness, focal weakness, seizures, loss of consciousness and headaches.   Endo/Heme/Allergies: Negative.  Does not bruise/bleed easily.   Psychiatric/Behavioral: Negative.  Negative for suicidal ideas. The patient is not nervous/anxious.    All other systems reviewed and are negative.      Past Medical History   has a past medical history of Alcohol abuse (7/20/2019), Angina, Arthritis, ASTHMA, Back pain, Bowel habit changes, Bronchitis (1995), Cancer (HCC), Cataract, COPD, Dental disorder, Fall, Heart burn, Heart murmur, Hepatitis A (1980's), Hiatus hernia syndrome, Hypertension, Macular degeneration, Other specified symptom associated with female genital organs, Pain, PAIN DISORDER, Panic attack, Personal history of venous thrombosis and embolism, Pneumonia (1995), Psychiatric problem, and Snoring.    Surgical History   has a past surgical history that includes hysterectomy, total abdominal (1975); tonsillectomy (1957); primary c section (1972, 1973); colon resection (1983); cataract extraction with iol (2013); eye surgery (Bilateral, 1950's); laparotomy (1977); laparoscopy (1980's); gastroscopy-endo (N/A, 4/8/2016); egd w/endoscopic ultrasound (N/A, 4/8/2016); and egd with asp/bx (N/A, 4/8/2016).     Family History  family history includes Diabetes in her mother; Heart Disease in her father; Hyperlipidemia in her father; Hypertension in her father.   Family history reviewed with patient. There is family history that is pertinent to the chief complaint.     Social History   reports that she has been smoking cigarettes. She started smoking about 46 years ago. She has a 23.1  "pack-year smoking history. She has never used smokeless tobacco. She reports current alcohol use. She reports that she does not use drugs.    Allergies  Allergies   Allergen Reactions    Iodine Anaphylaxis and Shortness of Breath     'I coded\"     Tape Rash     Peels skin off  Paper tape ok, if it is not on to long.       Medications  Prior to Admission Medications   Prescriptions Last Dose Informant Patient Reported? Taking?   acetaminophen (TYLENOL) 500 MG Tab 2/22/2024 at 0700 Patient Yes Yes   Sig: Take 500-1,000 mg by mouth every 6 hours as needed. Indications: Pain   amLODIPine (NORVASC) 5 MG Tab 2/22/2024 at AM Patient No Yes   Sig: Take 1 Tab by mouth every day.   fluticasone-umeclidinium-vilanterol (TRELEGY ELLIPTA) 200-62.5-25 mcg/act inhaler 2/22/2024 at AM Patient Yes Yes   Sig: Inhale 1 Puff every day.   lisinopril (PRINIVIL) 10 MG Tab 2/22/2024 at AM Patient Yes Yes   Sig: Take 10 mg by mouth 2 times a day.      Facility-Administered Medications: None       Physical Exam  Temp:  [36.4 °C (97.5 °F)] 36.4 °C (97.5 °F)  Pulse:  [] 100  Resp:  [19-52] 52  BP: (128-150)/(75-91) 136/75  SpO2:  [88 %-97 %] 88 %  Blood Pressure : 136/75   Temperature: 36.4 °C (97.5 °F)   Pulse: 100   Respiration: (!) 52   Pulse Oximetry: 88 %       Physical Exam  Vitals and nursing note reviewed. Exam conducted with a chaperone present.   Constitutional:       General: She is awake.      Appearance: Normal appearance. She is well-developed, well-groomed and normal weight. She is not ill-appearing or diaphoretic.   HENT:      Head: Normocephalic and atraumatic.      Jaw: There is normal jaw occlusion. No trismus.      Salivary Glands: Right salivary gland is not tender. Left salivary gland is not tender.      Right Ear: External ear normal. There is no impacted cerumen.      Left Ear: External ear normal. There is no impacted cerumen.      Nose: Nose normal. No congestion or rhinorrhea.      Mouth/Throat:      Mouth: " Mucous membranes are moist.      Pharynx: Oropharynx is clear.   Eyes:      General: Lids are normal. Vision grossly intact.      Extraocular Movements: Extraocular movements intact.      Conjunctiva/sclera: Conjunctivae normal.      Right eye: Right conjunctiva is not injected. No exudate.     Left eye: Left conjunctiva is not injected. No exudate.     Pupils: Pupils are equal, round, and reactive to light.   Neck:      Thyroid: No thyroid mass.      Vascular: No hepatojugular reflux or JVD.      Trachea: No abnormal tracheal secretions or tracheal deviation.   Cardiovascular:      Rate and Rhythm: Normal rate and regular rhythm. Occasional Extrasystoles are present.     Pulses: Normal pulses.      Heart sounds: Normal heart sounds. No murmur heard.     No friction rub.   Pulmonary:      Effort: Pulmonary effort is normal. Tachypnea present.      Breath sounds: Decreased air movement present. Examination of the right-upper field reveals decreased breath sounds and wheezing. Examination of the left-upper field reveals decreased breath sounds and wheezing. Examination of the right-middle field reveals decreased breath sounds and wheezing. Examination of the left-middle field reveals decreased breath sounds and wheezing. Examination of the right-lower field reveals decreased breath sounds, wheezing and rhonchi. Examination of the left-lower field reveals decreased breath sounds, wheezing and rhonchi. Decreased breath sounds, wheezing and rhonchi present.   Chest:      Chest wall: Tenderness present. No lacerations, deformity, swelling, crepitus or edema. There is no dullness to percussion.   Abdominal:      General: Abdomen is flat.      Palpations: Abdomen is soft.      Tenderness: There is no abdominal tenderness. There is no right CVA tenderness or left CVA tenderness.      Hernia: No hernia is present.   Musculoskeletal:      Cervical back: Full passive range of motion without pain, normal range of motion and neck  supple. No rigidity. No muscular tenderness.      Right lower leg: No edema.      Left lower leg: No edema.   Lymphadenopathy:      Head:      Right side of head: No submental adenopathy.      Left side of head: No submental adenopathy.      Cervical:      Right cervical: No superficial cervical adenopathy.     Left cervical: No superficial cervical adenopathy.      Upper Body:      Right upper body: No supraclavicular adenopathy.      Left upper body: No supraclavicular adenopathy.   Skin:     General: Skin is warm and dry.      Capillary Refill: Capillary refill takes less than 2 seconds.      Coloration: Skin is not cyanotic or pale.      Findings: No abrasion or bruising.   Neurological:      General: No focal deficit present.      Mental Status: She is alert and oriented to person, place, and time. Mental status is at baseline.      GCS: GCS eye subscore is 4. GCS verbal subscore is 5. GCS motor subscore is 6.      Cranial Nerves: No cranial nerve deficit.      Sensory: No sensory deficit.      Motor: Motor function is intact.      Deep Tendon Reflexes:      Reflex Scores:       Tricep reflexes are 2+ on the right side and 2+ on the left side.       Bicep reflexes are 2+ on the right side and 2+ on the left side.       Brachioradialis reflexes are 2+ on the right side and 2+ on the left side.       Patellar reflexes are 2+ on the right side and 2+ on the left side.       Achilles reflexes are 2+ on the right side and 2+ on the left side.  Psychiatric:         Attention and Perception: Attention and perception normal.         Mood and Affect: Mood normal.         Speech: Speech normal.         Behavior: Behavior normal. Behavior is cooperative.         Thought Content: Thought content normal.         Cognition and Memory: Cognition and memory normal.         Judgment: Judgment normal.         Laboratory:  Recent Labs     02/22/24  1207   WBC 6.2   RBC 4.42   HEMOGLOBIN 14.9   HEMATOCRIT 44.7   .1*   MCH  "33.7*   MCHC 33.3   RDW 50.5*   PLATELETCT 352   MPV 8.8*     Recent Labs     02/22/24  1207   SODIUM 141   POTASSIUM 4.1   CHLORIDE 106   CO2 21   GLUCOSE 97   BUN 14   CREATININE 0.54   CALCIUM 9.3     Recent Labs     02/22/24  1207   ALTSGPT <5   ASTSGOT 10*   ALKPHOSPHAT 114*   TBILIRUBIN 0.2   GLUCOSE 97         No results for input(s): \"NTPROBNP\" in the last 72 hours.      Recent Labs     02/22/24  1207   TROPONINT 7       Imaging:  DX-CHEST-PORTABLE (1 VIEW)   Final Result         1. No acute cardiopulmonary abnormalities are identified.      CT-CTA CHEST PULMONARY ARTERY W/ RECONS    (Results Pending)   NM-CARDIAC STRESS TEST    (Results Pending)   NM-LUNG VENT/PERF IMAGING    (Results Pending)   CT-CHEST (THORAX) W/O    (Results Pending)   EC-ECHOCARDIOGRAM COMPLETE W/ CONT    (Results Pending)       X-Ray:  I have personally reviewed the images and compared with prior images.  EKG:  I have personally reviewed the images and compared with prior images.    Assessment/Plan:  Justification for Admission Status  I anticipate this patient is appropriate for observation status at this time because patient has chest pain at this point will need complete workup which required 23 hours or less.      Patient will need a Telemetry bed on MEDICAL service .  The need is secondary to chest pain.    * Chest pain- (present on admission)  Assessment & Plan  The ASCVD Risk score (Jennifer WEST, et al., 2019) failed to calculate for the following reasons:    Cannot find a previous HDL lab    Cannot find a previous total cholesterol lab  Reports falling on [place]  Since then she has had severe left-sided rib wall pain that has escalated it is now 10 out of 10  Patient has contrast allergy was not able to undergo a CT with contrast  Stat CT of the chest without contrast to evaluate for fractures  VQ scan for pulmonary embolism evaluation  Stat lipase to evaluate for possible pancreatitis with the recent trauma  Serial " troponins  Stress test in the morning  Echocardiogram  Morphine for pain management        COPD (chronic obstructive pulmonary disease) ([facility])  Assessment & Plan  Patient has been smoking 1 pack of cigarettes per day for over 40 years  COPD is pretty severe  Continue RT protocol  Nebulizer treatments  Oxygen as needed to keep oxygen saturations above 90%  Tobacco counseling cessation given    Essential hypertension- (present on admission)  Assessment & Plan  Optimize blood pressure management keep systolic blood pressure less than 140 diastolic under 90    Anxiety- (present on admission)  Assessment & Plan  Chronic anxiety  Anxiolytic management will be utilized  As needed Xanax    Tobacco abuse- (present on admission)  Assessment & Plan  -nicotine replacement protocol and cessation education provided for 12 minutes,discussed options of nicotine patch, acupuncture, medical treatment with wellbutrin and chantix. Discussed other options. Code 61296  Patient has been smoking for over 40 years  Currently not interested in quitting    Chronic pancreatitis ([facility])- (present on admission)  Assessment & Plan  Evaluate lipase level  Patient apparently in the past was using oxycodone and this caused her to have pancreatitis.  Since then she says she has been pretty well-controlled but occasionally she does flareup  Pain management    Chronic back pain- (present on admission)  Assessment & Plan  Pain management as needed        VTE prophylaxis: SCDs/TEDs

## 2024-02-23 ENCOUNTER — APPOINTMENT (OUTPATIENT)
Dept: RADIOLOGY | Facility: MEDICAL CENTER | Age: 69
End: 2024-02-23
Attending: HOSPITALIST
Payer: MEDICARE

## 2024-02-23 ENCOUNTER — APPOINTMENT (OUTPATIENT)
Dept: RADIOLOGY | Facility: MEDICAL CENTER | Age: 69
End: 2024-02-23
Attending: INTERNAL MEDICINE
Payer: MEDICARE

## 2024-02-23 PROBLEM — Z78.9 ALCOHOL USE: Status: ACTIVE | Noted: 2024-02-23

## 2024-02-23 LAB
ANION GAP SERPL CALC-SCNC: 14 MMOL/L (ref 7–16)
BUN SERPL-MCNC: 17 MG/DL (ref 8–22)
CALCIUM SERPL-MCNC: 8.9 MG/DL (ref 8.4–10.2)
CHLORIDE SERPL-SCNC: 105 MMOL/L (ref 96–112)
CHOLEST SERPL-MCNC: 318 MG/DL (ref 100–199)
CO2 SERPL-SCNC: 20 MMOL/L (ref 20–33)
CREAT SERPL-MCNC: 0.61 MG/DL (ref 0.5–1.4)
EKG IMPRESSION: NORMAL
ERYTHROCYTE [DISTWIDTH] IN BLOOD BY AUTOMATED COUNT: 52.3 FL (ref 35.9–50)
GFR SERPLBLD CREATININE-BSD FMLA CKD-EPI: 96 ML/MIN/1.73 M 2
GLUCOSE SERPL-MCNC: 119 MG/DL (ref 65–99)
HCT VFR BLD AUTO: 41.6 % (ref 37–47)
HDLC SERPL-MCNC: 44 MG/DL
HGB BLD-MCNC: 13.5 G/DL (ref 12–16)
LDLC SERPL CALC-MCNC: ABNORMAL MG/DL
MCH RBC QN AUTO: 33.7 PG (ref 27–33)
MCHC RBC AUTO-ENTMCNC: 32.5 G/DL (ref 32.2–35.5)
MCV RBC AUTO: 103.7 FL (ref 81.4–97.8)
PLATELET # BLD AUTO: 307 K/UL (ref 164–446)
PMV BLD AUTO: 9.2 FL (ref 9–12.9)
POTASSIUM SERPL-SCNC: 3.8 MMOL/L (ref 3.6–5.5)
RBC # BLD AUTO: 4.01 M/UL (ref 4.2–5.4)
SODIUM SERPL-SCNC: 139 MMOL/L (ref 135–145)
TRIGL SERPL-MCNC: 679 MG/DL (ref 0–149)
TROPONIN T SERPL-MCNC: 8 NG/L (ref 6–19)
WBC # BLD AUTO: 6.2 K/UL (ref 4.8–10.8)

## 2024-02-23 PROCEDURE — 99232 SBSQ HOSP IP/OBS MODERATE 35: CPT | Performed by: INTERNAL MEDICINE

## 2024-02-23 PROCEDURE — 96376 TX/PRO/DX INJ SAME DRUG ADON: CPT | Mod: XU

## 2024-02-23 PROCEDURE — 700102 HCHG RX REV CODE 250 W/ 637 OVERRIDE(OP): Performed by: HOSPITALIST

## 2024-02-23 PROCEDURE — 700111 HCHG RX REV CODE 636 W/ 250 OVERRIDE (IP): Performed by: HOSPITALIST

## 2024-02-23 PROCEDURE — 94760 N-INVAS EAR/PLS OXIMETRY 1: CPT

## 2024-02-23 PROCEDURE — 85027 COMPLETE CBC AUTOMATED: CPT

## 2024-02-23 PROCEDURE — 93010 ELECTROCARDIOGRAM REPORT: CPT | Performed by: INTERNAL MEDICINE

## 2024-02-23 PROCEDURE — 700101 HCHG RX REV CODE 250: Performed by: INTERNAL MEDICINE

## 2024-02-23 PROCEDURE — 36415 COLL VENOUS BLD VENIPUNCTURE: CPT

## 2024-02-23 PROCEDURE — G0378 HOSPITAL OBSERVATION PER HR: HCPCS

## 2024-02-23 PROCEDURE — 97161 PT EVAL LOW COMPLEX 20 MIN: CPT

## 2024-02-23 PROCEDURE — 84484 ASSAY OF TROPONIN QUANT: CPT

## 2024-02-23 PROCEDURE — 99407 BEHAV CHNG SMOKING > 10 MIN: CPT

## 2024-02-23 PROCEDURE — A9270 NON-COVERED ITEM OR SERVICE: HCPCS | Performed by: HOSPITALIST

## 2024-02-23 PROCEDURE — 96372 THER/PROPH/DIAG INJ SC/IM: CPT | Mod: XU

## 2024-02-23 PROCEDURE — 97165 OT EVAL LOW COMPLEX 30 MIN: CPT

## 2024-02-23 PROCEDURE — 97535 SELF CARE MNGMENT TRAINING: CPT

## 2024-02-23 PROCEDURE — 80061 LIPID PANEL: CPT

## 2024-02-23 PROCEDURE — A9540 TC99M MAA: HCPCS

## 2024-02-23 PROCEDURE — 80048 BASIC METABOLIC PNL TOTAL CA: CPT

## 2024-02-23 PROCEDURE — 96375 TX/PRO/DX INJ NEW DRUG ADDON: CPT | Mod: XU

## 2024-02-23 PROCEDURE — 94664 DEMO&/EVAL PT USE INHALER: CPT

## 2024-02-23 RX ORDER — LIDOCAINE 50 MG/G
1 PATCH TOPICAL EVERY 24 HOURS
Status: DISCONTINUED | OUTPATIENT
Start: 2024-02-23 | End: 2024-02-26 | Stop reason: HOSPADM

## 2024-02-23 RX ADMIN — ONDANSETRON 4 MG: 2 INJECTION INTRAMUSCULAR; INTRAVENOUS at 02:25

## 2024-02-23 RX ADMIN — OXYCODONE HYDROCHLORIDE 5 MG: 5 TABLET ORAL at 22:52

## 2024-02-23 RX ADMIN — OXYCODONE HYDROCHLORIDE 5 MG: 5 TABLET ORAL at 17:53

## 2024-02-23 RX ADMIN — ENOXAPARIN SODIUM 40 MG: 100 INJECTION SUBCUTANEOUS at 17:53

## 2024-02-23 RX ADMIN — ACETAMINOPHEN 650 MG: 325 TABLET ORAL at 09:05

## 2024-02-23 RX ADMIN — LISINOPRIL 10 MG: 5 TABLET ORAL at 17:53

## 2024-02-23 RX ADMIN — FLUTICASONE FUROATE, UMECLIDINIUM BROMIDE AND VILANTEROL TRIFENATATE 1 PUFF: 200; 62.5; 25 POWDER RESPIRATORY (INHALATION) at 09:05

## 2024-02-23 RX ADMIN — LISINOPRIL 10 MG: 5 TABLET ORAL at 05:06

## 2024-02-23 RX ADMIN — AMLODIPINE BESYLATE 5 MG: 5 TABLET ORAL at 05:06

## 2024-02-23 RX ADMIN — OXYCODONE HYDROCHLORIDE 5 MG: 5 TABLET ORAL at 13:47

## 2024-02-23 RX ADMIN — OXYCODONE HYDROCHLORIDE 5 MG: 5 TABLET ORAL at 09:51

## 2024-02-23 RX ADMIN — OXYCODONE HYDROCHLORIDE 5 MG: 5 TABLET ORAL at 00:48

## 2024-02-23 RX ADMIN — ONDANSETRON 4 MG: 4 TABLET, ORALLY DISINTEGRATING ORAL at 06:23

## 2024-02-23 RX ADMIN — MORPHINE SULFATE 2 MG: 4 INJECTION, SOLUTION INTRAMUSCULAR; INTRAVENOUS at 20:31

## 2024-02-23 RX ADMIN — ASPIRIN 325 MG: 325 TABLET ORAL at 05:06

## 2024-02-23 RX ADMIN — NICOTINE 21 MG: 21 PATCH, EXTENDED RELEASE TRANSDERMAL at 05:07

## 2024-02-23 RX ADMIN — ONDANSETRON 4 MG: 2 INJECTION INTRAMUSCULAR; INTRAVENOUS at 17:53

## 2024-02-23 RX ADMIN — MORPHINE SULFATE 2 MG: 4 INJECTION, SOLUTION INTRAMUSCULAR; INTRAVENOUS at 01:56

## 2024-02-23 RX ADMIN — OXYCODONE HYDROCHLORIDE 5 MG: 5 TABLET ORAL at 06:23

## 2024-02-23 RX ADMIN — LIDOCAINE 1 PATCH: 50 PATCH TOPICAL at 11:43

## 2024-02-23 ASSESSMENT — ENCOUNTER SYMPTOMS
PHOTOPHOBIA: 0
DEPRESSION: 0
CHILLS: 0
CLAUDICATION: 0
HEADACHES: 0
DIZZINESS: 0
BLURRED VISION: 0
CONSTIPATION: 0
FEVER: 0
MYALGIAS: 0
INSOMNIA: 0
COUGH: 0
HEARTBURN: 0
SENSORY CHANGE: 0
NERVOUS/ANXIOUS: 0
ABDOMINAL PAIN: 0
WEAKNESS: 0
SPEECH CHANGE: 0
DIARRHEA: 0
SHORTNESS OF BREATH: 0
VOMITING: 0

## 2024-02-23 ASSESSMENT — COGNITIVE AND FUNCTIONAL STATUS - GENERAL
SUGGESTED CMS G CODE MODIFIER DAILY ACTIVITY: CH
DAILY ACTIVITIY SCORE: 24

## 2024-02-23 ASSESSMENT — LIFESTYLE VARIABLES
TOTAL SCORE: 0
ALCOHOL_USE: YES
HAVE PEOPLE ANNOYED YOU BY CRITICIZING YOUR DRINKING: NO
TOTAL SCORE: 0
AVERAGE NUMBER OF DAYS PER WEEK YOU HAVE A DRINK CONTAINING ALCOHOL: 5
EVER FELT BAD OR GUILTY ABOUT YOUR DRINKING: NO
TOTAL SCORE: 0
ON A TYPICAL DAY WHEN YOU DRINK ALCOHOL HOW MANY DRINKS DO YOU HAVE: 2
CONSUMPTION TOTAL: POSITIVE
HAVE YOU EVER FELT YOU SHOULD CUT DOWN ON YOUR DRINKING: NO
EVER HAD A DRINK FIRST THING IN THE MORNING TO STEADY YOUR NERVES TO GET RID OF A HANGOVER: NO
HOW MANY TIMES IN THE PAST YEAR HAVE YOU HAD 5 OR MORE DRINKS IN A DAY: 0

## 2024-02-23 ASSESSMENT — GAIT ASSESSMENTS
ASSISTIVE DEVICE: FRONT WHEEL WALKER
DISTANCE (FEET): 200
GAIT LEVEL OF ASSIST: SUPERVISED
DEVIATION: STEP TO

## 2024-02-23 ASSESSMENT — PAIN DESCRIPTION - PAIN TYPE
TYPE: ACUTE PAIN

## 2024-02-23 ASSESSMENT — PATIENT HEALTH QUESTIONNAIRE - PHQ9
1. LITTLE INTEREST OR PLEASURE IN DOING THINGS: NOT AT ALL
2. FEELING DOWN, DEPRESSED, IRRITABLE, OR HOPELESS: NOT AT ALL
SUM OF ALL RESPONSES TO PHQ9 QUESTIONS 1 AND 2: 0

## 2024-02-23 ASSESSMENT — FIBROSIS 4 INDEX: FIB4 SCORE: 1.07

## 2024-02-23 ASSESSMENT — ACTIVITIES OF DAILY LIVING (ADL): TOILETING: INDEPENDENT

## 2024-02-23 NOTE — ASSESSMENT & PLAN NOTE
Patient with elevated alcohol level on admission  No signs of withdrawal  Patient states she was self-medicating for the pain and has no prior history of alcohol withdrawal  If she starts to show signs of alcohol withdrawal, will activate CIWA protocol

## 2024-02-23 NOTE — PROGRESS NOTES
Hospital Medicine Daily Progress Note    Date of Service  2/23/2024    Chief Complaint  Antonieta Mcdonald is a 68 y.o. female admitted 2/22/2024 with chest pain    Hospital Course  Is a 68-year-old female who came in with complaints of left-sided chest pain she had a fall on [place] Sunday and hit on the left side since then she has been having increasing chest pain but also in the substance sternal area as well.  D-dimer was elevated and CT of the chest showed 5 and 6 left rib fractures.  VQ scan was done secondary to anaphylactic reaction to contrast and showed no evidence of pulmonary embolus.  Unfortunately stress test needs to be deferred by 48 hours after VQ scan for accuracy of the test.  Patient continues to have significant pain and given her risk factors for cardiac etiology of her substernal chest pain she will be monitored until stress test can be completed.    Interval Problem Update  2/23 patient still with significant chest pain especially with deep inspiration.  Reproducible with palpation on the left but she also has substernal chest pain which she does not attribute to her fall.  PE negative on VQ scan, echocardiogram done with preserved ejection fraction.  48 hours needed between VQ scan and stress test therefore this will be done on Sunday.  If patient's pain significantly improves then she has the option of doing an outpatient stress test and this will be again addressed tomorrow.    I have discussed this patient's plan of care and discharge plan at IDT rounds today with Case Management, Nursing, Nursing leadership, and other members of the IDT team.    Consultants/Specialty  none    Code Status  Full Code    Disposition  The patient is not medically cleared for discharge to home or a post-acute facility.  Anticipate discharge to: home with close outpatient follow-up    I have placed the appropriate orders for post-discharge needs.    Review of Systems  Review of Systems    Constitutional:  Negative for chills and fever.   HENT:  Negative for congestion.    Eyes:  Negative for blurred vision and photophobia.   Respiratory:  Negative for cough and shortness of breath.    Cardiovascular:  Positive for chest pain (Left chest and substernal). Negative for claudication and leg swelling.   Gastrointestinal:  Negative for abdominal pain, constipation, diarrhea, heartburn and vomiting.   Genitourinary:  Negative for dysuria and hematuria.   Musculoskeletal:  Negative for joint pain and myalgias.   Skin:  Negative for itching and rash.   Neurological:  Negative for dizziness, sensory change, speech change, weakness and headaches.   Psychiatric/Behavioral:  Negative for depression. The patient is not nervous/anxious and does not have insomnia.         Physical Exam  Temp:  [36.2 °C (97.2 °F)-37.1 °C (98.8 °F)] 37.1 °C (98.8 °F)  Pulse:  [79-90] 90  Resp:  [16-20] 16  BP: (114-179)/() 179/90  SpO2:  [89 %-94 %] 94 %    Physical Exam  Vitals and nursing note reviewed.   Constitutional:       General: She is not in acute distress.     Appearance: Normal appearance. She is not ill-appearing.   HENT:      Head: Normocephalic and atraumatic.      Nose: Nose normal.   Cardiovascular:      Rate and Rhythm: Normal rate and regular rhythm.      Heart sounds: Normal heart sounds. No murmur heard.  Pulmonary:      Effort: Pulmonary effort is normal.      Breath sounds: Normal breath sounds.   Chest:      Chest wall: Tenderness present.   Abdominal:      General: Bowel sounds are normal. There is no distension.      Palpations: Abdomen is soft.   Musculoskeletal:         General: No swelling or tenderness.      Cervical back: Neck supple.   Skin:     General: Skin is warm and dry.   Neurological:      General: No focal deficit present.      Mental Status: She is alert and oriented to person, place, and time.   Psychiatric:         Mood and Affect: Mood normal.         Fluids  No intake or output data  in the 24 hours ending 02/23/24 1346    Laboratory  Recent Labs     02/22/24  1207 02/23/24  0248   WBC 6.2 6.2   RBC 4.42 4.01*   HEMOGLOBIN 14.9 13.5   HEMATOCRIT 44.7 41.6   .1* 103.7*   MCH 33.7* 33.7*   MCHC 33.3 32.5   RDW 50.5* 52.3*   PLATELETCT 352 307   MPV 8.8* 9.2     Recent Labs     02/22/24  1207 02/23/24  0248   SODIUM 141 139   POTASSIUM 4.1 3.8   CHLORIDE 106 105   CO2 21 20   GLUCOSE 97 119*   BUN 14 17   CREATININE 0.54 0.61   CALCIUM 9.3 8.9             Recent Labs     02/23/24  0248   TRIGLYCERIDE 679*   HDL 44   LDL see below       Imaging  NM-LUNG VENT/PERF IMAGING   Final Result      Low probability of pulmonary embolus by PIOPED 2 criteria      EC-ECHOCARDIOGRAM COMPLETE W/O CONT   Final Result      CT-CHEST (THORAX) W/O   Final Result         1. Acute left anterior fifth and sixth rib fractures.      2. Several nodules in the right lung, the largest measures 1.1 x 2.1 cm. These are nonspecific but could be metastasis.      3. No sternal fracture identified.      DX-CHEST-PORTABLE (1 VIEW)   Final Result         1. No acute cardiopulmonary abnormalities are identified.      NM-CARDIAC STRESS TEST    (Results Pending)        Assessment/Plan  * Chest pain- (present on admission)  Assessment & Plan  The ASCVD Risk score (Jennifer DK, et al., 2019) failed to calculate for the following reasons:    Cannot find a previous HDL lab    Cannot find a previous total cholesterol lab  Reports falling on [place] Sunday  Patient has contrast allergy was not able to undergo a CT with contrast  CT of the chest confirms fractures on the left ribs 5 and 6  VQ scan shows no evidence of pulmonary embolus  Stress test 48 hours from VQ scan   Echocardiogram shows preserved ejection fraction of 60% without significant valvular abnormalities  Morphine for pain management        Alcohol use  Assessment & Plan  Patient with elevated alcohol level on admission  Patient states she was self-medicating for the  pain and has no prior history of alcohol withdrawal  If she starts to show signs of alcohol withdrawal, will activate CIWA protocol    COPD (chronic obstructive pulmonary disease) (HCC)  Assessment & Plan  Patient has been smoking 1 pack of cigarettes per day for over 40 years  COPD is pretty severe  Continue RT protocol  Nebulizer treatments  Oxygen as needed to keep oxygen saturations above 90%  Tobacco counseling cessation given    Essential hypertension- (present on admission)  Assessment & Plan  Optimize blood pressure management keep systolic blood pressure less than 140 diastolic under 90    Anxiety- (present on admission)  Assessment & Plan  Chronic anxiety  Anxiolytic management will be utilized  As needed Xanax    Tobacco abuse- (present on admission)  Assessment & Plan  -nicotine replacement protocol and cessation education provided   Patient has been smoking for over 40 years  Currently not interested in quitting    Chronic pancreatitis (HCC)- (present on admission)  Assessment & Plan  Evaluate lipase level  Patient apparently in the past was using oxycodone and this caused her to have pancreatitis.  Since then she says she has been pretty well-controlled but occasionally she does flareup  Pain management    Chronic back pain- (present on admission)  Assessment & Plan  Pain management as needed         VTE prophylaxis:   SCDs/TEDs      I have performed a physical exam and reviewed and updated ROS and Plan today (2/23/2024). In review of yesterday's note (2/22/2024), there are no changes except as documented above.

## 2024-02-23 NOTE — THERAPY
Occupational Therapy   Initial Evaluation     Patient Name: Antonieta Mcdonald  Age:  68 y.o., Sex:  female  Medical Record #: [id number]  Today's Date: 2/23/2024       Precautions: Fall Risk, Other (See Comments)  Comments:  c/o dizziness elevated BP    Assessment  Patient is 68 y.o. female admitted for chest pain. PMhx: GLF ~2weeks ago, alcohol abuse, angina, arthritis, CA, COPD, HTN, macular degeneration, pain and panic attack. Pt reports being independent and providing care to her SO w/dementia.   This admission pt is dx w/chest pain, +chest imaging for subacute rib fx 5th and 6th on L, pending additional cardiac w/o, COPD and anxiety.   At this time pt demonstrated ability to complete ADL's and functional txfs w/o assist. Pt did have c/o dizziness through out session. Pt had no LOB, BP was elevated but stable and no observed nystagmus. Discussed home safety and HH pt declined concerns or need for HH.   At this time anticipate no further acute OT needs.     Plan  Occupational Therapy Initial Treatment Plan   Duration: (P) Discharge Needs Only    DC Equipment Recommendations:  None  Discharge Recommendations: Anticipate that the patient will have no further occupational therapy needs after discharge from the hospital     Subjective  Agreeable to therapy      Objective     02/23/24 1325   Charge Group   OT Evaluation OT Evaluation Low   OT Self Care / ADL (Units) 1   Total Time Spent   OT Time Spent Yes   OT Self Care / ADL (Minutes) 15   OT Evaluation (Minutes) 10   Initial Contact Note    Initial Contact Note Order Received and Verified, Evaluation Only - Patient Does Not Require Further Acute Occupational Therapy at this Time.  However, May Benefit from Post Acute Therapy for Higher Level Functional Deficits.   Prior Living Situation   Prior Services Home-Independent   Housing / Facility 1 Story House   Steps Into Home 5   Steps In Home 0   Bathroom Set up Bathtub / Shower Combination   Equipment Owned None    Lives with - Patient's Self Care Capacity Spouse   Comments Pt reports SO has dementia but is still able to perform his ADL's (when he wants to) does report SO falls frequently and that she is his caretaker w/regards to IADL's although per her report SO still drives   Prior Level of ADL Function   Self Feeding Independent   Grooming / Hygiene Independent   Bathing Independent   Dressing Independent   Toileting Independent   Prior Level of IADL Function   Medication Management Independent   Laundry Independent   Kitchen Mobility Independent   Finances Independent   Home Management Independent   Shopping Independent   Prior Level Of Mobility Independent Without Device in Community   History of Falls   History of Falls Yes   Date of Last Fall 02/11/24   Precautions   Precautions Fall Risk;Other (See Comments)   Comments c/o dizziness elevated BP   Vitals   Patient BP Position Standing 1 minute   Blood Pressure  (!) 179/90   Pain 0 - 10 Group   Location Rib Cage   Location Orientation Left   Therapist Pain Assessment During Activity;Nurse Notified;4   Cognition    Cognition / Consciousness WDL   Level of Consciousness Alert   Comments cooperative does have an anxious affect   Passive ROM Upper Body   Passive ROM Upper Body WDL   Active ROM Upper Body   Active ROM Upper Body  WDL   Strength Upper Body   Upper Body Strength  WDL   Sensation Upper Body   Upper Extremity Sensation  WDL   Upper Body Muscle Tone   Upper Body Muscle Tone  WDL   Neurological Concerns   Neurological Concerns No   Coordination Upper Body   Coordination WDL   Balance Assessment   Sitting Balance (Static) Good   Sitting Balance (Dynamic) Fair +   Standing Balance (Static) Fair   Standing Balance (Dynamic) Fair   Weight Shift Sitting Fair   Weight Shift Standing Fair   Comments w/fww and then w/o   Bed Mobility    Supine to Sit Supervised   Sit to Supine Supervised   Scooting Supervised   ADL Assessment   Grooming Supervision;Standing   Upper Body  Dressing Supervision   Lower Body Dressing Supervision   Toileting Supervision   Comments reveiwed fall prevention and shower safety as well as encourging vision check   How much help from another person does the patient currently need...   6 Clicks Daily Activity Score 24   Functional Mobility   Sit to Stand Supervised   Bed, Chair, Wheelchair Transfer Supervised   Toilet Transfers Supervised   Mobility walking in room w/fww then W/o   Visual Perception   Visual Perception  X   Comments c/o dizziness through out session; impaired smooth pursuits hx of cataracts   Activity Tolerance   Comments mild c/o fatigue   Patient / Family Goals   Patient / Family Goal #1 to go home   Short Term Goals   Short Term Goal # 1 pt will have no further acute OT needs   Education Group   Education Provided Home Safety;Role of Occupational Therapist;Activities of Daily Living   Role of Occupational Therapist Patient Response Patient;Acceptance;Explanation;Demonstration   Home Safety Patient Response Patient;Acceptance;Explanation;Demonstration   ADL Patient Response Patient;Acceptance;Explanation;Demonstration   Occupational Therapy Initial Treatment Plan    Duration Discharge Needs Only   Problem List   Problem List None   Anticipated Discharge Equipment and Recommendations   DC Equipment Recommendations None   Discharge Recommendations Anticipate that the patient will have no further occupational therapy needs after discharge from the hospital   Interdisciplinary Plan of Care Collaboration   IDT Collaboration with  Nursing   Patient Position at End of Therapy In Bed;Call Light within Reach;Tray Table within Reach;Phone within Reach   Collaboration Comments RN aware of OT eval and pts efforts   Session Information   Date / Session Number  2/23 #1 eval only  (dc needs)

## 2024-02-23 NOTE — RESPIRATORY CARE
"   EDUCATION by COPD CLINICAL EDUCATOR  2/23/2024 at 12:42 PM by Candida Britt, RRT     Patient interviewed by education team. Patient does not have a history or diagnosis of COPD. Patient is a smoker.  Therefore, smoking cessation education done. Smoking Cessation Intervention and education completed, 20 minutes spent on smoking cessation education with patient.  Provided smoking cessation packet with \"Tips to Quit\" and brochure for \"Free Smoking Cessation Classes\".     Patient declined or is unable to participate in the full program.  Therefore, a short intervention has been conducted.  A comprehensive packet including information about COPD, types of treatments to manage their disease and safe home Oxygen usage was provided and reviewed with patient at the bedside.      COPD Screen  COPD Risk Screening  Do you have a history of COPD?: Yes  Do you have a Pulmonologist?: Yes    COPD Assessment  COPD Clinical Specialists ONLY  COPD Education Initiated: Yes--Short Intervention (Pt goes to [facility], states just had a PFT in his office, is on Trelegy, currently still smokes, Smoking Cessation Educaiton provided as well as COPD, spacer and action plan, admit for Rib fractures gave IS)  Is this a COPD exacerbation patient?: No  DME Company: [employer]  Pulmonologist Name: [facility]  Referrals Initiated: Yes  Pulmonary Rehab: N/A  Smoking Cessation: Yes  Smoking Pack Years: 40+yrs  Hospice: N/A  Home Health Care: N/A  Mobile Urgent Care Services: N/A (gave info)  [facility] Group: N/A  Private In-Home Care Agency: N/A  $ Demo/Eval of SVN's, MDI's and Aerosols: Yes  Interdisciplinary Rounds: Attendance at Rounds (30 Min)    PFT Results    No results found for: \"PFT\"    Meds to [facility] provides bedside medication delivery for all eligible patients at discharge.  Would you like to opt out of this program for any reason?: Yes - Opt Out  Reason the patient would like to opt out of Bedside Medication Delivery at " "Discharge?: Patient prefers another pharmacy     MY COPD ACTION PLAN     It is recommended that patients and physicians /healthcare providers complete this action plan together. This plan should be discussed at each physician visit and updated as needed.    The green, yellow and red zones show groups of symptoms of COPD. This list of symptoms is not comprehensive, and you may experience other symptoms. In the \"Actions\" column, your healthcare provider has recommended actions for you to take based on your symptoms.    Patient Name: Antonieta Mcdonald   YOB: 1955   Last Updated on: 2/23/2024 12:41 PM   Green Zone:  I am doing well today Actions     Usual activitiy and exercise level   Take daily medications     Usual amounts of cough and phlegm/mucus   Use oxygen as prescribed     Sleep well at night   Continue regular exercise/diet plan     Appetite is good   At all times avoid cigarette smoke, inhaled irritants     Daily Medications (these medications are taken every day):   Fluticasone and Umeclidinium and Vilanterol (Trelogy)  Albuterol (Accuneb, Proair, Proventil, Ventolin)  Albuterol (Accuneb, Proair, Proventil, Ventolin) 1 Puff  3mL via nebulizer  2 Puffs Once daily  Four Times daily  Every 4 hours PRN     Additional Information:  Use spacer with Rescue Inhaler!  Can use nebulizer 1 up to 4 times daily if needed or before activity to prevent airway from closing     Patient instructed on importance of cleaning home nebulizer after every treatment to prevent infection.      Yellow Zone:  I am having a bad day or a COPD flare Actions     More breathless than usual   Continue daily medications     I have less energy for my daily activities   Use quick relief inhaler as ordered     Increased or thicker phlegm/mucus   Use oxygen as prescribed     Using quick relief inhaler/nebulizer more often   Get plenty of rest     Swelling of ankles more than usual   Use pursed lip breathing     More coughing " "than usual   At all times avoid cigarette smoke, inhaled irritants     I feel like I have a \"chest cold\"     Poor sleep and my symptoms woke me up     My appetite is not good     My medicine is not helping      Call provider immediately if symptoms don’t improve     Continue daily medications, add rescue medications:   Albuterol 3mL via nebulizer Every 4 hours       Medications to be used during a flare up, (as Discussed with Provider):           Additional Information:  Call provider if symptoms do not improve!      Red Zone:  I need urgent medical care Actions     Severe shortness of breath even at rest   Call 911 or seek medical care immediately     Not able to do any activity because of breathing      Fever or shaking chills      Feeling confused or very drowsy       Chest pains      Coughing up blood                  "

## 2024-02-23 NOTE — CARE PLAN
The patient is Stable - Low risk of patient condition declining or worsening    Shift Goals  Clinical Goals: VQ scan, stress test, PT/OT evals  Patient Goals: Rest    Progress made toward(s) clinical / shift goals:    Problem: Psychosocial  Goal: Patient's level of anxiety will decrease  Description: Target End Date:  1-3 days or as soon as patient condition allows    1.  Collaborate with patient and family/caregiver to identify triggers and develop strategies to cope with anxiety  2.  Implement stimuli reduction, calming techniques  3.  Pharmacologic management per provider order  4.  Encourage patient/family/care giver participation  5.  Collaborate with interdisciplinary team including Psychologist or Behavioral Health Team as needed  Outcome: Progressing  Goal: Patient's ability to verbalize feelings about condition will improve  Description: Target End Date:  Prior to discharge or change in level of care    1.  Discuss coping with medical condition and its effects  2.  Encourage patient participation in care  3.  Encourage acknowledgement of body changes and accompanying emotions  4.  Perform depression screening  Outcome: Progressing     Problem: Respiratory  Goal: Patient will achieve/maintain optimum respiratory ventilation and gas exchange  Description: Target End Date:  Prior to discharge or change in level of care    Document on Assessment flowsheet    1.  Assess and monitor rate, rhythm, depth and effort of respiration  2.  Breath sounds assessed qshift and/or as needed  3.  Assess O2 saturation, administer/titrate oxygen as ordered  4.  Position patient for maximum ventilatory efficiency  5.  Turn, cough, and deep breath with splinting to improve effectiveness  6.  Collaborate with RT to administer medication/treatments per order  7.  Encourage use of incentive spirometer and encourage patient to cough after use and utilize splinting techniques if applicable  8.  Airway suctioning  9.  Monitor sputum  production for changes in color, consistency and frequency  10. Perform frequent oral hygiene  11. Alternate physical activity with rest periods  Outcome: Progressing     Problem: Knowledge Deficit - COPD  Goal: Patient/significant other demonstrates understanding of disease process, utilization of the Action Plan, medications and discharge instruction  Description: Target End Date:  1-3 days or as soon as patient condition allows    Document in Patient Education    1.  Discuss the importance of medical follow-up care, periodic chest x-rays, sputum cultures  2.  Review worsening signs and symptoms of COPD flare and exacerbation and its management  3.  Discuss respiratory medications, side effects, adverse reactions  4.  Demonstrate technique for using a metered-dose inhaler (MDI) and utilization of a spacer  5.  Review the COPD Action Plan  6.  Instruct and reinforce the rationale for breathing exercises, coughing effectively, and general conditioning exercises  7.  Stress importance of oral care and dental hygiene  8.  Discuss the importance of avoiding people with active respiratory infections and need for routine influenza and pneumococcal vaccinations  9.  Discuss factors that may trigger condition and encourage patient/significant other to explore ways to control these factors in and around the home and work setting  10. Review the harmful effects of smoking and advise cessation of smoking  11. Provide information about activity limitations and alternating activities with rest periods to prevent fatigue  12. Instruct asthmatic patient of use of peak flow meter, as appropriate  13. Review oxygen requirements and dosage, safe use of oxygen, and refer to the supplier as indicated  14. Educate patient/family/caregiver on the use of Nasal Intermittent Positive Pressure Ventilation (NIPPV) and possible adverse reactions  Outcome: Progressing

## 2024-02-23 NOTE — HOSPITAL COURSE
Is a 68-year-old female who came in with complaints of left-sided chest pain she had a fall on [place] Sunday and hit on the left side since then she has been having increasing chest pain but also in the substance sternal area as well.  D-dimer was elevated and CT of the chest showed 5 and 6 left rib fractures.  VQ scan was done secondary to anaphylactic reaction to contrast and showed no evidence of pulmonary embolus.  Unfortunately stress test needs to be deferred by 48 hours after VQ scan for accuracy of the test.  Patient continues to have significant pain and given her risk factors for cardiac etiology of her substernal chest pain she will be monitored until stress test can be completed.

## 2024-02-23 NOTE — PROGRESS NOTES
Charge Nurse Rounding Note     Bedside rounding completed to address quality of care and overall patient experience.     Patient Satisfaction addressed including staff responsiveness. Patient/family are aware of the POC and any questions answered. Thorough safety education completed including use of call light prior to all mobility throughout the entirety of the hospital stay.      Patient/family aware of time of next Hourly Round.     No further questions/concerns currently.   \

## 2024-02-23 NOTE — PROGRESS NOTES
12-hour chart check complete.    Monitor Summary  Rhythm: SR  Rate: 80-97  Ectopy: rPVC  Measurements: .16/.08/.38

## 2024-02-23 NOTE — THERAPY
Physical Therapy   Initial Evaluation     Patient Name: Antonieta Mcdonald  Age:  68 y.o., Sex:  female  Medical Record #: [id number]  Today's Date: 2/23/2024     Precautions  Precautions: (P) Fall Risk  Comments: (P) low    Assessment  Patient is 68 y.o. female with a diagnosis of FX L ribs following fall.Pt lives at home with ,she is the caregiver.Pt is safe with bed mob,transfers and ambulation with a fww.Pt needs a FWW for home     Plan    DC Equipment Recommendations: (P) Front-Wheel Walker  Discharge Recommendations: (P) Anticipate that the patient will have no further physical therapy needs after discharge from the hospital        Objective       02/23/24 1400   Charge Group   PT Evaluation PT Evaluation Low   Total Time Spent   PT Evaluation Time Spent (Mins) 30   Initial Contact Note    Initial Contact Note Order Received and Verified, Physical Therapy Evaluation in Progress with Full Report to Follow.   Precautions   Precautions Fall Risk   Comments low   Prior Living Situation   Prior Services Home-Independent   Housing / Facility 1 Story House   Steps Into Home 5   Steps In Home 0   Equipment Owned None   Lives with - Patient's Self Care Capacity Spouse   Prior Level of Functional Mobility   Bed Mobility Independent   Transfer Status Independent   Ambulation Independent   Ambulation Distance community   Assistive Devices Used None   Stairs Independent   Passive ROM Lower Body   Passive ROM Lower Body WDL   Active ROM Lower Body    Active ROM Lower Body  WDL   Strength Lower Body   Lower Body Strength  WDL   Sensation Lower Body   Comments neuropathy R foot   Coordination Lower Body    Coordination Lower Body  WDL   Balance Assessment   Sitting Balance (Static) Good   Sitting Balance (Dynamic) Good   Standing Balance (Static) Fair +   Standing Balance (Dynamic) Fair +   Weight Shift Sitting Good   Weight Shift Standing Good   Bed Mobility    Supine to Sit Modified Independent   Sit to Supine  Modified Independent   Scooting Modified Independent   Gait Analysis   Gait Level Of Assist Supervised   Assistive Device Front Wheel Walker   Distance (Feet) 200   # of Times Distance was Traveled 1   Deviation Step To   Weight Bearing Status full   Functional Mobility   Sit to Stand Supervised   Bed, Chair, Wheelchair Transfer Supervised   Toilet Transfers Supervised   Activity Tolerance   Sitting Edge of Bed 10   Standing 10   Patient / Family Goals    Patient / Family Goal #1 Home   Anticipated Discharge Equipment and Recommendations   DC Equipment Recommendations Front-Wheel Walker   Discharge Recommendations Anticipate that the patient will have no further physical therapy needs after discharge from the hospital   Interdisciplinary Plan of Care Collaboration   IDT Collaboration with  Nursing   Session Information   Date / Session Number  2/23   Priority 0

## 2024-02-23 NOTE — PROGRESS NOTES
4 Eyes Skin Assessment Completed by MANN Durant and MANN Colon.    Head WDL  Ears WDL  Nose WDL  Mouth WDL  Neck WDL  Breast/Chest WDL  Shoulder Blades WDL  Spine WDL  (R) Arm/Elbow/Hand WDL  (L) Arm/Elbow/Hand WDL  Abdomen WDL  Groin WDL  Scrotum/Coccyx/Buttocks WDL  (R) Leg WDL  (L) Leg WDL  (R) Heel/Foot/Toe WDL  (L) Heel/Foot/Toe WDL          Devices In Places, None.      Interventions In Place N/A    Possible Skin Injury No    Pictures Uploaded Into Epic N/A  Wound Consult Placed N/A  RN Wound Prevention Protocol Ordered No

## 2024-02-24 LAB
ANION GAP SERPL CALC-SCNC: 10 MMOL/L (ref 7–16)
BUN SERPL-MCNC: 16 MG/DL (ref 8–22)
CALCIUM SERPL-MCNC: 9 MG/DL (ref 8.4–10.2)
CHLORIDE SERPL-SCNC: 102 MMOL/L (ref 96–112)
CO2 SERPL-SCNC: 23 MMOL/L (ref 20–33)
CREAT SERPL-MCNC: 0.62 MG/DL (ref 0.5–1.4)
ERYTHROCYTE [DISTWIDTH] IN BLOOD BY AUTOMATED COUNT: 51.6 FL (ref 35.9–50)
GFR SERPLBLD CREATININE-BSD FMLA CKD-EPI: 95 ML/MIN/1.73 M 2
GLUCOSE SERPL-MCNC: 111 MG/DL (ref 65–99)
HCT VFR BLD AUTO: 40.7 % (ref 37–47)
HGB BLD-MCNC: 13.3 G/DL (ref 12–16)
MCH RBC QN AUTO: 34.2 PG (ref 27–33)
MCHC RBC AUTO-ENTMCNC: 32.7 G/DL (ref 32.2–35.5)
MCV RBC AUTO: 104.6 FL (ref 81.4–97.8)
PLATELET # BLD AUTO: 268 K/UL (ref 164–446)
PMV BLD AUTO: 9.1 FL (ref 9–12.9)
POTASSIUM SERPL-SCNC: 4.5 MMOL/L (ref 3.6–5.5)
RBC # BLD AUTO: 3.89 M/UL (ref 4.2–5.4)
SODIUM SERPL-SCNC: 135 MMOL/L (ref 135–145)
WBC # BLD AUTO: 5.7 K/UL (ref 4.8–10.8)

## 2024-02-24 PROCEDURE — 36415 COLL VENOUS BLD VENIPUNCTURE: CPT

## 2024-02-24 PROCEDURE — 700102 HCHG RX REV CODE 250 W/ 637 OVERRIDE(OP): Performed by: HOSPITALIST

## 2024-02-24 PROCEDURE — 96372 THER/PROPH/DIAG INJ SC/IM: CPT | Mod: XU

## 2024-02-24 PROCEDURE — G0378 HOSPITAL OBSERVATION PER HR: HCPCS

## 2024-02-24 PROCEDURE — 99232 SBSQ HOSP IP/OBS MODERATE 35: CPT | Performed by: INTERNAL MEDICINE

## 2024-02-24 PROCEDURE — 94640 AIRWAY INHALATION TREATMENT: CPT

## 2024-02-24 PROCEDURE — A9270 NON-COVERED ITEM OR SERVICE: HCPCS | Performed by: HOSPITALIST

## 2024-02-24 PROCEDURE — A9270 NON-COVERED ITEM OR SERVICE: HCPCS | Performed by: INTERNAL MEDICINE

## 2024-02-24 PROCEDURE — 700102 HCHG RX REV CODE 250 W/ 637 OVERRIDE(OP): Performed by: INTERNAL MEDICINE

## 2024-02-24 PROCEDURE — 85027 COMPLETE CBC AUTOMATED: CPT

## 2024-02-24 PROCEDURE — 94760 N-INVAS EAR/PLS OXIMETRY 1: CPT

## 2024-02-24 PROCEDURE — 700101 HCHG RX REV CODE 250: Performed by: INTERNAL MEDICINE

## 2024-02-24 PROCEDURE — 700111 HCHG RX REV CODE 636 W/ 250 OVERRIDE (IP): Mod: JZ | Performed by: HOSPITALIST

## 2024-02-24 PROCEDURE — 80048 BASIC METABOLIC PNL TOTAL CA: CPT

## 2024-02-24 RX ORDER — GUAIFENESIN 600 MG/1
600 TABLET, EXTENDED RELEASE ORAL EVERY 12 HOURS
Status: DISCONTINUED | OUTPATIENT
Start: 2024-02-24 | End: 2024-02-26 | Stop reason: HOSPADM

## 2024-02-24 RX ADMIN — OXYCODONE HYDROCHLORIDE 5 MG: 5 TABLET ORAL at 12:21

## 2024-02-24 RX ADMIN — LISINOPRIL 10 MG: 5 TABLET ORAL at 04:46

## 2024-02-24 RX ADMIN — NICOTINE 21 MG: 21 PATCH, EXTENDED RELEASE TRANSDERMAL at 04:46

## 2024-02-24 RX ADMIN — OXYCODONE HYDROCHLORIDE 5 MG: 5 TABLET ORAL at 02:20

## 2024-02-24 RX ADMIN — OXYCODONE HYDROCHLORIDE 5 MG: 5 TABLET ORAL at 18:54

## 2024-02-24 RX ADMIN — OXYCODONE HYDROCHLORIDE 5 MG: 5 TABLET ORAL at 06:15

## 2024-02-24 RX ADMIN — AMLODIPINE BESYLATE 5 MG: 5 TABLET ORAL at 04:46

## 2024-02-24 RX ADMIN — LISINOPRIL 10 MG: 5 TABLET ORAL at 17:18

## 2024-02-24 RX ADMIN — LIDOCAINE 1 PATCH: 50 PATCH TOPICAL at 12:22

## 2024-02-24 RX ADMIN — OXYCODONE HYDROCHLORIDE 5 MG: 5 TABLET ORAL at 15:24

## 2024-02-24 RX ADMIN — ASPIRIN 81 MG 324 MG: 81 TABLET ORAL at 04:46

## 2024-02-24 RX ADMIN — ALPRAZOLAM 0.25 MG: 0.25 TABLET ORAL at 21:11

## 2024-02-24 RX ADMIN — ALPRAZOLAM 0.25 MG: 0.25 TABLET ORAL at 15:24

## 2024-02-24 RX ADMIN — FLUTICASONE FUROATE, UMECLIDINIUM BROMIDE AND VILANTEROL TRIFENATATE 1 PUFF: 200; 62.5; 25 POWDER RESPIRATORY (INHALATION) at 04:47

## 2024-02-24 RX ADMIN — GUAIFENESIN 600 MG: 600 TABLET, EXTENDED RELEASE ORAL at 17:18

## 2024-02-24 RX ADMIN — ENOXAPARIN SODIUM 40 MG: 100 INJECTION SUBCUTANEOUS at 17:18

## 2024-02-24 RX ADMIN — OXYCODONE HYDROCHLORIDE 5 MG: 5 TABLET ORAL at 09:08

## 2024-02-24 RX ADMIN — ALPRAZOLAM 0.25 MG: 0.25 TABLET ORAL at 09:08

## 2024-02-24 ASSESSMENT — ENCOUNTER SYMPTOMS
HEADACHES: 0
ABDOMINAL PAIN: 0
NERVOUS/ANXIOUS: 1
SPEECH CHANGE: 0
FEVER: 0
WEAKNESS: 0
SENSORY CHANGE: 0
BLURRED VISION: 0
CHILLS: 0
SHORTNESS OF BREATH: 1
CONSTIPATION: 0
PHOTOPHOBIA: 0
CLAUDICATION: 0
HEARTBURN: 0
COUGH: 0
DIZZINESS: 0
MYALGIAS: 0
DIARRHEA: 0
VOMITING: 0
INSOMNIA: 0
DEPRESSION: 0

## 2024-02-24 ASSESSMENT — PAIN DESCRIPTION - PAIN TYPE
TYPE: ACUTE PAIN
TYPE: ACUTE PAIN

## 2024-02-24 ASSESSMENT — FIBROSIS 4 INDEX: FIB4 SCORE: 1.07

## 2024-02-24 NOTE — PROGRESS NOTES
Telemetry Shift Summary     Rhythm: SR  HR Range:77-99  Ectopy: rPAC  Measurements: 0.16/0.06/0.36    Normal Values  Measurements: 0.12- 0.20 / 0.08-0.10 / 0.30-0.52

## 2024-02-24 NOTE — PROGRESS NOTES
Telemetry Shift Summary     Rhythm SR  HR Range 67-77  Ectopy [facility]  Measurements .16/.08/.38   Per strip printed 0400     Normal Values  Rhythm SR  HR Range    Measurements 0.12-0.20 / 0.06-0.10  / 0.30-0.52

## 2024-02-24 NOTE — CARE PLAN
Problem: Pain - Standard  Goal: Alleviation of pain or a reduction in pain to the patient’s comfort goal  Outcome: Progressing     Problem: Fall Risk  Goal: Patient will remain free from falls  Outcome: Progressing  Note: Fall precaution measures in place.   The patient is Stable - Low risk of patient condition declining or worsening    Shift Goals  Clinical Goals: pain control,safety  Patient Goals: rest,pain control    Progress made toward(s) clinical / shift goals:  Educated on fall prevention measures,encouraged use of call light,updated on plan of care.    Patient is not progressing towards the following goals:

## 2024-02-24 NOTE — PROGRESS NOTES
[facility] Medicine Daily Progress Note    Date of Service  2/24/2024    Chief Complaint  Antonieta Mcdonald is a 68 y.o. female admitted 2/22/2024 with chest pain    Hospital Course  Is a 68-year-old female who came in with complaints of left-sided chest pain she had a fall on Super Bowl Sunday and hit on the left side since then she has been having increasing chest pain but also in the substance sternal area as well.  D-dimer was elevated and CT of the chest showed 5 and 6 left rib fractures.  VQ scan was done secondary to anaphylactic reaction to contrast and showed no evidence of pulmonary embolus.  Unfortunately stress test needs to be deferred by 48 hours after VQ scan for accuracy of the test.  Patient continues to have significant pain and given her risk factors for cardiac etiology of her substernal chest pain she will be monitored until stress test can be completed.    Interval Problem Update  2/23 patient still with significant chest pain especially with deep inspiration.  Reproducible with palpation on the left but she also has substernal chest pain which she does not attribute to her fall.  PE negative on VQ scan, echocardiogram done with preserved ejection fraction.  48 hours needed between VQ scan and stress test therefore this will be done on Sunday.  If patient's pain significantly improves then she has the option of doing an outpatient stress test and this will be again addressed tomorrow.  2/24 patient feeling about the same.  She does feel increased short of breath and was started on oxygen.  She is 24 hours from VQ scan and will have stress test tomorrow.  Will recheck troponin to make sure it is not increasing and I do not expect it to.    I have discussed this patient's plan of care and discharge plan at IDT rounds today with Case Management, Nursing, Nursing leadership, and other members of the IDT team.    Consultants/Specialty  none    Code Status  Full Code    Disposition  The patient  is not medically cleared for discharge to home or a post-acute facility.  Anticipate discharge to: home with close outpatient follow-up    I have placed the appropriate orders for post-discharge needs.    Review of Systems  Review of Systems   Constitutional:  Negative for chills and fever.   HENT:  Negative for congestion.    Eyes:  Negative for blurred vision and photophobia.   Respiratory:  Positive for shortness of breath. Negative for cough.    Cardiovascular:  Positive for chest pain (Left chest and substernal). Negative for claudication and leg swelling.   Gastrointestinal:  Negative for abdominal pain, constipation, diarrhea, heartburn and vomiting.   Genitourinary:  Negative for dysuria and hematuria.   Musculoskeletal:  Negative for joint pain and myalgias.   Skin:  Negative for itching and rash.   Neurological:  Negative for dizziness, sensory change, speech change, weakness and headaches.   Psychiatric/Behavioral:  Negative for depression. The patient is nervous/anxious. The patient does not have insomnia.         Physical Exam  Temp:  [36.1 °C (96.9 °F)-36.9 °C (98.5 °F)] 36.4 °C (97.5 °F)  Pulse:  [60-88] 63  Resp:  [18] 18  BP: ()/(56-87) 105/67  SpO2:  [90 %-95 %] 95 %    Physical Exam  Vitals and nursing note reviewed.   Constitutional:       General: She is not in acute distress.     Appearance: Normal appearance. She is not ill-appearing.   HENT:      Head: Normocephalic and atraumatic.      Nose: Nose normal.   Cardiovascular:      Rate and Rhythm: Normal rate and regular rhythm.      Heart sounds: Normal heart sounds. No murmur heard.  Pulmonary:      Effort: Pulmonary effort is normal.      Breath sounds: Rales present. No wheezing.   Chest:      Chest wall: Tenderness present.   Abdominal:      General: Bowel sounds are normal. There is no distension.      Palpations: Abdomen is soft.   Musculoskeletal:         General: No swelling or tenderness.      Cervical back: Neck supple.    Skin:     General: Skin is warm and dry.   Neurological:      General: No focal deficit present.      Mental Status: She is alert and oriented to person, place, and time.   Psychiatric:         Mood and Affect: Mood normal.         Fluids    Intake/Output Summary (Last 24 hours) at 2/24/2024 1337  Last data filed at 2/24/2024 1114  Gross per 24 hour   Intake 670 ml   Output --   Net 670 ml       Laboratory  Recent Labs     02/22/24  1207 02/23/24  0248 02/24/24  0502   WBC 6.2 6.2 5.7   RBC 4.42 4.01* 3.89*   HEMOGLOBIN 14.9 13.5 13.3   HEMATOCRIT 44.7 41.6 40.7   .1* 103.7* 104.6*   MCH 33.7* 33.7* 34.2*   MCHC 33.3 32.5 32.7   RDW 50.5* 52.3* 51.6*   PLATELETCT 352 307 268   MPV 8.8* 9.2 9.1     Recent Labs     02/22/24  1207 02/23/24  0248 02/24/24  0502   SODIUM 141 139 135   POTASSIUM 4.1 3.8 4.5   CHLORIDE 106 105 102   CO2 21 20 23   GLUCOSE 97 119* 111*   BUN 14 17 16   CREATININE 0.54 0.61 0.62   CALCIUM 9.3 8.9 9.0             Recent Labs     02/23/24  0248   TRIGLYCERIDE 679*   HDL 44   LDL see below       Imaging  NM-LUNG VENT/PERF IMAGING   Final Result      Low probability of pulmonary embolus by PIOPED 2 criteria      EC-ECHOCARDIOGRAM COMPLETE W/O CONT   Final Result      CT-CHEST (THORAX) W/O   Final Result         1. Acute left anterior fifth and sixth rib fractures.      2. Several nodules in the right lung, the largest measures 1.1 x 2.1 cm. These are nonspecific but could be metastasis.      3. No sternal fracture identified.      DX-CHEST-PORTABLE (1 VIEW)   Final Result         1. No acute cardiopulmonary abnormalities are identified.      NM-CARDIAC STRESS TEST    (Results Pending)        Assessment/Plan  * Chest pain- (present on admission)  Assessment & Plan  The ASCVD Risk score (Jennifer DK, et al., 2019) failed to calculate for the following reasons:    Cannot find a previous HDL lab    Cannot find a previous total cholesterol lab  Reports falling on Super Bowl Sunday  Patient has  contrast allergy was not able to undergo a CT with contrast  CT of the chest confirms fractures on the left ribs 5 and 6  VQ scan shows no evidence of pulmonary embolus  Stress test 48 hours from VQ scan   Echocardiogram shows preserved ejection fraction of 60% without significant valvular abnormalities  Morphine for pain management        Alcohol use  Assessment & Plan  Patient with elevated alcohol level on admission  No signs of withdrawal  Patient states she was self-medicating for the pain and has no prior history of alcohol withdrawal  If she starts to show signs of alcohol withdrawal, will activate CIWA protocol    COPD (chronic obstructive pulmonary disease) ([facility])  Assessment & Plan  Patient has been smoking 1 pack of cigarettes per day for over 40 years  COPD is pretty severe  Continue RT protocol  Nebulizer treatments  Oxygen as needed to keep oxygen saturations above 90%  Tobacco counseling cessation given  Currently needing 1L oxygen, productive cough.    Essential hypertension- (present on admission)  Assessment & Plan  Optimize blood pressure management keep systolic blood pressure less than 140 diastolic under 90    Anxiety- (present on admission)  Assessment & Plan  Chronic anxiety  Anxiolytic management will be utilized  As needed Xanax    Tobacco abuse- (present on admission)  Assessment & Plan  -nicotine replacement protocol and cessation education provided   Patient has been smoking for over 40 years  Currently not interested in quitting    Chronic pancreatitis ([facility])- (present on admission)  Assessment & Plan  Evaluate lipase level  Patient apparently in the past was using oxycodone and this caused her to have pancreatitis.  Since then she says she has been pretty well-controlled but occasionally she does flareup  Pain management    Chronic back pain- (present on admission)  Assessment & Plan  Pain management as needed         VTE prophylaxis:    enoxaparin ppx      I have performed a physical  exam and reviewed and updated ROS and Plan today (2/24/2024). In review of yesterday's note (2/23/2024), there are no changes except as documented above.

## 2024-02-25 ENCOUNTER — APPOINTMENT (OUTPATIENT)
Dept: RADIOLOGY | Facility: MEDICAL CENTER | Age: 69
End: 2024-02-25
Attending: INTERNAL MEDICINE
Payer: MEDICARE

## 2024-02-25 LAB
ANION GAP SERPL CALC-SCNC: 14 MMOL/L (ref 7–16)
BUN SERPL-MCNC: 17 MG/DL (ref 8–22)
CALCIUM SERPL-MCNC: 8.9 MG/DL (ref 8.4–10.2)
CHLORIDE SERPL-SCNC: 100 MMOL/L (ref 96–112)
CO2 SERPL-SCNC: 22 MMOL/L (ref 20–33)
CREAT SERPL-MCNC: 0.53 MG/DL (ref 0.5–1.4)
ERYTHROCYTE [DISTWIDTH] IN BLOOD BY AUTOMATED COUNT: 52.6 FL (ref 35.9–50)
GFR SERPLBLD CREATININE-BSD FMLA CKD-EPI: 99 ML/MIN/1.73 M 2
GLUCOSE SERPL-MCNC: 112 MG/DL (ref 65–99)
HCT VFR BLD AUTO: 40.9 % (ref 37–47)
HGB BLD-MCNC: 12.8 G/DL (ref 12–16)
MCH RBC QN AUTO: 33.1 PG (ref 27–33)
MCHC RBC AUTO-ENTMCNC: 31.3 G/DL (ref 32.2–35.5)
MCV RBC AUTO: 105.7 FL (ref 81.4–97.8)
PLATELET # BLD AUTO: 250 K/UL (ref 164–446)
PMV BLD AUTO: 9.4 FL (ref 9–12.9)
POTASSIUM SERPL-SCNC: 4.5 MMOL/L (ref 3.6–5.5)
RBC # BLD AUTO: 3.87 M/UL (ref 4.2–5.4)
SODIUM SERPL-SCNC: 136 MMOL/L (ref 135–145)
TROPONIN T SERPL-MCNC: 7 NG/L (ref 6–19)
WBC # BLD AUTO: 5.5 K/UL (ref 4.8–10.8)

## 2024-02-25 PROCEDURE — 700111 HCHG RX REV CODE 636 W/ 250 OVERRIDE (IP): Performed by: HOSPITALIST

## 2024-02-25 PROCEDURE — 700102 HCHG RX REV CODE 250 W/ 637 OVERRIDE(OP): Performed by: HOSPITALIST

## 2024-02-25 PROCEDURE — 94640 AIRWAY INHALATION TREATMENT: CPT

## 2024-02-25 PROCEDURE — 85027 COMPLETE CBC AUTOMATED: CPT

## 2024-02-25 PROCEDURE — 36415 COLL VENOUS BLD VENIPUNCTURE: CPT

## 2024-02-25 PROCEDURE — 80048 BASIC METABOLIC PNL TOTAL CA: CPT

## 2024-02-25 PROCEDURE — A9502 TC99M TETROFOSMIN: HCPCS

## 2024-02-25 PROCEDURE — A9270 NON-COVERED ITEM OR SERVICE: HCPCS | Performed by: HOSPITALIST

## 2024-02-25 PROCEDURE — G0378 HOSPITAL OBSERVATION PER HR: HCPCS

## 2024-02-25 PROCEDURE — 99232 SBSQ HOSP IP/OBS MODERATE 35: CPT | Performed by: INTERNAL MEDICINE

## 2024-02-25 PROCEDURE — 84484 ASSAY OF TROPONIN QUANT: CPT

## 2024-02-25 PROCEDURE — 700101 HCHG RX REV CODE 250: Performed by: INTERNAL MEDICINE

## 2024-02-25 PROCEDURE — 700111 HCHG RX REV CODE 636 W/ 250 OVERRIDE (IP): Performed by: INTERNAL MEDICINE

## 2024-02-25 PROCEDURE — A9270 NON-COVERED ITEM OR SERVICE: HCPCS | Performed by: INTERNAL MEDICINE

## 2024-02-25 PROCEDURE — 94760 N-INVAS EAR/PLS OXIMETRY 1: CPT

## 2024-02-25 PROCEDURE — 93018 CV STRESS TEST I&R ONLY: CPT | Performed by: INTERNAL MEDICINE

## 2024-02-25 PROCEDURE — 78452 HT MUSCLE IMAGE SPECT MULT: CPT | Mod: 26 | Performed by: INTERNAL MEDICINE

## 2024-02-25 PROCEDURE — 96375 TX/PRO/DX INJ NEW DRUG ADDON: CPT | Mod: XU

## 2024-02-25 PROCEDURE — 96372 THER/PROPH/DIAG INJ SC/IM: CPT | Mod: XU

## 2024-02-25 PROCEDURE — 700102 HCHG RX REV CODE 250 W/ 637 OVERRIDE(OP): Performed by: INTERNAL MEDICINE

## 2024-02-25 RX ORDER — IPRATROPIUM BROMIDE AND ALBUTEROL SULFATE 2.5; .5 MG/3ML; MG/3ML
3 SOLUTION RESPIRATORY (INHALATION)
Status: DISCONTINUED | OUTPATIENT
Start: 2024-02-25 | End: 2024-02-26 | Stop reason: HOSPADM

## 2024-02-25 RX ORDER — ALBUTEROL SULFATE 90 UG/1
2 AEROSOL, METERED RESPIRATORY (INHALATION) EVERY 4 HOURS PRN
Status: DISCONTINUED | OUTPATIENT
Start: 2024-02-25 | End: 2024-02-26 | Stop reason: HOSPADM

## 2024-02-25 RX ORDER — LORAZEPAM 2 MG/ML
0.5 INJECTION INTRAMUSCULAR ONCE
Status: COMPLETED | OUTPATIENT
Start: 2024-02-25 | End: 2024-02-25

## 2024-02-25 RX ADMIN — ASPIRIN 81 MG 324 MG: 81 TABLET ORAL at 04:37

## 2024-02-25 RX ADMIN — ALPRAZOLAM 0.25 MG: 0.25 TABLET ORAL at 07:45

## 2024-02-25 RX ADMIN — OXYCODONE HYDROCHLORIDE 5 MG: 5 TABLET ORAL at 12:58

## 2024-02-25 RX ADMIN — OXYCODONE HYDROCHLORIDE 5 MG: 5 TABLET ORAL at 03:27

## 2024-02-25 RX ADMIN — LORAZEPAM 0.5 MG: 2 INJECTION INTRAMUSCULAR; INTRAVENOUS at 15:16

## 2024-02-25 RX ADMIN — LISINOPRIL 10 MG: 5 TABLET ORAL at 18:01

## 2024-02-25 RX ADMIN — IPRATROPIUM BROMIDE AND ALBUTEROL SULFATE 3 ML: .5; 3 SOLUTION RESPIRATORY (INHALATION) at 16:47

## 2024-02-25 RX ADMIN — OXYCODONE HYDROCHLORIDE 5 MG: 5 TABLET ORAL at 23:16

## 2024-02-25 RX ADMIN — OXYCODONE HYDROCHLORIDE 5 MG: 5 TABLET ORAL at 09:34

## 2024-02-25 RX ADMIN — OXYCODONE HYDROCHLORIDE 5 MG: 5 TABLET ORAL at 16:47

## 2024-02-25 RX ADMIN — NICOTINE 21 MG: 21 PATCH, EXTENDED RELEASE TRANSDERMAL at 04:36

## 2024-02-25 RX ADMIN — OXYCODONE HYDROCHLORIDE 5 MG: 5 TABLET ORAL at 06:31

## 2024-02-25 RX ADMIN — LIDOCAINE 1 PATCH: 50 PATCH TOPICAL at 12:59

## 2024-02-25 RX ADMIN — GUAIFENESIN 600 MG: 600 TABLET, EXTENDED RELEASE ORAL at 18:01

## 2024-02-25 RX ADMIN — OXYCODONE HYDROCHLORIDE 5 MG: 5 TABLET ORAL at 00:25

## 2024-02-25 RX ADMIN — GUAIFENESIN 600 MG: 600 TABLET, EXTENDED RELEASE ORAL at 04:37

## 2024-02-25 RX ADMIN — ENOXAPARIN SODIUM 40 MG: 100 INJECTION SUBCUTANEOUS at 18:01

## 2024-02-25 RX ADMIN — OXYCODONE HYDROCHLORIDE 5 MG: 5 TABLET ORAL at 19:52

## 2024-02-25 RX ADMIN — REGADENOSON 0.4 MG: 0.08 INJECTION, SOLUTION INTRAVENOUS at 15:28

## 2024-02-25 RX ADMIN — FLUTICASONE FUROATE, UMECLIDINIUM BROMIDE AND VILANTEROL TRIFENATATE 1 PUFF: 200; 62.5; 25 POWDER RESPIRATORY (INHALATION) at 04:37

## 2024-02-25 RX ADMIN — ALPRAZOLAM 0.25 MG: 0.25 TABLET ORAL at 19:37

## 2024-02-25 ASSESSMENT — FIBROSIS 4 INDEX: FIB4 SCORE: 1.319932658214888712

## 2024-02-25 ASSESSMENT — ENCOUNTER SYMPTOMS
CHILLS: 0
COUGH: 0
WEAKNESS: 0
WHEEZING: 1
DIZZINESS: 0
DEPRESSION: 0
SPEECH CHANGE: 0
SHORTNESS OF BREATH: 1
SENSORY CHANGE: 0
HEARTBURN: 0
CONSTIPATION: 0
FEVER: 0
BLURRED VISION: 0
MYALGIAS: 0
PHOTOPHOBIA: 0
HEADACHES: 0
DIARRHEA: 0
INSOMNIA: 0
NERVOUS/ANXIOUS: 1
CLAUDICATION: 0
VOMITING: 0
ABDOMINAL PAIN: 0

## 2024-02-25 ASSESSMENT — PAIN DESCRIPTION - PAIN TYPE
TYPE: ACUTE PAIN

## 2024-02-25 ASSESSMENT — PATIENT HEALTH QUESTIONNAIRE - PHQ9
2. FEELING DOWN, DEPRESSED, IRRITABLE, OR HOPELESS: NOT AT ALL
1. LITTLE INTEREST OR PLEASURE IN DOING THINGS: NOT AT ALL
SUM OF ALL RESPONSES TO PHQ9 QUESTIONS 1 AND 2: 0

## 2024-02-25 NOTE — PROGRESS NOTES
Telemetry Shift Summary     Rhythm SR  HR Range 61-65  [facility]  Measurements  .16/.08/.38  Per strip printed 0400     Normal Values  Rhythm SR  HR Range    Measurements 0.12-0.20 / 0.06-0.10  / 0.30-0.52

## 2024-02-25 NOTE — DISCHARGE PLANNING
Case Management Discharge Planning    Admission Date: 2/22/2024  GMLOS:    ALOS: 0    6-Clicks ADL Score: 24  6-Clicks Mobility Score: 23      Anticipated Discharge Dispo: Discharge Disposition: Discharged to home/self care (01)    DME Needed: No    Action(s) Taken:     Spoke to pt at bedside regarding home O2. Pt stated she was on service with [facility] before but she returned O2 equipment back in 2021, however she began receiving bills again about 4 months ago despite not having the equipment anymore. Pt stated she does not want O2 tanks, she would like inogen. RNCM expained that DME companies may not be able to provide inogen POC right away as they need to receive approval form insurance. Pt stated she would like to try to obtain POC from [facility]. If [facility] denies, she would like O2 referral sent to [facility], however, pt refusing O2 tanks as she states she has nowhere to store them at home. RNCM educated pt that tanks are needed to get home but she can speak to O2 company about alternative options.    RNCM called [facility] to inquire about billing status. On call  provided billing phone number [phone]. Billing department closed on weekend.     Choice form completed for [facility] and faxed to DPA.    Per DPA, [facility] declined due to insurance, [facility] not answering. Referral sent to [facility].     [facility] billing phone number left at pt's bedside as pt currently getting stress test.    Spoke to Simona from [facility]. Simona confirmed they are processing order and will attempt to contact pt and/or her  to discuss home O2.    Escalations Completed: DME Company    Medically Clear: No    Next Steps: f/u with DPA regarding O2 acceptance from [facility]    Barriers to Discharge: Medical clearance and Oxygen Delivery

## 2024-02-25 NOTE — PROGRESS NOTES
[facility] Medicine Daily Progress Note    Date of Service  2/25/2024    Chief Complaint  Antonieta Mcdonald is a 68 y.o. female admitted 2/22/2024 with chest pain    Hospital Course  Is a 68-year-old female who came in with complaints of left-sided chest pain she had a fall on [place] Bowl Sunday and hit on the left side since then she has been having increasing chest pain but also in the substance sternal area as well.  D-dimer was elevated and CT of the chest showed 5 and 6 left rib fractures.  VQ scan was done secondary to anaphylactic reaction to contrast and showed no evidence of pulmonary embolus.  Unfortunately stress test needs to be deferred by 48 hours after VQ scan for accuracy of the test.  Patient continues to have significant pain and given her risk factors for cardiac etiology of her substernal chest pain she will be monitored until stress test can be completed.    Interval Problem Update  2/23 patient still with significant chest pain especially with deep inspiration.  Reproducible with palpation on the left but she also has substernal chest pain which she does not attribute to her fall.  PE negative on VQ scan, echocardiogram done with preserved ejection fraction.  48 hours needed between VQ scan and stress test therefore this will be done on Sunday.  If patient's pain significantly improves then she has the option of doing an outpatient stress test and this will be again addressed tomorrow.  2/24 patient feeling about the same.  She does feel increased short of breath and was started on oxygen.  She is 24 hours from VQ scan and will have stress test tomorrow.  Will recheck troponin to make sure it is not increasing and I do not expect it to.  2/25 patient pending stress test this morning.  She is having some wheezing today we will start albuterol for outpatient and order DuoNebs inpatient.  She will require home oxygen upon discharge and I have ordered this and case management is working towards  this.  If stress test is within normal limits and oxygen will be delivered today patient can discharge home.    I have discussed this patient's plan of care and discharge plan at IDT rounds today with Case Management, Nursing, Nursing leadership, and other members of the IDT team.    Consultants/Specialty  none    Code Status  Full Code    Disposition  The patient is not medically cleared for discharge to home or a post-acute facility.  Anticipate discharge to: home with close outpatient follow-up    I have placed the appropriate orders for post-discharge needs.    Review of Systems  Review of Systems   Constitutional:  Negative for chills and fever.   HENT:  Negative for congestion.    Eyes:  Negative for blurred vision and photophobia.   Respiratory:  Positive for shortness of breath and wheezing. Negative for cough.    Cardiovascular:  Positive for chest pain (Left chest and substernal). Negative for claudication and leg swelling.   Gastrointestinal:  Negative for abdominal pain, constipation, diarrhea, heartburn and vomiting.   Genitourinary:  Negative for dysuria and hematuria.   Musculoskeletal:  Negative for joint pain and myalgias.   Skin:  Negative for itching and rash.   Neurological:  Negative for dizziness, sensory change, speech change, weakness and headaches.   Psychiatric/Behavioral:  Negative for depression. The patient is nervous/anxious. The patient does not have insomnia.         Physical Exam  Temp:  [35.9 °C (96.7 °F)-36.7 °C (98 °F)] 36.6 °C (97.9 °F)  Pulse:  [64-90] 70  Resp:  [18] 18  BP: ()/(53-85) 124/65  SpO2:  [93 %-96 %] 96 %    Physical Exam  Vitals and nursing note reviewed.   Constitutional:       General: She is not in acute distress.     Appearance: Normal appearance. She is not ill-appearing.   HENT:      Head: Normocephalic and atraumatic.      Nose: Nose normal.   Cardiovascular:      Rate and Rhythm: Normal rate and regular rhythm.      Heart sounds: Normal heart sounds.  No murmur heard.  Pulmonary:      Effort: Pulmonary effort is normal.      Breath sounds: Rales present. No wheezing.   Chest:      Chest wall: Tenderness present.   Abdominal:      General: Bowel sounds are normal. There is no distension.      Palpations: Abdomen is soft.   Musculoskeletal:         General: No swelling or tenderness.      Cervical back: Neck supple.   Skin:     General: Skin is warm and dry.   Neurological:      General: No focal deficit present.      Mental Status: She is alert and oriented to person, place, and time.   Psychiatric:         Mood and Affect: Mood normal.         Fluids  No intake or output data in the 24 hours ending 02/25/24 1415      Laboratory  Recent Labs     02/23/24  0248 02/24/24  0502 02/25/24  0312   WBC 6.2 5.7 5.5   RBC 4.01* 3.89* 3.87*   HEMOGLOBIN 13.5 13.3 12.8   HEMATOCRIT 41.6 40.7 40.9   .7* 104.6* 105.7*   MCH 33.7* 34.2* 33.1*   MCHC 32.5 32.7 31.3*   RDW 52.3* 51.6* 52.6*   PLATELETCT 307 268 250   MPV 9.2 9.1 9.4     Recent Labs     02/23/24  0248 02/24/24  0502 02/25/24  0312   SODIUM 139 135 136   POTASSIUM 3.8 4.5 4.5   CHLORIDE 105 102 100   CO2 20 23 22   GLUCOSE 119* 111* 112*   BUN 17 16 17   CREATININE 0.61 0.62 0.53   CALCIUM 8.9 9.0 8.9             Recent Labs     02/23/24  0248   TRIGLYCERIDE 679*   HDL 44   LDL see below       Imaging  NM-LUNG VENT/PERF IMAGING   Final Result      Low probability of pulmonary embolus by PIOPED 2 criteria      EC-ECHOCARDIOGRAM COMPLETE W/O CONT   Final Result      CT-CHEST (THORAX) W/O   Final Result         1. Acute left anterior fifth and sixth rib fractures.      2. Several nodules in the right lung, the largest measures 1.1 x 2.1 cm. These are nonspecific but could be metastasis.      3. No sternal fracture identified.      DX-CHEST-PORTABLE (1 VIEW)   Final Result         1. No acute cardiopulmonary abnormalities are identified.      NM-CARDIAC STRESS TEST    (Results Pending)        Assessment/Plan  *  Chest pain- (present on admission)  Assessment & Plan  The ASCVD Risk score (Jennifer WEST, et al., 2019) failed to calculate for the following reasons:    Cannot find a previous HDL lab    Cannot find a previous total cholesterol lab  Reports falling on Super Bowl Sunday  Patient has contrast allergy was not able to undergo a CT with contrast  CT of the chest confirms fractures on the left ribs 5 and 6  VQ scan shows no evidence of pulmonary embolus  Stress test pending today  Echocardiogram shows preserved ejection fraction of 60% without significant valvular abnormalities  Morphine for pain management        Alcohol use  Assessment & Plan  Patient with elevated alcohol level on admission  No signs of withdrawal  Patient states she was self-medicating for the pain and has no prior history of alcohol withdrawal  If she starts to show signs of alcohol withdrawal, will activate CIWA protocol    COPD (chronic obstructive pulmonary disease) ([facility])  Assessment & Plan  Patient has been smoking 1 pack of cigarettes per day for over 40 years  COPD is pretty severe  Continue RT protocol  Nebulizer treatments  Oxygen as needed to keep oxygen saturations above 90%  Tobacco counseling cessation given  Currently needing 2L oxygen, productive cough.  Home oxygen ordered.    Essential hypertension- (present on admission)  Assessment & Plan  Optimize blood pressure management keep systolic blood pressure less than 140 diastolic under 90    Anxiety- (present on admission)  Assessment & Plan  Chronic anxiety  Anxiolytic management will be utilized  As needed Xanax    Tobacco abuse- (present on admission)  Assessment & Plan  -nicotine replacement protocol and cessation education provided   Patient has been smoking for over 40 years  Currently not interested in quitting    Chronic pancreatitis ([facility])- (present on admission)  Assessment & Plan  Evaluate lipase level  Patient apparently in the past was using oxycodone and this caused her to  have pancreatitis.  Since then she says she has been pretty well-controlled but occasionally she does flareup  Pain management    Chronic back pain- (present on admission)  Assessment & Plan  Pain management as needed         VTE prophylaxis: VTE Selection    I have performed a physical exam and reviewed and updated ROS and Plan today (2/25/2024). In review of yesterday's note (2/24/2024), there are no changes except as documented above.

## 2024-02-25 NOTE — DISCHARGE PLANNING
Received choice form @: 1437  Agency/Facility name: [facility]  Sent referral per choice form @: 9157  Spoke with Silver he is aware of patient.    1409  [facility] as declined patient due to insurance.  Referral sent to [facility] as 2nd choice. DANE Peña advised,    1444  Unable to reach anyone at [facility].  Referral sent to [facility]. DANE Peña advised,.

## 2024-02-25 NOTE — PROCEDURES
Pt down for stress test, complaining of 7/10 chest pain. Ordering MD Simona Kaur notified regarding protocol to notify MD of chest pain 4 or higher. Dr Kaur speaks with patient and orders IV Ativan for patient to proceed with testing. Protocol overrode at physicians discretion.

## 2024-02-25 NOTE — CARE PLAN
Problem: Pain - Standard  Goal: Alleviation of pain or a reduction in pain to the patient’s comfort goal  Outcome: Progressing     Problem: Fall Risk  Goal: Patient will remain free from falls  Outcome: Progressing     Problem: Respiratory  Goal: Patient will achieve/maintain optimum respiratory ventilation and gas exchange  Outcome: Progressing   The patient is Stable - Low risk of patient condition declining or worsening    Shift Goals  Clinical Goals: pain control, stress test  Patient Goals: rest,pain control,go home    Progress made toward(s) clinical / shift goals:  walking o2 completed this morning, pending approval from an oxygen company. Pt encouraged to take deep breaths, pt states it is difficult due to her rib cage pain. Pending stress test this afternoon. Pt still endorses some left sided chest pain    Patient is not progressing towards the following goals:

## 2024-02-25 NOTE — FACE TO FACE
"Face to Face Note  -  Durable Medical Equipment    Simona Kuar D.O. - NPI: [id number]  I certify that this patient is under my care and that they had a durable medical equipment(DME)face to face encounter by myself that meets the physician DME face-to-face encounter requirements with this patient on:    Date of encounter:   Patient:                    MRN:                       YOB: 2026  Antonieta Qureshi-Carlos  [id number]  1955     The encounter with the patient was in whole, or in part, for the following medical condition, which is the primary reason for durable medical equipment:  COPD    I certify that, based on my findings, the following durable medical equipment is medically necessary:    Oxygen   HOME O2 Saturation Measurements:(Values must be present for Home Oxygen orders)  Room air sat at rest: 85  Room air sat with amb: 82  With liters of O2: 2, O2 sat at rest with O2: 94  With Liters of O2: 2, O2 sat with amb with O2 : 90  Is the patient mobile?: Yes  If patient feels more short of breath, they can go up to 6 liters per minute and contact healthcare provider.    Supporting Symptoms: The patient requires supplemental oxygen, as the following interventions have been tried with limited or no improvement: \"Positive expiratory pressure therapies, \"Ambulation with oximetry, and \"Incentive spirometry.    My Clinical findings support the need for the above equipment due to:  Hypoxia  "

## 2024-02-25 NOTE — CARE PLAN
Problem: Pain - Standard  Goal: Alleviation of pain or a reduction in pain to the patient’s comfort goal  Outcome: Progressing     Problem: Fall Risk  Goal: Patient will remain free from falls  Outcome: Progressing  Note: Fall precaution measures in place.   The patient is Stable - Low risk of patient condition declining or worsening    Shift Goals  Clinical Goals: pain control,safety  Patient Goals: rest,pain control,go home    Progress made toward(s) clinical / shift goals:  Educated on fall prevention measures,encouraged use of call light,updated on plan of care.    Patient is not progressing towards the following goals:

## 2024-02-26 VITALS
OXYGEN SATURATION: 93 % | DIASTOLIC BLOOD PRESSURE: 80 MMHG | RESPIRATION RATE: 16 BRPM | WEIGHT: 114.42 LBS | HEIGHT: 59 IN | HEART RATE: 75 BPM | TEMPERATURE: 98.4 F | BODY MASS INDEX: 23.07 KG/M2 | SYSTOLIC BLOOD PRESSURE: 125 MMHG

## 2024-02-26 PROCEDURE — 700102 HCHG RX REV CODE 250 W/ 637 OVERRIDE(OP): Performed by: INTERNAL MEDICINE

## 2024-02-26 PROCEDURE — A9270 NON-COVERED ITEM OR SERVICE: HCPCS | Performed by: INTERNAL MEDICINE

## 2024-02-26 PROCEDURE — A9270 NON-COVERED ITEM OR SERVICE: HCPCS | Performed by: HOSPITALIST

## 2024-02-26 PROCEDURE — 700102 HCHG RX REV CODE 250 W/ 637 OVERRIDE(OP): Performed by: HOSPITALIST

## 2024-02-26 PROCEDURE — 700101 HCHG RX REV CODE 250: Performed by: INTERNAL MEDICINE

## 2024-02-26 PROCEDURE — RXMED WILLOW AMBULATORY MEDICATION CHARGE: Performed by: INTERNAL MEDICINE

## 2024-02-26 PROCEDURE — 94760 N-INVAS EAR/PLS OXIMETRY 1: CPT

## 2024-02-26 PROCEDURE — G0378 HOSPITAL OBSERVATION PER HR: HCPCS

## 2024-02-26 PROCEDURE — 99239 HOSP IP/OBS DSCHRG MGMT >30: CPT | Performed by: INTERNAL MEDICINE

## 2024-02-26 RX ORDER — ALPRAZOLAM 0.25 MG/1
0.25 TABLET ORAL 3 TIMES DAILY PRN
Qty: 24 TABLET | Refills: 0 | Status: SHIPPED | OUTPATIENT
Start: 2024-02-26 | End: 2024-03-15

## 2024-02-26 RX ORDER — NICOTINE 21 MG/24HR
1 PATCH, TRANSDERMAL 24 HOURS TRANSDERMAL EVERY 24 HOURS
Qty: 30 PATCH | Refills: 1 | Status: SHIPPED | OUTPATIENT
Start: 2024-02-26

## 2024-02-26 RX ORDER — OXYCODONE HYDROCHLORIDE 5 MG/1
5 TABLET ORAL EVERY 6 HOURS PRN
Qty: 28 TABLET | Refills: 0 | Status: SHIPPED | OUTPATIENT
Start: 2024-02-26 | End: 2024-03-14

## 2024-02-26 RX ORDER — GUAIFENESIN 600 MG/1
600 TABLET, EXTENDED RELEASE ORAL EVERY 12 HOURS
COMMUNITY
Start: 2024-02-26

## 2024-02-26 RX ADMIN — FLUTICASONE FUROATE, UMECLIDINIUM BROMIDE AND VILANTEROL TRIFENATATE 1 PUFF: 200; 62.5; 25 POWDER RESPIRATORY (INHALATION) at 04:54

## 2024-02-26 RX ADMIN — OXYCODONE HYDROCHLORIDE 5 MG: 5 TABLET ORAL at 09:31

## 2024-02-26 RX ADMIN — OXYCODONE HYDROCHLORIDE 5 MG: 5 TABLET ORAL at 06:27

## 2024-02-26 RX ADMIN — OXYCODONE HYDROCHLORIDE 5 MG: 5 TABLET ORAL at 02:23

## 2024-02-26 RX ADMIN — LIDOCAINE 1 PATCH: 50 PATCH TOPICAL at 11:10

## 2024-02-26 RX ADMIN — GUAIFENESIN 600 MG: 600 TABLET, EXTENDED RELEASE ORAL at 04:50

## 2024-02-26 RX ADMIN — AMLODIPINE BESYLATE 5 MG: 5 TABLET ORAL at 04:54

## 2024-02-26 RX ADMIN — LISINOPRIL 10 MG: 5 TABLET ORAL at 04:51

## 2024-02-26 RX ADMIN — ALPRAZOLAM 0.25 MG: 0.25 TABLET ORAL at 03:36

## 2024-02-26 RX ADMIN — NICOTINE 21 MG: 21 PATCH, EXTENDED RELEASE TRANSDERMAL at 04:50

## 2024-02-26 RX ADMIN — ASPIRIN 81 MG 324 MG: 81 TABLET ORAL at 04:50

## 2024-02-26 ASSESSMENT — FIBROSIS 4 INDEX: FIB4 SCORE: 1.319932658214888712

## 2024-02-26 NOTE — CARE PLAN
Problem: Pain - Standard  Goal: Alleviation of pain or a reduction in pain to the patient’s comfort goal  Outcome: Progressing     Problem: Fall Risk  Goal: Patient will remain free from falls  Outcome: Progressing  Note: Fall precaution measures in place   The patient is Stable - Low risk of patient condition declining or worsening    Shift Goals  Clinical Goals: pain control,anxiety control  Patient Goals: rest,go home tomorrow    Progress made toward(s) clinical / shift goals:  Educated on fall prevention measures,encouraged use of call light,updated on plan of care,encouraged verbalization of concerns.    Patient is not progressing towards the following goals:

## 2024-02-26 NOTE — DISCHARGE SUMMARY
Discharge Summary    CHIEF COMPLAINT ON ADMISSION  Chief Complaint   Patient presents with    Chest Pain       Reason for Admission  EMS     Admission Date  2/22/2024    CODE STATUS  Full Code    HPI & HOSPITAL COURSE  Is a 68-year-old female who came in with complaints of left-sided chest pain she had a fall on [place] Sunday and hit on the left side since then she has been having increasing chest pain but also in the substance sternal area as well.  D-dimer was elevated and CT of the chest showed 5 and 6 left rib fractures.  VQ scan was done secondary to anaphylactic reaction to contrast and showed no evidence of pulmonary embolus.  Unfortunately stress test needs to be deferred by 48 hours after VQ scan for accuracy of the test.  She underwent stress test without significant reversible ischemia.  She was started on oxygen and is requiring 2 L nasal cannula to maintain saturation secondary to her underlying COPD.  She is tolerating Roxicodone well for the pain in her fractured ribs.  She is also doing well on the low-dose Xanax for anxiety.  I have sent her home with prescription for both of these.  She is appropriate to discharge home and follow-up with her primary care practitioner as well as pulmonology.    Therefore, she is discharged in good and stable condition to home with close outpatient follow-up.    The patient met 2-midnight criteria for an inpatient stay at the time of discharge.    Discharge Date  2/26/2024    FOLLOW UP ITEMS POST DISCHARGE  PCP and pulmonology    DISCHARGE DIAGNOSES  Principal Problem:    Chest pain (POA: Yes)  Active Problems:    Chronic back pain (POA: Yes)    Chronic pancreatitis (HCC) (POA: Yes)    Tobacco abuse (POA: Yes)    Anxiety (POA: Yes)    Essential hypertension (POA: Yes)    COPD (chronic obstructive pulmonary disease) (HCC) (POA: Unknown)    Alcohol use (POA: Unknown)  Resolved Problems:    * No resolved hospital problems. *      FOLLOW UP  No future  "appointments.  Primary Care Provider    Schedule an appointment as soon as possible for a visit      Pulmonary Specialist  [phone]  Schedule an appointment as soon as possible for a visit        MEDICATIONS ON DISCHARGE     Medication List        START taking these medications        Instructions   ALPRAZolam 0.25 MG Tabs  Commonly known as: Xanax   Take 1 Tablet by mouth 3 times a day as needed for Anxiety for up to 7 days.  Dose: 0.25 mg     guaiFENesin  MG Tb12  Commonly known as: Mucinex   Take 1 Tablet by mouth every 12 hours.  Dose: 600 mg     nicotine 21 MG/24HR Pt24  Commonly known as: Nicoderm   Place 1 Patch on the skin every 24 hours.  Dose: 1 Patch     oxyCODONE immediate-release 5 MG Tabs  Commonly known as: Roxicodone   Take 1 Tablet by mouth every 6 hours as needed for Severe Pain for up to 7 days.  Dose: 5 mg            CONTINUE taking these medications        Instructions   acetaminophen 500 MG Tabs  Commonly known as: Tylenol   Take 500-1,000 mg by mouth every 6 hours as needed. Indications: Pain  Dose: 500-1,000 mg     amLODIPine 5 MG Tabs  Commonly known as: Norvasc   Take 1 Tab by mouth every day.  Dose: 5 mg     lisinopril 10 MG Tabs  Commonly known as: Prinivil   Take 10 mg by mouth 2 times a day.  Dose: 10 mg     Trelegy Ellipta 200-62.5-25 MCG/ACT inhaler  Generic drug: fluticasone-umeclidinium-vilanterol   Inhale 1 Puff every day.  Dose: 1 Puff              Allergies  Allergies   Allergen Reactions    Iodine Anaphylaxis and Shortness of Breath     'I coded\"     Tape Rash     Peels skin off  Paper tape ok, if it is not on to long.       DIET  Orders Placed This Encounter   Procedures    Diet Order Diet: Regular     Standing Status:   Standing     Number of Occurrences:   1     Order Specific Question:   Diet:     Answer:   Regular [1]       ACTIVITY  As tolerated.  Weight bearing as tolerated    CONSULTATIONS  none    PROCEDURES  none    LABORATORY  Lab Results   Component Value " Date    SODIUM 136 02/25/2024    POTASSIUM 4.5 02/25/2024    CHLORIDE 100 02/25/2024    CO2 22 02/25/2024    GLUCOSE 112 (H) 02/25/2024    BUN 17 02/25/2024    CREATININE 0.53 02/25/2024    CREATININE 0.9 11/23/2008    GLOMRATE 66 05/25/2022        Lab Results   Component Value Date    WBC 5.5 02/25/2024    HEMOGLOBIN 12.8 02/25/2024    HEMATOCRIT 40.9 02/25/2024    PLATELETCT 250 02/25/2024        Total time of the discharge process exceeds 36 minutes.

## 2024-02-26 NOTE — PROGRESS NOTES
Telemetry Shift Summary     Rhythm SR  HR Range 72-95  Ectopy None  Measurements  .16/.08/.38  Per strip printed 0400     Normal Values  Rhythm SR  HR Range    Measurements 0.12-0.20 / 0.06-0.10  / 0.30-0.52

## 2024-02-26 NOTE — FLOWSHEET NOTE
Telemetry Shift Summary     Rhythm: SR  HR Range: 70-90  Ectopy: n/a  Measurements: 0.18/0.08/0.38              Normal Values  Rhythm SR  HR Range    Measurements 0.12-0.20 / 0.06-0.10  / 0.30-0.52

## 2024-02-26 NOTE — PROGRESS NOTES
Charge Nurse Rounding Note    Bedside rounding completed to address quality of care and overall patient experience.    Patient Satisfaction addressed including staff responsiveness. Patient/family are aware of the POC and any questions answered. Thorough safety education completed including use of call light prior to all mobility throughout the entirety of the hospital stay.     Patient/family aware of time of next Hourly Round.    No further questions/concerns currently.     Additional Notes: ordered pt scrambled eggs for breakfast

## 2024-03-07 ENCOUNTER — PHARMACY VISIT (OUTPATIENT)
Dept: PHARMACY | Facility: MEDICAL CENTER | Age: 69
End: 2024-03-07
Payer: COMMERCIAL

## 2025-06-27 NOTE — PROGRESS NOTES
Called Dr. Porter about pain management for the patient. Pt is 6/10 pain and doesn't think it is working.     Dr. Porter wants to continue the 2mg of morphine IV.    Attending and PA/NP shared services statement (NON-critical care):